# Patient Record
Sex: MALE | Race: AMERICAN INDIAN OR ALASKA NATIVE | URBAN - METROPOLITAN AREA
[De-identification: names, ages, dates, MRNs, and addresses within clinical notes are randomized per-mention and may not be internally consistent; named-entity substitution may affect disease eponyms.]

---

## 2017-10-14 ENCOUNTER — OUTPATIENT (OUTPATIENT)
Dept: OUTPATIENT SERVICES | Facility: HOSPITAL | Age: 67
LOS: 1 days | Discharge: HOME | End: 2017-10-14

## 2017-10-14 DIAGNOSIS — M10.9 GOUT, UNSPECIFIED: ICD-10-CM

## 2017-10-14 DIAGNOSIS — R07.9 CHEST PAIN, UNSPECIFIED: ICD-10-CM

## 2017-10-14 DIAGNOSIS — I10 ESSENTIAL (PRIMARY) HYPERTENSION: ICD-10-CM

## 2017-12-09 ENCOUNTER — OUTPATIENT (OUTPATIENT)
Dept: OUTPATIENT SERVICES | Facility: HOSPITAL | Age: 67
LOS: 1 days | Discharge: HOME | End: 2017-12-09

## 2017-12-09 DIAGNOSIS — I10 ESSENTIAL (PRIMARY) HYPERTENSION: ICD-10-CM

## 2017-12-09 DIAGNOSIS — R07.9 CHEST PAIN, UNSPECIFIED: ICD-10-CM

## 2017-12-09 DIAGNOSIS — M10.9 GOUT, UNSPECIFIED: ICD-10-CM

## 2018-04-04 ENCOUNTER — INPATIENT (INPATIENT)
Facility: HOSPITAL | Age: 68
LOS: 1 days | Discharge: HOME | End: 2018-04-06
Attending: INTERNAL MEDICINE | Admitting: INTERNAL MEDICINE

## 2018-04-04 VITALS
TEMPERATURE: 96 F | SYSTOLIC BLOOD PRESSURE: 157 MMHG | HEART RATE: 85 BPM | RESPIRATION RATE: 20 BRPM | WEIGHT: 195.11 LBS | DIASTOLIC BLOOD PRESSURE: 89 MMHG | OXYGEN SATURATION: 100 %

## 2018-04-04 DIAGNOSIS — Z98.890 OTHER SPECIFIED POSTPROCEDURAL STATES: Chronic | ICD-10-CM

## 2018-04-04 LAB
ALBUMIN SERPL ELPH-MCNC: 4.9 G/DL — SIGNIFICANT CHANGE UP (ref 3.5–5.2)
ALP SERPL-CCNC: 70 U/L — SIGNIFICANT CHANGE UP (ref 30–115)
ALT FLD-CCNC: 29 U/L — SIGNIFICANT CHANGE UP (ref 0–41)
ANION GAP SERPL CALC-SCNC: 14 MMOL/L — SIGNIFICANT CHANGE UP (ref 7–14)
APTT BLD: 32.2 SEC — SIGNIFICANT CHANGE UP (ref 27–39.2)
AST SERPL-CCNC: 20 U/L — SIGNIFICANT CHANGE UP (ref 0–41)
BILIRUB SERPL-MCNC: 0.5 MG/DL — SIGNIFICANT CHANGE UP (ref 0.2–1.2)
BUN SERPL-MCNC: 15 MG/DL — SIGNIFICANT CHANGE UP (ref 10–20)
CALCIUM SERPL-MCNC: 9.7 MG/DL — SIGNIFICANT CHANGE UP (ref 8.5–10.1)
CHLORIDE SERPL-SCNC: 103 MMOL/L — SIGNIFICANT CHANGE UP (ref 98–110)
CHOLEST SERPL-MCNC: 169 MG/DL — SIGNIFICANT CHANGE UP (ref 100–200)
CK MB CFR SERPL CALC: 6.6 NG/ML — HIGH (ref 0.6–6.3)
CK SERPL-CCNC: 312 U/L — HIGH (ref 0–225)
CK SERPL-CCNC: 408 U/L — HIGH (ref 0–225)
CO2 SERPL-SCNC: 24 MMOL/L — SIGNIFICANT CHANGE UP (ref 17–32)
CREAT SERPL-MCNC: 1 MG/DL — SIGNIFICANT CHANGE UP (ref 0.7–1.5)
ESTIMATED AVERAGE GLUCOSE: 128 MG/DL — HIGH (ref 68–114)
GLUCOSE SERPL-MCNC: 89 MG/DL — SIGNIFICANT CHANGE UP (ref 70–99)
HBA1C BLD-MCNC: 6.1 % — HIGH (ref 4–5.6)
HCT VFR BLD CALC: 48.2 % — SIGNIFICANT CHANGE UP (ref 42–52)
HDLC SERPL-MCNC: 43 MG/DL — SIGNIFICANT CHANGE UP (ref 40–125)
HGB BLD-MCNC: 16.5 G/DL — SIGNIFICANT CHANGE UP (ref 14–18)
INR BLD: 1.01 RATIO — SIGNIFICANT CHANGE UP (ref 0.65–1.3)
LACTATE SERPL-SCNC: 1.9 MMOL/L — SIGNIFICANT CHANGE UP (ref 0.5–2.2)
LIPID PNL WITH DIRECT LDL SERPL: 114 MG/DL — SIGNIFICANT CHANGE UP (ref 4–129)
MCHC RBC-ENTMCNC: 31 PG — SIGNIFICANT CHANGE UP (ref 27–31)
MCHC RBC-ENTMCNC: 34.2 G/DL — SIGNIFICANT CHANGE UP (ref 32–37)
MCV RBC AUTO: 90.6 FL — SIGNIFICANT CHANGE UP (ref 80–94)
NRBC # BLD: 0 /100 WBCS — SIGNIFICANT CHANGE UP (ref 0–0)
PLATELET # BLD AUTO: 170 K/UL — SIGNIFICANT CHANGE UP (ref 130–400)
POTASSIUM SERPL-MCNC: 4.1 MMOL/L — SIGNIFICANT CHANGE UP (ref 3.5–5)
POTASSIUM SERPL-SCNC: 4.1 MMOL/L — SIGNIFICANT CHANGE UP (ref 3.5–5)
PROT SERPL-MCNC: 7.6 G/DL — SIGNIFICANT CHANGE UP (ref 6–8)
PROTHROM AB SERPL-ACNC: 10.9 SEC — SIGNIFICANT CHANGE UP (ref 9.95–12.87)
RBC # BLD: 5.32 M/UL — SIGNIFICANT CHANGE UP (ref 4.7–6.1)
RBC # FLD: 12.1 % — SIGNIFICANT CHANGE UP (ref 11.5–14.5)
SODIUM SERPL-SCNC: 141 MMOL/L — SIGNIFICANT CHANGE UP (ref 135–146)
TOTAL CHOLESTEROL/HDL RATIO MEASUREMENT: 3.9 RATIO — LOW (ref 4–5.5)
TRIGL SERPL-MCNC: 125 MG/DL — SIGNIFICANT CHANGE UP (ref 10–149)
TROPONIN T SERPL-MCNC: <0.01 NG/ML — SIGNIFICANT CHANGE UP
TROPONIN T SERPL-MCNC: <0.01 NG/ML — SIGNIFICANT CHANGE UP
WBC # BLD: 6.67 K/UL — SIGNIFICANT CHANGE UP (ref 4.8–10.8)
WBC # FLD AUTO: 6.67 K/UL — SIGNIFICANT CHANGE UP (ref 4.8–10.8)

## 2018-04-04 RX ORDER — SODIUM CHLORIDE 9 MG/ML
1000 INJECTION INTRAMUSCULAR; INTRAVENOUS; SUBCUTANEOUS ONCE
Qty: 0 | Refills: 0 | Status: COMPLETED | OUTPATIENT
Start: 2018-04-04 | End: 2018-04-04

## 2018-04-04 RX ORDER — METOPROLOL TARTRATE 50 MG
25 TABLET ORAL
Qty: 0 | Refills: 0 | Status: DISCONTINUED | OUTPATIENT
Start: 2018-04-04 | End: 2018-04-06

## 2018-04-04 RX ORDER — ATORVASTATIN CALCIUM 80 MG/1
80 TABLET, FILM COATED ORAL AT BEDTIME
Qty: 0 | Refills: 0 | Status: DISCONTINUED | OUTPATIENT
Start: 2018-04-04 | End: 2018-04-06

## 2018-04-04 RX ORDER — ASPIRIN/CALCIUM CARB/MAGNESIUM 324 MG
81 TABLET ORAL DAILY
Qty: 0 | Refills: 0 | Status: DISCONTINUED | OUTPATIENT
Start: 2018-04-05 | End: 2018-04-06

## 2018-04-04 RX ORDER — ASPIRIN/CALCIUM CARB/MAGNESIUM 324 MG
325 TABLET ORAL ONCE
Qty: 0 | Refills: 0 | Status: COMPLETED | OUTPATIENT
Start: 2018-04-04 | End: 2018-04-04

## 2018-04-04 RX ORDER — ENOXAPARIN SODIUM 100 MG/ML
40 INJECTION SUBCUTANEOUS DAILY
Qty: 0 | Refills: 0 | Status: DISCONTINUED | OUTPATIENT
Start: 2018-04-04 | End: 2018-04-06

## 2018-04-04 RX ADMIN — ATORVASTATIN CALCIUM 80 MILLIGRAM(S): 80 TABLET, FILM COATED ORAL at 22:18

## 2018-04-04 RX ADMIN — Medication 325 MILLIGRAM(S): at 07:05

## 2018-04-04 RX ADMIN — SODIUM CHLORIDE 1000 MILLILITER(S): 9 INJECTION INTRAMUSCULAR; INTRAVENOUS; SUBCUTANEOUS at 08:24

## 2018-04-04 RX ADMIN — Medication 25 MILLIGRAM(S): at 18:22

## 2018-04-04 NOTE — ED PROCEDURE NOTE - ATTENDING CONTRIBUTION TO CARE
I was present for the key portions of the procedure and available as supervisor for the entire procedure as documented.

## 2018-04-04 NOTE — CONSULT NOTE ADULT - ASSESSMENT
66 yo male patient with HTN, DL, gout, ex-smoker, presented for recurrent episodes of dizziness , last episode this morning along with chest pain, nausea , and blurry vision.   EKG showed inverted T waves in lower leads, normal coronaries . CT head negative    - continue telemetry monitoring for arrhytmias  - Consider stress test  , TTE  - neurology evaluation 66 yo male patient with HTN, DL, gout, ex-smoker, presented for recurrent episodes of dizziness , last episode this morning along with chest pain, nausea , and blurry vision.   EKG showed NSR.     - continue telemetry monitoring for arrhytmias  - Consider repeat stress test  - neurology evaluation 66 yo male patient with HTN, DL, gout, ex-smoker, presented for recurrent episodes of dizziness , last episode this morning along with chest pain, nausea , and blurry vision.   EKG showed NSR.     - continue telemetry monitoring for arrhytmias  - Consider repeat stress test  - neurology evaluation    Attending: Patient was seen and examined. Discussed with the cardiologyteam.  I agree with the findings and plan as documented by the resident.    Doubt arrhythmia as underlying etiology.  F/u 2D echo and exercise nuclear stress test.  Telemetry x 24 hours.  Neurology work-up in progress.  C/w statin, BB, add ASA 81 mg.

## 2018-04-04 NOTE — H&P ADULT - HISTORY OF PRESENT ILLNESS
68 yo male patient, Known to have HTN, DL, gout, ex-smoker, presented for acute onset of dizziness that started while he was at work at 5am today.   The patient reports while he was sitting, he started to have acute onset of dizziness with sensation of blurriness as if he is gonna faint, associated with shortness of breath. nausea and mild chest pain. The chest pain was 2/10 in intensity and non radiating. It was not pressure like pain as per him. There was no plapitations. No cough. No URI symptoms.     The episode lasted for few seconds but was recurring for multiple times. When attempted to stand up and walk, he felt little unsteady and asked his coworker to take him to the ED. No weakness.    He reports chronic left shoulder pain of few weeks duration but his shoulder pain is not related to the episode that happened today per him.    He also reports that he is been having these episodes of dizziness that are not posiionnal  for the last few mo but this time it was more severe.    He denies any family history of heart disease. He quit smoking 30 years ago. He had a stress test done more than 10 years ago and was normal. He does not have a cardiologist and sees Dr Jenkins only who is his PCP. 68 yo male patient, Known to have HTN, DL, gout, ex-smoker, presented for acute onset of dizziness that started while he was at work at 5am today.   The patient reports while he was sitting, he started to have acute onset of dizziness with sensation of blurriness as if he is gonna faint, associated with shortness of breath. nausea and mild chest pain. The chest pain was 2/10 in intensity and non radiating. It was not pressure like pain as per him. There was no plapitations. No cough. No URI symptoms.     The episode lasted for few seconds but was recurring for multiple times. When attempted to stand up and walk, he felt little unsteady and asked his coworker to take him to the ED. No weakness.  He also reports that he is been having these episodes of dizziness that are not positional, for the last few mo but this time it was more severe.    He reports chronic left shoulder pain of few weeks duration but his shoulder pain is not related to the episode that happened today per him.    He denies any family history of heart disease. He quit smoking 30 years ago. He had a stress test done more than 10 years ago and was normal. He does not have a cardiologist and sees Dr Jenkins only who is his PCP. 66 yo male patient, Known to have HTN, DL, gout, ex-smoker, presented for acute onset of dizziness that started while he was at work at 5am today.   The patient reports while he was sitting, he started to have acute onset of dizziness with sensation of blurriness as if he is gonna faint, associated with shortness of breath. nausea and mild chest pain. The chest pain was 2/10 in intensity and non radiating. It was not pressure like pain as per him. There was no plapitations. No cough. No URI symptoms.     The episode lasted for few seconds but was recurring for multiple times. When attempted to stand up and walk, he felt little unsteady and asked his coworker to take him to the ED. No weakness.  He also reports that he is been having these episodes of dizziness that are not positional, for the last few mo but this time it was more severe.    He reports chronic left shoulder pain of few weeks duration but his shoulder pain is not related to the episode that happened today per him.    He reports that he exercises frequently but never felt any chest pain or SOB.  He denies any family history of heart disease. He quit smoking 30 years ago. He had a stress test done more than 10 years ago and was normal. He does not have a cardiologist and sees Dr Jenkins only who is his PCP. 66 yo male patient, Known to have HTN, DL, gout, ex-smoker, presented for acute onset of dizziness that started while he was at work at 5am today.   The patient reports while he was sitting, he started to have acute onset of dizziness with sensation of blurriness as if he is gonna faint, associated with shortness of breath. nausea and mild chest pain. The chest pain was 2/10 in intensity and non radiating. It was not pressure like pain as per him. There was no plapitations. No cough. No URI symptoms. normal po intake, No vomiting.    The episode lasted for few seconds but was recurring for multiple times. When attempted to stand up and walk, he felt little unsteady and asked his coworker to take him to the ED. No weakness.  He also reports that he is been having these episodes of dizziness that are not positional, for the last few mo but this time it was more severe. Most of the episodes happen in the morning    He reports chronic left shoulder pain of few weeks duration but his shoulder pain is not related to the episode that happened today per him.    He reports that he exercises frequently but never felt any chest pain or SOB.  He denies any family history of heart disease. He quit smoking 30 years ago. He had a stress test done more than 10 years ago and was normal. He does not have a cardiologist and sees Dr Jenkins only who is his PCP.

## 2018-04-04 NOTE — H&P ADULT - ATTENDING COMMENTS
68 yo male patient, Known to have HTN, DL, gout, ex-smoker, presented for acute onset of dizziness, associated with nausea, shortness of breath and mild chest pain, sensation as if he is gonna faint and unsteadiness. It lasted for few seconds but was recurrent and not positional.    1. Dizziness with SOB and chest pain, unsteadiness      ECG TWI in inferolateral leads     cardiac enzymes neg     Etiology?  TIA,   ACS  orthostatics (normal in ED) arrythmia  BPPV   hypoglycemia      CT head non contrast      Cardio/ neuro eval     c/w ASA 81, atorvastatin 80mg, metoprolol       follow up   cardiac enz, serial ECG, 2D echo     check lipid profile and HbA1c     check fasting sugars and AC + HS     DVT PPX, out of bed to chair, 2gSodium diet

## 2018-04-04 NOTE — ED PROVIDER NOTE - OBJECTIVE STATEMENT
66 yo M with PMHx of HTN, hypercholesterolemia and gout presents to the ED c/o chest pain and SOB. Pain is substernal and radiates to left arm. Pt states he has been having intermittent symptoms over the past few days and this morning around 530 symptoms returned and were worse than prior episodes. Pt denies hx of similar symptoms in the past. Pt denies hx of cardiac disease. Pt admits to hx of smoking but quit 10 years ago. Pt denies fever, chills, nausea, vomiting, abdominal pain, diarrhea, headache, dizziness, weakness, back pain, LOC, trauma, cough, calf pain/swelling, recent travel, recent surgery.

## 2018-04-04 NOTE — ED PROVIDER NOTE - ATTENDING CONTRIBUTION TO CARE
66 yo M wit history of HTN, HLD, former smoker here with intermittent L sided CP over the last 2-3 days, much worst at 5am today, described as pressure like with radiation down L arm. Associated with SOB -- VS unremarkable, on exam patient looks pale, not diaphoretic but not well, no murmur, clear lungs, no LE edema. EKG without ST elevations but does have T wave inversions inferolaterally with mild ST depressions laterally.     Given appearance of patient, EKG findings, description of sx, will check labs, give asa and likely admit to tele vs place in obs for further testing.

## 2018-04-04 NOTE — H&P ADULT - NSHPPHYSICALEXAM_GEN_ALL_CORE
T(C): 36.9 (04-04-18 @ 08:27), Max: 36.9 (04-04-18 @ 08:27)  HR: 65 (04-04-18 @ 08:27) (65 - 85)  BP: 121/74 (04-04-18 @ 08:27) (121/74 - 157/89)  RR: 18 (04-04-18 @ 08:27) (18 - 20)  SpO2: 100% (04-04-18 @ 05:57) (100% - 100%)    PHYSICAL EXAM:  GENERAL: NAD, well-developed  HEAD:  Atraumatic, Normocephalic  EYES: EOMI, PERRLA, conjunctiva and sclera clear  ENT: Normal tympanic membrane. No nasal obstruction or discharge. No tonsillar exudate, swelling or erythema.  NECK: Supple, No JVD  CHEST/LUNG: Clear to auscultation bilaterally; No wheeze  HEART: Regular rate and rhythm; No murmurs, rubs, or gallops  ABDOMEN: Soft, Nontender, Nondistended; Bowel sounds present  EXTREMITIES:  2+ Peripheral Pulses, No clubbing, cyanosis, or edema  PSYCH: AAOx3  NEUROLOGY: non-focal  SKIN: No rashes or lesions

## 2018-04-04 NOTE — CONSULT NOTE ADULT - SUBJECTIVE AND OBJECTIVE BOX
66 yo male patient with HTN, DL, gout, ex-smoker, presented for acute onset of dizziness that started while he was at work at 5am today, along with blurry vision, shortness of breath , nausea and chest pain 2/10 in intensity that lasted for few seconds. Patient presented to the ED for recurring symptoms.  He also reported unsteady gait. No palpitations, no persistent chest pain. He reported  chronic left shoulder pain of few weeks duration  not related to the episode     Patient is active at baseline,  he exercises frequently but never felt any chest pain or SOB.  No family history of heart disease. He quit smoking 30 years ago. He had a stress test done more than 10 years ago and was normal.     PAST MEDICAL & SURGICAL HISTORY:  Gout  High cholesterol  Hypertension  History of appendectomy      Home Medications:  metoprolol tartrate 50 mg oral tablet: orally once a day (04 Apr 2018 06:25)  simvastatin 20 mg oral tablet: 1 tab(s) orally once a day (at bedtime) (04 Apr 2018 06:25)    MEDICATIONS  (STANDING):  atorvastatin 80 milliGRAM(s) Oral at bedtime  enoxaparin Injectable 40 milliGRAM(s) SubCutaneous daily  metoprolol tartrate 25 milliGRAM(s) Oral two times a day    T(C): 36.9 (04-04-18 @ 08:27), Max: 36.9 (04-04-18 @ 08:27)  HR: 65 (04-04-18 @ 08:27) (65 - 85)  BP: 121/74 (04-04-18 @ 08:27) (121/74 - 157/89)  RR: 18 (04-04-18 @ 08:27) (18 - 20)  SpO2: 100% (04-04-18 @ 05:57) (100% - 100%)    PHYSICAL EXAM:  GENERAL: NAD, well-developed  HEAD:  Atraumatic, Normocephalic  EYES: EOMI, PERRLA, conjunctiva and sclera clear  ENT: Normal tympanic membrane. No nasal obstruction or discharge. No tonsillar exudate, swelling or erythema.  NECK: Supple, No JVD  CHEST/LUNG: Clear to auscultation bilaterally; No wheeze  HEART: Regular rate and rhythm; No murmurs, rubs, or gallops  ABDOMEN: Soft, Nontender, Nondistended; Bowel sounds present  EXTREMITIES:  2+ Peripheral Pulses, No clubbing, cyanosis, or edema  PSYCH: AAOx3  NEUROLOGY: non-focal  SKIN: No rashes or lesions                          16.5   6.67  )-----------( 170      ( 04 Apr 2018 06:33 )             48.2       04-04    141  |  103  |  15  ----------------------------<  89  4.1   |  24  |  1.0    Ca    9.7      04 Apr 2018 06:33    TPro  7.6  /  Alb  4.9  /  TBili  0.5  /  DBili  x   /  AST  20  /  ALT  29  /  AlkPhos  70  04-04    < from: 12 Lead ECG (04.04.18 @ 06:06) >    Diagnosis Line Normal sinus rhythm  T wave abnormality, consider inferolateral ischemia  Abnormal ECG    Confirmed by Felix Matthew ([id number]) on 4/4/2018 6:22:22 AM    < from: CT Head No Cont (04.04.18 @ 10:59) >  Impression:     1.  No acute mass effect, midline shift or hemorrhage.    2.  If the patient continues to be symptomatic follow-up CT scan and/or   MRI of the brain may be helpful for further evaluation.     < from: Xray Chest 1 View-PORTABLE IMMEDIATE (04.04.18 @ 06:56) >  Impression:      No radiographic evidence of acute cardiopulmonary disease. 66 yo male patient with HTN, DL, gout, ex-smoker, presented for acute onset of dizziness that started while he was at work at 5am today, along with blurry vision, shortness of breath , nausea and chest pain 2/10 in intensity that lasted for few seconds. Patient presented to the ED for recurring symptoms.  He also reported unsteady gait. No palpitations, no persistent chest pain. He reported  chronic left shoulder pain of few weeks duration  not related to the episode . Patient was hospitalized in 2014 for SOB , TTE and stress test were normal.    Patient is active at baseline,  he exercises frequently but never felt any chest pain or SOB.  No family history of heart disease. He quit smoking 30 years ago.     PAST MEDICAL & SURGICAL HISTORY:  Gout  High cholesterol  Hypertension  History of appendectomy      Home Medications:  metoprolol tartrate 50 mg oral tablet: orally once a day (04 Apr 2018 06:25)  simvastatin 20 mg oral tablet: 1 tab(s) orally once a day (at bedtime) (04 Apr 2018 06:25)    MEDICATIONS  (STANDING):  atorvastatin 80 milliGRAM(s) Oral at bedtime  enoxaparin Injectable 40 milliGRAM(s) SubCutaneous daily  metoprolol tartrate 25 milliGRAM(s) Oral two times a day    T(C): 36.9 (04-04-18 @ 08:27), Max: 36.9 (04-04-18 @ 08:27)  HR: 65 (04-04-18 @ 08:27) (65 - 85)  BP: 121/74 (04-04-18 @ 08:27) (121/74 - 157/89)  RR: 18 (04-04-18 @ 08:27) (18 - 20)  SpO2: 100% (04-04-18 @ 05:57) (100% - 100%)    PHYSICAL EXAM:  GENERAL: NAD, well-developed  HEAD:  Atraumatic, Normocephalic  CHEST/LUNG: Clear to auscultation bilaterally; No wheeze  HEART: Regular rate and rhythm; No murmurs, rubs, or gallops  ABDOMEN: Soft, Nontender, Nondistended; Bowel sounds present  EXTREMITIES:  2+ Peripheral Pulses, No clubbing, cyanosis, or edema                        16.5   6.67  )-----------( 170      ( 04 Apr 2018 06:33 )             48.2       04-04    141  |  103  |  15  ----------------------------<  89  4.1   |  24  |  1.0    Ca    9.7      04 Apr 2018 06:33    TPro  7.6  /  Alb  4.9  /  TBili  0.5  /  DBili  x   /  AST  20  /  ALT  29  /  AlkPhos  70  04-04    < from: 12 Lead ECG (04.04.18 @ 06:06) >    Diagnosis Line Normal sinus rhythm  T wave abnormality, consider inferolateral ischemia  Abnormal ECG    Confirmed by Felix Matthew ([id number]) on 4/4/2018 6:22:22 AM    < from: CT Head No Cont (04.04.18 @ 10:59) >  Impression:     1.  No acute mass effect, midline shift or hemorrhage.    2.  If the patient continues to be symptomatic follow-up CT scan and/or   MRI of the brain may be helpful for further evaluation.     < from: Xray Chest 1 View-PORTABLE IMMEDIATE (04.04.18 @ 06:56) >  Impression:      No radiographic evidence of acute cardiopulmonary disease.      TTE 2014: Left ventricle: Systolic function was normal. Ejection fraction was estimated in the range of 55 % to 65 %. There were no regional wall motion abnormalities. Wall thickness was mildly increased. Hypertrophy was noted. Mitral valve: There was mild regurgitation. Tricuspid valve: There was mild regurgitation.     Exercise stress test 2014 : normal

## 2018-04-04 NOTE — CONSULT NOTE ADULT - SUBJECTIVE AND OBJECTIVE BOX
RODRIGUEZ KAUFFMAN     67y     Male    [id number]                                                           CC:Patient is a 67y old  Male who presents with a chief complaint of dizziness (04 Apr 2018 09:28)      HPI:  68 yo male patient, Known to have HTN, DL, gout, ex-smoker, presented for acute onset of dizziness that started while he was at work at 5am today.   The patient reports while he was sitting, he started to have acute onset of dizziness with sensation of blurriness as if he is gonna faint, associated with shortness of breath. nausea and mild chest pain. The chest pain was 2/10 in intensity and non radiating. It was not pressure like pain as per him. There was no plapitations. No cough. No URI symptoms. normal po intake, No vomiting.    The episode lasted for few seconds but was recurring for multiple times. When attempted to stand up and walk, he felt little unsteady and asked his coworker to take him to the ED. No weakness.  He also reports that he is been having these episodes of dizziness that are not positional, for the last few mo but this time it was more severe. Most of the episodes happen in the morning    He reports chronic left shoulder pain of few weeks duration but his shoulder pain is not related to the episode that happened today per him.    He reports that he exercises frequently but never felt any chest pain or SOB.  He denies any family history of heart disease. He quit smoking 30 years ago. He had a stress test done more than 10 years ago and was normal. He does not have a cardiologist and sees Dr Jenkins only who is his PCP. (04 Apr 2018 09:28)    Patient seen and examined.  Patient had dizziness this morning when going from car to work.  Felt sweaty, palpitations and left chest tightness.  Dizziness still occurring when he tries to move.  Orthostatics checked by me at bedside negative    ROS:  Constitutional, Neurological, Psychiatric, Eyes, ENT, Cardiovascular, Respiratory, Gastrointestinal, Genitourinary, Musculoskeletal, Integumentary, Endocrine and Heme/Lymph are otherwise negative. Except for    FAMILY HISTORY:  Family history of breast cancer (Mother)  Family history of pancreatic cancer (Father): father      Vital Signs Last 24 Hrs  T(C): 36.9 (04 Apr 2018 08:27), Max: 36.9 (04 Apr 2018 08:27)  T(F): 98.4 (04 Apr 2018 08:27), Max: 98.4 (04 Apr 2018 08:27)  HR: 65 (04 Apr 2018 08:27) (65 - 85)  BP: 121/74 (04 Apr 2018 08:27) (121/74 - 157/89)  BP(mean): --  RR: 18 (04 Apr 2018 08:27) (18 - 20)  SpO2: 100% (04 Apr 2018 05:57) (100% - 100%)    Physical Exam:  Constitutional: alert and in no acute distress.  Eyes: the sclera and conjunctiva were normal, pupils were equal in size, round, reactive to light, with normal accommodation and extraocular movements were intact.   Back: no costovertebral angle tenderness and no spinal tenderness.      Neuro Exam:  Orientation: oriented to person, oriented to place and oriented to time.   Attention: normal concentrating ability and visual attention was not decreased.   Language: no difficulty naming common objects, no difficulty repeating a phrase, no difficulty writing a sentence, fluency intact, comprehension intact and reading intact.   Fund of knowledge: displays adequate knowledge of personal past history.   Cranial Nerves: visual acuity intact bilaterally, visual fields full to confrontation, pupils equal round and reactive to light, extraocular motion intact, facial sensation intact symmetrically, face symmetrical, hearing was intact bilaterally, tongue and palate midline, head turning and shoulder shrug symmetric and there was no tongue deviation with protrusion.   Motor: muscle tone was normal in all four extremities, muscle strength was normal in all four extremities and normal bulk in all four extremities.   Sensory exam: light touch was intact.   Coordination:. normal gait. balance was intact. there was no past-pointing. no tremor present.   Deep tendon reflexes:   1+ in UE and 0 in LE  Plantar responses normal on the right, normal on the left.      NIHSS: 0    Allergies    No Known Allergies    Intolerances       Home Medications:  metoprolol tartrate 50 mg oral tablet: orally once a day (04 Apr 2018 06:25)  simvastatin 20 mg oral tablet: 1 tab(s) orally once a day (at bedtime) (04 Apr 2018 06:25)      MEDICATIONS  (STANDING):  atorvastatin 80 milliGRAM(s) Oral at bedtime  enoxaparin Injectable 40 milliGRAM(s) SubCutaneous daily  metoprolol tartrate 25 milliGRAM(s) Oral two times a day    MEDICATIONS  (PRN):      LABS:                        16.5   6.67  )-----------( 170      ( 04 Apr 2018 06:33 )             48.2     04-04    141  |  103  |  15  ----------------------------<  89  4.1   |  24  |  1.0    Ca    9.7      04 Apr 2018 06:33    TPro  7.6  /  Alb  4.9  /  TBili  0.5  /  DBili  x   /  AST  20  /  ALT  29  /  AlkPhos  70  04-04    PT/INR - ( 04 Apr 2018 06:33 )   PT: 10.90 sec;   INR: 1.01 ratio         PTT - ( 04 Apr 2018 06:33 )  PTT:32.2 sec        Neuro Imaging:  < from: CT Head No Cont (04.04.18 @ 10:59) >  INTERPRETATION:  Clinical History / Reason for exam: Dizziness rule out   stroke.    Technique: Multiple contiguous axial CT images of the head were obtained   from the base of the skull to the vertex without administration of   intravenous contrast.    Comparison: None available at this time.      Findings: The ventricles, basal cisterns and sulcal pattern are within   normal limits for patient's stated age.  There are no cerebral,   cerebellar or mid brain parenchymal abnormalities.  There is no acute   mass effect, midline shift or hemorrhage.  No extra-axial fluid   collections are identified.    The bones of the calvarium are intact.  The paranasal sinuses, bilateral   mastoid complexes, globes and orbits are grossly within normal limits.    Beam hardening artifact is noted overlying the brain stem and posterior   fossa which is inherent to CT in this location.    Impression:     1.  No acute mass effect, midline shift or hemorrhage.    2.  If the patient continues to be symptomatic follow-up CT scan and/or   MRI of the brain may be helpful for further evaluation.         < end of copied text >      Assessment / Plan: Patient presenting with dizziness and chest tightness.  DDx VBI, Cardiac event  1. CT angio of head and neck - if stenosis of posterior circulation then obtain MRI brain w/o KINGSLEY  2. Cardiac work up  3. Repeat orthostatics  4. Call with questions

## 2018-04-04 NOTE — H&P ADULT - FAMILY HISTORY
Father  Still living? Unknown  Family history of pancreatic cancer, Age at diagnosis: Age Unknown     Mother  Still living? Unknown  Family history of breast cancer, Age at diagnosis: Age Unknown

## 2018-04-04 NOTE — ED PROVIDER NOTE - MEDICAL DECISION MAKING DETAILS
CK and CKMB elevated, 1st trop negative -- will proceed with admission for serial trops, concern for evolving cardiac ischemia

## 2018-04-04 NOTE — H&P ADULT - ASSESSMENT
68 yo male patient, Known to have HTN, DL, gout, ex-smoker, presented for acute onset of dizziness, associated with nausea, shortness of breath and mild chest pain, sensation as if he is gonna faint and unsteadiness. It lasted for few seconds but was recurrent and not positional.    vitals stable. 68 yo male patient, Known to have HTN, DL, gout, ex-smoker, presented for acute onset of dizziness, associated with nausea, shortness of breath and mild chest pain, sensation as if he is gonna faint and unsteadiness. It lasted for few seconds but was recurrent and not positional.    vitals stable. CXR NAPD  ECG: TWI in inferolateral leads. No prior ecg to compare. cardiac enzymes negative  orthostatics taken by me in the ED were negative.    #Dizziness with SOB and chest pain, unsteadiness  ECG changes with neg cardiac enzymes     r/o CVA, r/o ACS   r/o orthostatic hypotension   r/o arrythmia   r/o BPPV  r/o hypoglycemia     -admit to Telemetry  -CT head non contrast, neurology consult.   - recheck orthostatics  - ASA 81, atorvastatin 80mg, metoprolol  -follow up 2nd set of cardiac enz, serial ECG, 2D echo  -cardiology Evaluation  -check lipid profile and HbA1c  -check fasting sugars and AC + HS  -DVT PPX, out of bed to chair, 2gSodium diet 68 yo male patient, Known to have HTN, DL, gout, ex-smoker, presented for acute onset of dizziness, associated with nausea, shortness of breath and mild chest pain, sensation as if he is gonna faint and unsteadiness. It lasted for few seconds but was recurrent and not positional.    vitals stable. CXR NAPD  ECG: TWI in inferolateral leads. No prior ecg to compare. cardiac enzymes negative  orthostatics taken by me in the ED were negative.    #Dizziness with SOB and chest pain, unsteadiness  ECG changes with neg cardiac enzymes     r/o TIA, r/o ACS   r/o orthostatic hypotension   r/o arrythmia   r/o BPPV  r/o hypoglycemia     -admit to Telemetry  -CT head non contrast, neurology consult.   - recheck orthostatics  - ASA 81, atorvastatin 80mg, metoprolol  -follow up 2nd set of cardiac enz, serial ECG, 2D echo  -cardiology Evaluation  -check lipid profile and HbA1c  -check fasting sugars and AC + HS  -DVT PPX, out of bed to chair, 2gSodium diet

## 2018-04-04 NOTE — H&P ADULT - NSHPLABSRESULTS_GEN_ALL_CORE
16.5   6.67  )-----------( 170      ( 04 Apr 2018 06:33 )             48.2       04-04    141  |  103  |  15  ----------------------------<  89  4.1   |  24  |  1.0    Ca    9.7      04 Apr 2018 06:33    TPro  7.6  /  Alb  4.9  /  TBili  0.5  /  DBili  x   /  AST  20  /  ALT  29  /  AlkPhos  70  04-04          PT/INR - ( 04 Apr 2018 06:33 )   PT: 10.90 sec;   INR: 1.01 ratio         PTT - ( 04 Apr 2018 06:33 )  PTT:32.2 sec    Lactate Trend  04-04 @ 06:33 Lactate:1.9       CARDIAC MARKERS ( 04 Apr 2018 06:33 )  x     / <0.01 ng/mL / 408 U/L / x     / 6.6 ng/mL

## 2018-04-04 NOTE — ED PROVIDER NOTE - PHYSICAL EXAMINATION
VITAL SIGNS: I have reviewed nursing notes and confirm.  CONSTITUTIONAL: Well-developed; well-nourished; in no acute distress.  SKIN: Skin exam is warm and dry, no acute rash.  HEAD: Normocephalic; atraumatic.  EYES: PERRL, EOM intact; conjunctiva and sclera clear.  ENT: No nasal discharge; airway clear.   NECK: Supple; non tender.  CARD: S1, S2 normal; no murmurs, gallops, or rubs. Regular rate and rhythm. No chest wall TTP.  RESP: No wheezes, rales or rhonchi. Speaking in full sentences.   ABD: Normal bowel sounds; soft; non-distended; non-tender; No rebound or guarding.   EXT: Normal ROM. No clubbing, cyanosis or edema.  NEURO: Alert, oriented. Grossly unremarkable. No focal deficits.

## 2018-04-04 NOTE — ED PROVIDER NOTE - PROGRESS NOTE DETAILS
Discussed case with Dr. Grayson covering for Dr. Jenkins, accepts admission. Signed out case to Dr. Debbie LAKE.

## 2018-04-04 NOTE — H&P ADULT - NSHPSOCIALHISTORY_GEN_ALL_CORE
stopped smoking 30 years ago, non alcoholic, no illicit drug use  Has a desk Job , works in nutrition

## 2018-04-04 NOTE — H&P ADULT - NSHPREVIEWOFSYSTEMS_GEN_ALL_CORE
REVIEW OF SYSTEMS:    CONSTITUTIONAL: No weakness, fevers or chills  EYES/ENT: No visual changes;  No vertigo or throat pain   NECK: No pain or stiffness  RESPIRATORY: No cough, wheezing, hemoptysis; No shortness of breath  CARDIOVASCULAR: No chest pain or palpitations  GASTROINTESTINAL: No abdominal or epigastric pain. No nausea, vomiting, or hematemesis; No diarrhea or constipation. No melena or hematochezia.  GENITOURINARY: No dysuria, frequency or hematuria  NEUROLOGICAL: No numbness or weakness  SKIN: No itching, rashes REVIEW OF SYSTEMS:    CONSTITUTIONAL: No weakness, fevers or chills  EYES/ENT: No visual changes;  No vertigo or throat pain   NECK: No pain or stiffness  RESPIRATORY: No cough, wheezing, hemoptysis; No shortness of breath  CARDIOVASCULAR: No palpitations  GASTROINTESTINAL: No abdominal or epigastric pain. No  vomiting, or hematemesis; No diarrhea or constipation  GENITOURINARY: No dysuria, frequency or hematuria  NEUROLOGICAL: No numbness or weakness  SKIN: No itching, rashes

## 2018-04-05 LAB
ANION GAP SERPL CALC-SCNC: 7 MMOL/L — SIGNIFICANT CHANGE UP (ref 7–14)
BUN SERPL-MCNC: 16 MG/DL — SIGNIFICANT CHANGE UP (ref 10–20)
CALCIUM SERPL-MCNC: 9.4 MG/DL — SIGNIFICANT CHANGE UP (ref 8.5–10.1)
CHLORIDE SERPL-SCNC: 105 MMOL/L — SIGNIFICANT CHANGE UP (ref 98–110)
CK SERPL-CCNC: 241 U/L — HIGH (ref 0–225)
CO2 SERPL-SCNC: 26 MMOL/L — SIGNIFICANT CHANGE UP (ref 17–32)
CREAT SERPL-MCNC: 1.1 MG/DL — SIGNIFICANT CHANGE UP (ref 0.7–1.5)
GLUCOSE SERPL-MCNC: 104 MG/DL — HIGH (ref 70–99)
POTASSIUM SERPL-MCNC: 4.6 MMOL/L — SIGNIFICANT CHANGE UP (ref 3.5–5)
POTASSIUM SERPL-SCNC: 4.6 MMOL/L — SIGNIFICANT CHANGE UP (ref 3.5–5)
SODIUM SERPL-SCNC: 138 MMOL/L — SIGNIFICANT CHANGE UP (ref 135–146)
TROPONIN T SERPL-MCNC: <0.01 NG/ML — SIGNIFICANT CHANGE UP

## 2018-04-05 RX ADMIN — Medication 25 MILLIGRAM(S): at 05:42

## 2018-04-05 RX ADMIN — ENOXAPARIN SODIUM 40 MILLIGRAM(S): 100 INJECTION SUBCUTANEOUS at 11:57

## 2018-04-05 RX ADMIN — ATORVASTATIN CALCIUM 80 MILLIGRAM(S): 80 TABLET, FILM COATED ORAL at 21:55

## 2018-04-05 RX ADMIN — Medication 81 MILLIGRAM(S): at 11:57

## 2018-04-05 NOTE — PROGRESS NOTE ADULT - ASSESSMENT
66 yo male patient, Known to have HTN, DL, gout, ex-smoker, presented for acute onset of dizziness, associated with nausea, shortness of breath and mild chest pain, sensation as if he is gonna faint and unsteadiness. It lasted for few seconds but was recurrent and not positional.    #Dizziness: r/o TIA vs arrythmia vs hypoglycemia  orthostatics negative  2 sets of ce negative  stress test: pending  CT head: no acute changes  neuro eval appreciated: CT angio of head and neck - if stenosis of posterior circulation will obtain MRI brain w/o KINGSLEY  echo findings:  1. Normal global left ventricular systolic function.   2. LV Ejection Fraction by Mckeon's Method with a biplane EF of 69 %.   3. Spectral Doppler shows impaired relaxation pattern of left   ventricular myocardial filling (Grade I diastolic dysfunction).    C/w statin, BB, add ASA 81 mg.    #DVT PPX, out of bed to chair, 2gSodium diet

## 2018-04-05 NOTE — PROGRESS NOTE ADULT - ATTENDING COMMENTS
66 yo male patient, Known to have HTN, DL, gout, ex-smoker, presented for acute onset of dizziness, associated with nausea, shortness of breath and mild chest pain, sensation as if he is gonna faint and unsteadiness. It lasted for few seconds but was recurrent and not positional.    1. Dizziness with SOB and chest pain, unsteadiness      ECG TWI in inferolateral leads     cardiac enzymes neg     Etiology?  TIA,   ACS  orthostatics (normal in ED) arrythmia  BPPV   hypoglycemia      CT head non contrast      Cardio/ neuro evals appreciated     c/w ASA 81, atorvastatin 80mg, metoprolol       follow up   cardiac enz, serial ECG, 2D echo CTA     check lipid profile and HbA1c     check fasting sugars and AC + HS     DVT PPX, out of bed to chair, 2gSodium diet .

## 2018-04-05 NOTE — PROGRESS NOTE ADULT - SUBJECTIVE AND OBJECTIVE BOX
DALY RODRIGUEZ  67y  Male    Patient is a 67y old  Male who presents with a chief complaint of dizziness (04 Apr 2018 09:28)      INTERVAL HPI/OVERNIGHT EVENTS: none    T(C): 35.8 (04-04-18 @ 20:19), Max: 36.9 (04-04-18 @ 08:27)  HR: 64 (04-04-18 @ 20:19) (64 - 85)  BP: 133/78 (04-04-18 @ 20:19) (121/74 - 183/97)  RR: 18 (04-04-18 @ 20:19) (16 - 20)  SpO2: 98% (04-04-18 @ 20:30) (97% - 100%)  Wt(kg): --Vital Signs Last 24 Hrs  T(C): 35.8 (04 Apr 2018 20:19), Max: 36.9 (04 Apr 2018 08:27)  T(F): 96.5 (04 Apr 2018 20:19), Max: 98.4 (04 Apr 2018 08:27)  HR: 64 (04 Apr 2018 20:19) (64 - 85)  BP: 133/78 (04 Apr 2018 20:19) (121/74 - 183/97)  BP(mean): --  RR: 18 (04 Apr 2018 20:19) (16 - 20)  SpO2: 98% (04 Apr 2018 20:30) (97% - 100%)    PHYSICAL EXAM:  GENERAL: NAD, well-groomed, well-developed  HEAD:  Atraumatic, Normocephalic  NECK: Supple, No JVD, Normal thyroid  NERVOUS SYSTEM:  Alert & Oriented X3, Good concentration; Motor Strength 5/5 B/L upper and lower extremities; DTRs 2+ intact and symmetric  CHEST/LUNG: Clear to percussion bilaterally; No rales, rhonchi, wheezing, or rubs  HEART: Regular rate and rhythm; No murmurs, rubs, or gallops  ABDOMEN: Soft, Nontender, Nondistended; Bowel sounds present  EXTREMITIES:  2+ Peripheral Pulses, No clubbing, cyanosis, or edema    Consultant(s) Notes Reviewed:  [x ] YES  [ ] NO    Discussed with Consultants/Other Providers/Residents [ x] YES     LABS                         16.5   6.67  )-----------( 170      ( 04 Apr 2018 06:33 )             48.2     04-04    141  |  103  |  15  ----------------------------<  89  4.1   |  24  |  1.0    Ca    9.7      04 Apr 2018 06:33    TPro  7.6  /  Alb  4.9  /  TBili  0.5  /  DBili  x   /  AST  20  /  ALT  29  /  AlkPhos  70  04-04    PT/INR - ( 04 Apr 2018 06:33 )   PT: 10.90 sec;   INR: 1.01 ratio         PTT - ( 04 Apr 2018 06:33 )  PTT:32.2 sec      LIVER FUNCTIONS - ( 04 Apr 2018 06:33 )  Alb: 4.9 g/dL / Pro: 7.6 g/dL / ALK PHOS: 70 U/L / ALT: 29 U/L / AST: 20 U/L / GGT: x           CAPILLARY BLOOD GLUCOSE            RADIOLOGY & ADDITIONAL TESTS:    Imaging Personally Reviewed:  [x ] YES  [ ] NO    MEDICATIONS  (STANDING):  aspirin  chewable 81 milliGRAM(s) Oral daily  atorvastatin 80 milliGRAM(s) Oral at bedtime  enoxaparin Injectable 40 milliGRAM(s) SubCutaneous daily  metoprolol tartrate 25 milliGRAM(s) Oral two times a day    MEDICATIONS  (PRN):      HEALTH ISSUES - PROBLEM Dx:

## 2018-04-05 NOTE — PROGRESS NOTE ADULT - SUBJECTIVE AND OBJECTIVE BOX
SUBJECTIVE:    Patient is a 67y old Male who presents with a chief complaint of dizziness    Currently admitted to medicine with the primary diagnosis of Chest pain     Today is hospital day 1d. This morning he is resting comfortably in bed and reports no new issues or overnight events.     PAST MEDICAL & SURGICAL HISTORY  Gout  High cholesterol  Hypertension  History of appendectomy    SOCIAL HISTORY:  Negative for smoking/alcohol/drug use.     ALLERGIES:  No Known Allergies    MEDICATIONS:  STANDING MEDICATIONS  aspirin  chewable 81 milliGRAM(s) Oral daily  atorvastatin 80 milliGRAM(s) Oral at bedtime  enoxaparin Injectable 40 milliGRAM(s) SubCutaneous daily  metoprolol tartrate 25 milliGRAM(s) Oral two times a day    PRN MEDICATIONS    VITALS:   T(F): 97.1  HR: 62  BP: 121/74  RR: 18  SpO2: 98%    LABS:                        16.5   6.67  )-----------( 170      ( 04 Apr 2018 06:33 )             48.2     04-04    141  |  103  |  15  ----------------------------<  89  4.1   |  24  |  1.0    Ca    9.7      04 Apr 2018 06:33    TPro  7.6  /  Alb  4.9  /  TBili  0.5  /  DBili  x   /  AST  20  /  ALT  29  /  AlkPhos  70  04-04    PT/INR - ( 04 Apr 2018 06:33 )   PT: 10.90 sec;   INR: 1.01 ratio         PTT - ( 04 Apr 2018 06:33 )  PTT:32.2 sec      Creatine Kinase, Serum: 241 U/L <H> (04-05-18 @ 07:49)  Troponin T, Serum: <0.01 ng/mL (04-05-18 @ 07:49)  Creatine Kinase, Serum: 312 U/L <H> (04-04-18 @ 12:03)  Troponin T, Serum: <0.01 ng/mL (04-04-18 @ 12:03)      CARDIAC MARKERS ( 05 Apr 2018 07:49 )  x     / <0.01 ng/mL / 241 U/L / x     / x      CARDIAC MARKERS ( 04 Apr 2018 12:03 )  x     / <0.01 ng/mL / 312 U/L / x     / x      CARDIAC MARKERS ( 04 Apr 2018 06:33 )  x     / <0.01 ng/mL / 408 U/L / x     / 6.6 ng/mL      RADIOLOGY:    PHYSICAL EXAM:  GEN: No acute distress  LUNGS: Clear to auscultation bilaterally   HEART: S1/S2 present. RRR.   ABD: Soft, non-tender, non-distended. Bowel sounds present  EXT: NC/NC/NE/2+PP/DAVIS  NEURO: AAOX3

## 2018-04-06 ENCOUNTER — TRANSCRIPTION ENCOUNTER (OUTPATIENT)
Age: 68
End: 2018-04-06

## 2018-04-06 VITALS
TEMPERATURE: 98 F | RESPIRATION RATE: 19 BRPM | SYSTOLIC BLOOD PRESSURE: 196 MMHG | HEIGHT: 60 IN | HEART RATE: 70 BPM | DIASTOLIC BLOOD PRESSURE: 80 MMHG | WEIGHT: 103.18 LBS

## 2018-04-06 LAB
ANION GAP SERPL CALC-SCNC: 12 MMOL/L — SIGNIFICANT CHANGE UP (ref 7–14)
BUN SERPL-MCNC: 19 MG/DL — SIGNIFICANT CHANGE UP (ref 10–20)
CALCIUM SERPL-MCNC: 9.1 MG/DL — SIGNIFICANT CHANGE UP (ref 8.5–10.1)
CHLORIDE SERPL-SCNC: 106 MMOL/L — SIGNIFICANT CHANGE UP (ref 98–110)
CO2 SERPL-SCNC: 24 MMOL/L — SIGNIFICANT CHANGE UP (ref 17–32)
CREAT SERPL-MCNC: 1.1 MG/DL — SIGNIFICANT CHANGE UP (ref 0.7–1.5)
GLUCOSE SERPL-MCNC: 122 MG/DL — HIGH (ref 70–99)
POTASSIUM SERPL-MCNC: 4.3 MMOL/L — SIGNIFICANT CHANGE UP (ref 3.5–5)
POTASSIUM SERPL-SCNC: 4.3 MMOL/L — SIGNIFICANT CHANGE UP (ref 3.5–5)
SODIUM SERPL-SCNC: 142 MMOL/L — SIGNIFICANT CHANGE UP (ref 135–146)

## 2018-04-06 RX ADMIN — Medication 81 MILLIGRAM(S): at 12:42

## 2018-04-06 RX ADMIN — Medication 25 MILLIGRAM(S): at 05:24

## 2018-04-06 NOTE — PROGRESS NOTE ADULT - ASSESSMENT
No further chest discomfort.  Telemetry negative for arrhythmia.  Negative stress test for ischemia.  Normal LV function on echo.  CTA noted.    Recommend:  Primary prevention measures.  D/c tele.  Lipid and BP control. Low dose ASA if no contraindications.  Neurology follow-up.  May d/c home form cardiac perspective.

## 2018-04-06 NOTE — PROGRESS NOTE ADULT - SUBJECTIVE AND OBJECTIVE BOX
Patient is a 67y old  Male who presents with a chief complaint of dizziness (04 Apr 2018 09:28)    HPI:  68 yo male patient, Known to have HTN, DL, gout, ex-smoker, presented for acute onset of dizziness that started while he was at work at 5am today.   The patient reports while he was sitting, he started to have acute onset of dizziness with sensation of blurriness as if he is gonna faint, associated with shortness of breath. nausea and mild chest pain. The chest pain was 2/10 in intensity and non radiating. It was not pressure like pain as per him. There was no plapitations. No cough. No URI symptoms. normal po intake, No vomiting.    The episode lasted for few seconds but was recurring for multiple times. When attempted to stand up and walk, he felt little unsteady and asked his coworker to take him to the ED. No weakness.  He also reports that he is been having these episodes of dizziness that are not positional, for the last few mo but this time it was more severe. Most of the episodes happen in the morning    He reports chronic left shoulder pain of few weeks duration but his shoulder pain is not related to the episode that happened today per him.    He reports that he exercises frequently but never felt any chest pain or SOB.  He denies any family history of heart disease. He quit smoking 30 years ago. He had a stress test done more than 10 years ago and was normal. He does not have a cardiologist and sees Dr Jenkins only who is his PCP. (04 Apr 2018 09:28)      SUBJ:  Patient seen and examined. No  chest pain.      MEDICATIONS  (STANDING):  aspirin  chewable 81 milliGRAM(s) Oral daily  atorvastatin 80 milliGRAM(s) Oral at bedtime  enoxaparin Injectable 40 milliGRAM(s) SubCutaneous daily  metoprolol tartrate 25 milliGRAM(s) Oral two times a day    MEDICATIONS  (PRN):            Vital Signs Last 24 Hrs  T(C): 35.7 (06 Apr 2018 05:27), Max: 36.4 (05 Apr 2018 13:46)  T(F): 96.2 (06 Apr 2018 05:27), Max: 97.6 (05 Apr 2018 13:46)  HR: 61 (06 Apr 2018 05:27) (61 - 70)  BP: 141/82 (05 Apr 2018 20:13) (109/72 - 141/82)  BP(mean): --  RR: 8 (05 Apr 2018 20:13) (8 - 18)  SpO2: 94% (05 Apr 2018 20:12) (94% - 98%)      PHYSICAL EXAM:    GEN: AAO x 3, NAD  HEENT: NC/AT, PERRL  Neck: No JVD, no bruits  CV: Reg, S1-S2, no murmur  Lungs: CTAB  Abd: Soft, non-tender  Ext: No edema      I&O's Summary  	    TELEMETRY:    ECG:  < from: 12 Lead ECG (04.04.18 @ 06:06) >  Normal sinus rhythm  T wave abnormality, consider inferolateral ischemia  Abnormal ECG      < end of copied text >      TTE:  < from: Transthoracic Echocardiogram (04.04.18 @ 13:44) >  Summary:   1. Normal global left ventricular systolic function.   2. LV Ejection Fraction by Mckeon's Method with a biplane EF of 69 %.   3. Spectral Doppler shows impaired relaxation pattern of left   ventricular myocardial filling (Grade I diastolic dysfunction).    PHYSICIAN INTERPRETATION:  Left Ventricle: The left ventricular internal cavity size is normal. Left   ventricular wall thickness is normal. Global LV systolic function was   normal. Spectral Doppler shows impaired relaxation pattern of left   ventricular myocardial filling(Grade I diastolic dysfunction). LV   Ejection Fraction by Mckeon's Method with a biplane EF of 69 %.  Right Ventricle: Normal right ventricular size and function.  Left Atrium: Normal left atrial size.  Right Atrium: Normal right atrial size.  Pericardium: There is no evidence of pericardial effusion.  Mitral Valve: Trace mitral valve regurgitation is seen.  Tricuspid Valve: Mild tricuspid regurgitation is visualized.  Aortic Valve: No evidence of aortic stenosis. Trivial aortic valve   regurgitation is seen.  Pulmonic Valve: Mild pulmonic valve regurgitation.  Aorta: The aortic root is normal in size and structure.  Pulmonary Artery: Normal pulmonary artery pressure.    < end of copied text >      LABS:    04-05    138  |  105  |  16  ----------------------------<  104<H>  4.6   |  26  |  1.1    Ca    9.4      05 Apr 2018 07:49      CARDIAC MARKERS ( 05 Apr 2018 07:49 )  x     / <0.01 ng/mL / 241 U/L / x     / x      CARDIAC MARKERS ( 04 Apr 2018 12:03 )  x     / <0.01 ng/mL / 312 U/L / x     / x                BNP  RADIOLOGY & ADDITIONAL STUDIES:  STRESS:    < from: NM Nuclear Stress Multiple (04.05.18 @ 17:57) >  Impression:   1. NORMALSTRESS AND REST MYOCARDIAL PERFUSION TOMOGRAPHY, WITH NO   EVIDENCE FOR ISCHEMIA AT THE LEVEL OF EXERCISE ATTAINED.     2. NORMAL RESTING LEFT VENTRICULAR WALL MOTION AND WALL THICKENING.    3. LEFT VENTRICULAR EJECTION FRACTION OF 71% WHICH IS WITHIN RANGE OF   NORMAL.     < end of copied text >      IMPRESSION AND PLAN:

## 2018-04-06 NOTE — PROGRESS NOTE ADULT - SUBJECTIVE AND OBJECTIVE BOX
Neurology Follow up note    Name  RODRIGUEZ KAUFFMAN    HPI:  68 yo male patient, Known to have HTN, DL, gout, ex-smoker, presented for acute onset of dizziness that started while he was at work at 5am today.   The patient reports while he was sitting, he started to have acute onset of dizziness with sensation of blurriness as if he is gonna faint, associated with shortness of breath. nausea and mild chest pain. The chest pain was 2/10 in intensity and non radiating. It was not pressure like pain as per him. There was no plapitations. No cough. No URI symptoms. normal po intake, No vomiting.    The episode lasted for few seconds but was recurring for multiple times. When attempted to stand up and walk, he felt little unsteady and asked his coworker to take him to the ED. No weakness.  He also reports that he is been having these episodes of dizziness that are not positional, for the last few mo but this time it was more severe. Most of the episodes happen in the morning    He reports chronic left shoulder pain of few weeks duration but his shoulder pain is not related to the episode that happened today per him.    He reports that he exercises frequently but never felt any chest pain or SOB.  He denies any family history of heart disease. He quit smoking 30 years ago. He had a stress test done more than 10 years ago and was normal. He does not have a cardiologist and sees Dr Jenkins only who is his PCP. (04 Apr 2018 09:28)      Interval History - No new events.  No dizziness          Vital Signs Last 24 Hrs  T(C): 35.7 (06 Apr 2018 05:27), Max: 36.4 (05 Apr 2018 13:46)  T(F): 96.2 (06 Apr 2018 05:27), Max: 97.6 (05 Apr 2018 13:46)  HR: 61 (06 Apr 2018 05:27) (61 - 70)  BP: 141/82 (05 Apr 2018 20:13) (109/72 - 141/82)  BP(mean): --  RR: 8 (05 Apr 2018 20:13) (8 - 18)  SpO2: 94% (05 Apr 2018 20:12) (94% - 98%)  ICU Vital Signs Last 24 Hrs  T(C): 35.7 (06 Apr 2018 05:27), Max: 36.4 (05 Apr 2018 13:46)  T(F): 96.2 (06 Apr 2018 05:27), Max: 97.6 (05 Apr 2018 13:46)  HR: 61 (06 Apr 2018 05:27) (61 - 70)  BP: 141/82 (05 Apr 2018 20:13) (109/72 - 141/82)  BP(mean): --  ABP: --  ABP(mean): --  RR: 8 (05 Apr 2018 20:13) (8 - 18)  SpO2: 94% (05 Apr 2018 20:12) (94% - 98%)          Neurological Exam:   Mental status: Awake, alert and oriented x3.  Recent and remote memory intact.  Naming, repetition and comprehension intact.  Attention/concentration intact.  No dysarthria, no aphasia.  Fund of knowledge appropriate.    Cranial nerves: Fundoscopic exam demonstrated no abnormalities, pupils equally round and reactive to light, visual fields full, no nystagmus, extraocular muscles intact, V1 through V3 intact bilaterally and symmetric, face symmetric, hearing intact to finger rub, palate elevation symmetric, tongue was midline, sternocleidomastoid/shoulder shrug strength bilaterally 5/5.    Motor:  Normal bulk and tone, strength 5/5 in bilateral upper and lower extremities.   strength 5/5.  Rapid alternating movements intact and symmetric.   Sensation: Intact to light touch, proprioception, and pinprick.  No neglect.   Coordination: No dysmetria on finger-to-nose and heel-to-shin.  No clumsiness.  Reflexes: 2+ in upper and lower extremities, downgoing toes bilaterally  Gait: Narrow and steady. No ataxia.  Romberg negative    Medications  aspirin  chewable 81 milliGRAM(s) Oral daily  atorvastatin 80 milliGRAM(s) Oral at bedtime  enoxaparin Injectable 40 milliGRAM(s) SubCutaneous daily  metoprolol tartrate 25 milliGRAM(s) Oral two times a day      Lab    04-06    142  |  106  |  19  ----------------------------<  122<H>  4.3   |  24  |  1.1    Ca    9.1      06 Apr 2018 07:11      CAPILLARY BLOOD GLUCOSE              Radiology  < from: CT Angio Neck w/ IV Cont (04.04.18 @ 22:31) >  INTERPRETATION:  Clinical History / Reason for exam: Dizziness. Rule out   vertebral basilar insufficiency.    Technique: CTA of the neck was performed with sagittal and coronal   reformatted images as well as 3-D volume rendered images after the   intravenous administration of 70 cc of Optiray 350 contrast.    Comparison: None available at this time.      Findings: There are is a common origin of the innominate andleft common   carotid artery (bovine arch). Normal origin of the left subclavian artery   off the aortic arch.    There is normal contrast filling the bilateral common carotid arteries   with normal carotid bifurcations at C3-4 level.  There is no significant   stenosis involving the bilateral internal carotid arteries.  Normal   branching pattern is noted of the bilateral external carotid arteries.    There is normal contrast filling bilateral vertebral arteries with a   dominant right vertebralartery with small/hypoplastic distal left   vertebral artery.      Impression:     1.  Dominant right vertebral artery.    2.  Bovine arch.    3.  Unremarkable CTA of the neck with contrast.      < end of copied text >    Assessment-Patient with dizziness.  CTA is unremarkable.  Has (+) orthostatic vitals with heart rate elevating by 21 bpm    Plan  1. OK to dc  2. If dizziness persists can f/u with me and cardiology

## 2018-04-06 NOTE — PROGRESS NOTE ADULT - ASSESSMENT
dizziness, doubt cardiac related  may be 2/2 cervical neck pain or radiculopathy  discharge home  cont meds  follow up with dr campbell in 1-2 weeks  full discharge note dictated

## 2018-04-06 NOTE — DISCHARGE NOTE ADULT - PLAN OF CARE
medical management. resolution of symptoms please continue taking same medications as prescribed and follow up with cardiology and Neurology as outpatient for continued care.

## 2018-04-06 NOTE — DISCHARGE NOTE ADULT - CARE PLAN
Principal Discharge DX:	Dizziness  Goal:	medical management. resolution of symptoms  Assessment and plan of treatment:	please continue taking same medications as prescribed and follow up with cardiology and Neurology as outpatient for continued care.

## 2018-04-06 NOTE — DISCHARGE NOTE ADULT - HOSPITAL COURSE
68 yo male patient, Known to have HTN, DL, gout, ex-smoker, presented for acute onset of dizziness, associated with nausea, shortness of breath and mild chest pain, sensation as if he is gonna faint and unsteadiness.   -he was seen by cardiology and neurology , Ischemic work up was negative. CTA head and neck was done.  -hospital course remained uncomplicated  - patient can be discharged and follow up with neurology as outpatient as per dr. Lopez

## 2018-04-06 NOTE — DISCHARGE NOTE ADULT - PATIENT PORTAL LINK FT
You can access the [facility] Patient Portal, offered by [facility], by registering with the following website: [email or web address]

## 2018-04-06 NOTE — DISCHARGE NOTE ADULT - MEDICATION SUMMARY - MEDICATIONS TO TAKE
I will START or STAY ON the medications listed below when I get home from the hospital:    simvastatin 20 mg oral tablet  -- 1 tab(s) by mouth once a day (at bedtime)  -- Indication: For dld    metoprolol tartrate 50 mg oral tablet  -- orally once a day  -- Indication: For Htn

## 2018-04-06 NOTE — PROGRESS NOTE ADULT - SUBJECTIVE AND OBJECTIVE BOX
seen/examined chart reviewed, clinically stable, no dizziness, no chest pain, no sob  c/o left neck pain  cardio note read / appreciated    no jvd, + cervical spine muscle palp pain  cta  rrr  soft nt nd + bs  no e/c/c    normal nuc stress test

## 2018-04-06 NOTE — DISCHARGE NOTE ADULT - CARE PROVIDER_API CALL
Maximo Jerry), Cardiovascular Disease; Internal Medicine; Interventional Cardiology; Nuclear Cardiology  [address] [facility]  [address]  [place] [address]  Phone: [phone]  Fax: [phone]    Jhoan Chu), EEGEpilepsy; Neurology  [address]  [address]  [place] [address]  Phone: [phone]  Fax: [phone]

## 2018-04-09 DIAGNOSIS — E78.5 HYPERLIPIDEMIA, UNSPECIFIED: ICD-10-CM

## 2018-04-09 DIAGNOSIS — R26.81 UNSTEADINESS ON FEET: ICD-10-CM

## 2018-04-09 DIAGNOSIS — Z88.0 ALLERGY STATUS TO PENICILLIN: ICD-10-CM

## 2018-04-09 DIAGNOSIS — R06.02 SHORTNESS OF BREATH: ICD-10-CM

## 2018-04-09 DIAGNOSIS — I10 ESSENTIAL (PRIMARY) HYPERTENSION: ICD-10-CM

## 2018-04-09 DIAGNOSIS — Z87.891 PERSONAL HISTORY OF NICOTINE DEPENDENCE: ICD-10-CM

## 2018-04-09 DIAGNOSIS — R07.9 CHEST PAIN, UNSPECIFIED: ICD-10-CM

## 2018-04-09 DIAGNOSIS — M10.9 GOUT, UNSPECIFIED: ICD-10-CM

## 2018-04-09 DIAGNOSIS — R42 DIZZINESS AND GIDDINESS: ICD-10-CM

## 2018-10-06 ENCOUNTER — OUTPATIENT (OUTPATIENT)
Dept: OUTPATIENT SERVICES | Facility: HOSPITAL | Age: 68
LOS: 1 days | Discharge: HOME | End: 2018-10-06

## 2018-10-06 DIAGNOSIS — Z98.890 OTHER SPECIFIED POSTPROCEDURAL STATES: Chronic | ICD-10-CM

## 2018-10-06 DIAGNOSIS — E78.00 PURE HYPERCHOLESTEROLEMIA, UNSPECIFIED: ICD-10-CM

## 2018-10-06 PROBLEM — I10 ESSENTIAL (PRIMARY) HYPERTENSION: Chronic | Status: ACTIVE | Noted: 2018-04-04

## 2018-10-06 PROBLEM — M10.9 GOUT, UNSPECIFIED: Chronic | Status: ACTIVE | Noted: 2018-04-04

## 2019-02-02 ENCOUNTER — OUTPATIENT (OUTPATIENT)
Dept: OUTPATIENT SERVICES | Facility: HOSPITAL | Age: 69
LOS: 1 days | Discharge: HOME | End: 2019-02-02

## 2019-02-02 DIAGNOSIS — E11.9 TYPE 2 DIABETES MELLITUS WITHOUT COMPLICATIONS: ICD-10-CM

## 2019-02-02 DIAGNOSIS — Z98.890 OTHER SPECIFIED POSTPROCEDURAL STATES: Chronic | ICD-10-CM

## 2019-02-02 DIAGNOSIS — E78.00 PURE HYPERCHOLESTEROLEMIA, UNSPECIFIED: ICD-10-CM

## 2019-02-16 ENCOUNTER — OUTPATIENT (OUTPATIENT)
Dept: OUTPATIENT SERVICES | Facility: HOSPITAL | Age: 69
LOS: 1 days | Discharge: HOME | End: 2019-02-16

## 2019-02-16 DIAGNOSIS — Z98.890 OTHER SPECIFIED POSTPROCEDURAL STATES: Chronic | ICD-10-CM

## 2019-02-16 DIAGNOSIS — R94.5 ABNORMAL RESULTS OF LIVER FUNCTION STUDIES: ICD-10-CM

## 2019-05-14 NOTE — H&P ADULT - NSHPLANGLIMITEDENGLISH_GEN_A_CORE
Calling with interpretor ID #[id number]    LM to advise rx sent to pharmacy  No intercourse  Take twice a day x 1 week    Call back with questions.        No

## 2019-06-06 ENCOUNTER — OUTPATIENT (OUTPATIENT)
Dept: OUTPATIENT SERVICES | Facility: HOSPITAL | Age: 69
LOS: 1 days | Discharge: HOME | End: 2019-06-06

## 2019-06-06 DIAGNOSIS — Z98.890 OTHER SPECIFIED POSTPROCEDURAL STATES: Chronic | ICD-10-CM

## 2019-06-07 DIAGNOSIS — M10.9 GOUT, UNSPECIFIED: ICD-10-CM

## 2020-07-27 NOTE — ED ADULT NURSE REASSESSMENT NOTE - GENERAL PATIENT STATE
The left coronary artery was selectively engaged and injected. Multiple views of the injected vessel were taken.  comfortable appearance

## 2021-06-19 ENCOUNTER — EMERGENCY (EMERGENCY)
Facility: HOSPITAL | Age: 71
LOS: 0 days | Discharge: HOME | End: 2021-06-19
Attending: STUDENT IN AN ORGANIZED HEALTH CARE EDUCATION/TRAINING PROGRAM | Admitting: STUDENT IN AN ORGANIZED HEALTH CARE EDUCATION/TRAINING PROGRAM
Payer: COMMERCIAL

## 2021-06-19 VITALS
HEIGHT: 69 IN | DIASTOLIC BLOOD PRESSURE: 81 MMHG | HEART RATE: 96 BPM | SYSTOLIC BLOOD PRESSURE: 157 MMHG | OXYGEN SATURATION: 96 % | TEMPERATURE: 99 F | RESPIRATION RATE: 20 BRPM | WEIGHT: 199.96 LBS

## 2021-06-19 VITALS
OXYGEN SATURATION: 99 % | SYSTOLIC BLOOD PRESSURE: 132 MMHG | DIASTOLIC BLOOD PRESSURE: 76 MMHG | TEMPERATURE: 98 F | RESPIRATION RATE: 18 BRPM | HEART RATE: 80 BPM

## 2021-06-19 DIAGNOSIS — M10.9 GOUT, UNSPECIFIED: ICD-10-CM

## 2021-06-19 DIAGNOSIS — I10 ESSENTIAL (PRIMARY) HYPERTENSION: ICD-10-CM

## 2021-06-19 DIAGNOSIS — M79.89 OTHER SPECIFIED SOFT TISSUE DISORDERS: ICD-10-CM

## 2021-06-19 DIAGNOSIS — E78.00 PURE HYPERCHOLESTEROLEMIA, UNSPECIFIED: ICD-10-CM

## 2021-06-19 DIAGNOSIS — Z87.891 PERSONAL HISTORY OF NICOTINE DEPENDENCE: ICD-10-CM

## 2021-06-19 DIAGNOSIS — Z98.890 OTHER SPECIFIED POSTPROCEDURAL STATES: Chronic | ICD-10-CM

## 2021-06-19 DIAGNOSIS — Z20.822 CONTACT WITH AND (SUSPECTED) EXPOSURE TO COVID-19: ICD-10-CM

## 2021-06-19 LAB
ALBUMIN SERPL ELPH-MCNC: 3.7 G/DL — SIGNIFICANT CHANGE UP (ref 3.5–5.2)
ALP SERPL-CCNC: 92 U/L — SIGNIFICANT CHANGE UP (ref 30–115)
ALT FLD-CCNC: 32 U/L — SIGNIFICANT CHANGE UP (ref 0–41)
ANION GAP SERPL CALC-SCNC: 11 MMOL/L — SIGNIFICANT CHANGE UP (ref 7–14)
AST SERPL-CCNC: 39 U/L — SIGNIFICANT CHANGE UP (ref 0–41)
BASOPHILS # BLD AUTO: 0.05 K/UL — SIGNIFICANT CHANGE UP (ref 0–0.2)
BASOPHILS NFR BLD AUTO: 0.4 % — SIGNIFICANT CHANGE UP (ref 0–1)
BILIRUB SERPL-MCNC: 0.7 MG/DL — SIGNIFICANT CHANGE UP (ref 0.2–1.2)
BUN SERPL-MCNC: 11 MG/DL — SIGNIFICANT CHANGE UP (ref 10–20)
CALCIUM SERPL-MCNC: 9.1 MG/DL — SIGNIFICANT CHANGE UP (ref 8.5–10.1)
CHLORIDE SERPL-SCNC: 98 MMOL/L — SIGNIFICANT CHANGE UP (ref 98–110)
CO2 SERPL-SCNC: 24 MMOL/L — SIGNIFICANT CHANGE UP (ref 17–32)
CREAT SERPL-MCNC: 0.9 MG/DL — SIGNIFICANT CHANGE UP (ref 0.7–1.5)
EOSINOPHIL # BLD AUTO: 0.08 K/UL — SIGNIFICANT CHANGE UP (ref 0–0.7)
EOSINOPHIL NFR BLD AUTO: 0.6 % — SIGNIFICANT CHANGE UP (ref 0–8)
GLUCOSE SERPL-MCNC: 127 MG/DL — HIGH (ref 70–99)
HCT VFR BLD CALC: 45.6 % — SIGNIFICANT CHANGE UP (ref 42–52)
HGB BLD-MCNC: 15.9 G/DL — SIGNIFICANT CHANGE UP (ref 14–18)
IMM GRANULOCYTES NFR BLD AUTO: 0.6 % — HIGH (ref 0.1–0.3)
LACTATE SERPL-SCNC: 1.4 MMOL/L — SIGNIFICANT CHANGE UP (ref 0.7–2)
LYMPHOCYTES # BLD AUTO: 16.5 % — LOW (ref 20.5–51.1)
LYMPHOCYTES # BLD AUTO: 2.16 K/UL — SIGNIFICANT CHANGE UP (ref 1.2–3.4)
MCHC RBC-ENTMCNC: 31.4 PG — HIGH (ref 27–31)
MCHC RBC-ENTMCNC: 34.9 G/DL — SIGNIFICANT CHANGE UP (ref 32–37)
MCV RBC AUTO: 89.9 FL — SIGNIFICANT CHANGE UP (ref 80–94)
MONOCYTES # BLD AUTO: 1.07 K/UL — HIGH (ref 0.1–0.6)
MONOCYTES NFR BLD AUTO: 8.2 % — SIGNIFICANT CHANGE UP (ref 1.7–9.3)
NEUTROPHILS # BLD AUTO: 9.68 K/UL — HIGH (ref 1.4–6.5)
NEUTROPHILS NFR BLD AUTO: 73.7 % — SIGNIFICANT CHANGE UP (ref 42.2–75.2)
NRBC # BLD: 0 /100 WBCS — SIGNIFICANT CHANGE UP (ref 0–0)
PLATELET # BLD AUTO: 195 K/UL — SIGNIFICANT CHANGE UP (ref 130–400)
POTASSIUM SERPL-MCNC: 5.6 MMOL/L — HIGH (ref 3.5–5)
POTASSIUM SERPL-SCNC: 5.6 MMOL/L — HIGH (ref 3.5–5)
PROT SERPL-MCNC: 7.3 G/DL — SIGNIFICANT CHANGE UP (ref 6–8)
RBC # BLD: 5.07 M/UL — SIGNIFICANT CHANGE UP (ref 4.7–6.1)
RBC # FLD: 12.9 % — SIGNIFICANT CHANGE UP (ref 11.5–14.5)
SARS-COV-2 RNA SPEC QL NAA+PROBE: SIGNIFICANT CHANGE UP
SODIUM SERPL-SCNC: 133 MMOL/L — LOW (ref 135–146)
WBC # BLD: 13.12 K/UL — HIGH (ref 4.8–10.8)
WBC # FLD AUTO: 13.12 K/UL — HIGH (ref 4.8–10.8)

## 2021-06-19 PROCEDURE — 99284 EMERGENCY DEPT VISIT MOD MDM: CPT

## 2021-06-19 PROCEDURE — 73130 X-RAY EXAM OF HAND: CPT | Mod: 26,LT

## 2021-06-19 RX ORDER — DIPHENHYDRAMINE HCL 50 MG
25 CAPSULE ORAL ONCE
Refills: 0 | Status: COMPLETED | OUTPATIENT
Start: 2021-06-19 | End: 2021-06-19

## 2021-06-19 RX ORDER — DEXAMETHASONE 0.5 MG/5ML
10 ELIXIR ORAL ONCE
Refills: 0 | Status: COMPLETED | OUTPATIENT
Start: 2021-06-19 | End: 2021-06-19

## 2021-06-19 RX ORDER — CEPHALEXIN 500 MG
1 CAPSULE ORAL
Qty: 28 | Refills: 0
Start: 2021-06-19 | End: 2021-06-25

## 2021-06-19 RX ORDER — ACETAMINOPHEN 500 MG
975 TABLET ORAL ONCE
Refills: 0 | Status: COMPLETED | OUTPATIENT
Start: 2021-06-19 | End: 2021-06-19

## 2021-06-19 RX ADMIN — Medication 975 MILLIGRAM(S): at 14:57

## 2021-06-19 RX ADMIN — Medication 10 MILLIGRAM(S): at 14:58

## 2021-06-19 RX ADMIN — Medication 25 MILLIGRAM(S): at 14:58

## 2021-06-19 NOTE — ED PROVIDER NOTE - CARE PLAN
Principal Discharge DX:	Hand swelling  Secondary Diagnosis:	Foot swelling  Secondary Diagnosis:	Blister

## 2021-06-19 NOTE — ED PROVIDER NOTE - CARE PROVIDERS DIRECT ADDRESSES
,DirectAddress_Unknown,[email or web address]

## 2021-06-19 NOTE — ED PROVIDER NOTE - CLINICAL SUMMARY MEDICAL DECISION MAKING FREE TEXT BOX
.  72 y/o M pmh HTN DLD, gout, p/w generalized, atraumatic L hand swelling x1d. Hand swelling similar to prior, occurs when is having a gout flare (+ L MTP swelling and pain x1wk, being treated w/ steroid for gout). Pt here bc swelling longer lasting and w/ small amount of draining from small blisters at volar wrist. No fever. hand has some erythema, but diffuse, not brawny.     Labs and imaging reviewed. NL lft and renal function. Pt got analgesia and steroid. swelling improved in ED.    IMP: hand swelling, consider rheumatologic etiology; does not seem infectious, but some erythema present.  P: dc home w/ cont outpt w/up, wait and see abx rx, and supportive care. Pt understands signs and symptoms for ED return. dc home.  IMP: hand s

## 2021-06-19 NOTE — ED PROVIDER NOTE - OBJECTIVE STATEMENT
70 yo M hx of  high cholesterol, HTN, gout, borderline DM c/o left hand swelling  yesterday. Patient had left big toe swelling and treated for gout with [place] [facility] [place]. Yesterday with swelling to left hand which got worse today and now with right hand swelling as well. No fever. Right hand dominant.

## 2021-06-19 NOTE — ED ADULT NURSE NOTE - OBJECTIVE STATEMENT
Patient c.o left hand swelling and redness for the past few days. Denies traumatic injury. Hx of gout.

## 2021-06-19 NOTE — ED PROVIDER NOTE - PHYSICAL EXAMINATION
Physical Exam    Vital Signs: I have reviewed the initial vital signs.  Constitutional: well-nourished, appears stated age, no acute distress  Skin: warm dry. + redness to left 1st MTP and left hand. + blisters to palm of left hand and dorsum of left wrist  Muskuloskeletal: LUE: +redness, tenderness, ++swelling to left hand with blisters to dorsum of left wrist and palm of left hand. Decreased ROM left hand. Right hand: + swelling to dorsum by 3-5th MC. ROM intact. + redness, swelling,  and tenderness to left 1st MTP.  n/v intact  Neuro: AOx3, No focal deficits noted

## 2021-06-19 NOTE — ED PROVIDER NOTE - NSFOLLOWUPINSTRUCTIONS_ED_ALL_ED_FT
Please follow up with hand surgery and rheumatology.  Return for fevers, increased swelling, pain, worsening of symptoms or any other concerns.     Blisters, Adult    A blister is a raised bubble of skin filled with liquid. Blisters often develop in an area of the skin that repeatedly rubs or presses against another surface (friction blister). Friction blisters can occur on any part of the body, but they usually develop on the hands or feet. Long-term pressure on the same area of the skin can also lead to areas of hardened skin (calluses).    What are the causes?  A blister can be caused by:    An injury.  A burn.  An allergic reaction.  An infection.  Exposure to irritating chemicals.  Friction, especially in an area with a lot of heat and moisture.    Friction blisters often result from:    Sports.  Repetitive activities.  Using tools and doing other activities without wearing gloves.  Shoes that are too tight or too loose.    What are the signs or symptoms?  A blister is often round and looks like a bump. It may:    Itch.  Be painful to the touch.    Before a blister forms, the skin may:    Become red.  Feel warm.  Itch.  Be painful to the touch.    How is this diagnosed?  A blister is diagnosed with a physical exam.    How is this treated?  Treatment usually involves protecting the area where the blister has formed until the skin has healed. Other treatments may include:    A bandage (dressing) to cover the blister.  Extra padding around and over the blister, so that it does not rub on anything.  Antibiotic ointment.    Most blisters break open, dry up, and go away on their own within 1–2 weeks. Blisters that are very painful may be drained before they break open on their own. If the blister is large or painful, it can be drained by:    Sterilizing a small needle with rubbing alcohol.    Washing your hands with soap and water.    Inserting the needle in the edge of the blister to make a small hole. Some fluid will drain out of the hole. Let the top or roof of the blister stay in place. This helps the skin heal.    Washing the blister with mild soap and water.    Covering the blister with antibiotic ointment, if prescribed by your health care provider, and a dressing.      Some blisters may need to be drained by a health care provider.    Follow these instructions at home:  Protect the area where the blister has formed as told by your health care provider.  Keep your blister clean and dry. This helps to prevent infection.  Do not pop your blister. This can cause infection.  ImageIf you were prescribed an antibiotic, use it as told by your health care provider. Do not stop using the antibiotic even if your condition improves.  Wear different shoes until the blister heals.  Avoid the activity that caused the blister until your blister heals.  Check your blister every day for signs of infection. Check for:    More redness, swelling, or pain.  More fluid or blood.  Warmth.  Pus or a bad smell.  The blister getting better and then getting worse.    How is this prevented?  Taking these steps can help to prevent blisters that are caused by friction. Make sure you:    Wear comfortable shoes that fit well.  Always wear socks with shoes.  Wear extra socks or use tape, bandages, or pads over blister-prone areas as needed. You may also apply petroleum jelly under bandages in blister-prone areas.  Wear protective gear, such as gloves, when participating in sports or activities that can cause blisters.  Wear loose-fitting, moisture-wicking clothes when participating in sports or activities.  Use powders as needed to keep your feet dry.    Contact a health care provider if:  You have more redness, swelling, or pain around your blister.  You have more fluid or blood coming from your blister.  Your blister feels warm to the touch.  You have pus or a bad smell coming from your blister.  You have a fever or chills.  Your blister gets better and then it gets worse.  This information is not intended to replace advice given to you by your health care provider. Make sure you discuss any questions you have with your health care provider.

## 2021-06-19 NOTE — ED PROVIDER NOTE - PATIENT PORTAL LINK FT
You can access the FollowMyHealth Patient Portal offered by [facility] by registering at the following website: [email or web address]. By joining [employer]’s FollowMyHealth portal, you will also be able to view your health information using other applications (apps) compatible with our system.

## 2021-06-19 NOTE — ED PROVIDER NOTE - CARE PROVIDER_API CALL
your PMD,   Phone: (   )    -  Fax: (   )    -  Follow Up Time: 1-3 Days    Kiya Aragon  [address] [facility] [place] [address]  Phone: [phone]  Fax: [phone]  Follow Up Time: 1-3 Days    Eva Paula)  [facility]  [address] [place] [address], [address]  [place] [address]  Phone: [phone]  Fax: [phone]  Follow Up Time: 1-3 Days

## 2021-06-19 NOTE — ED PROVIDER NOTE - PROVIDER TOKENS
FREE:[LAST:[your PMD],PHONE:[(   )    -],FAX:[(   )    -],FOLLOWUP:[1-3 Days]],PROVIDER:[TOKEN:[[id number]],FOLLOWUP:[1-3 Days]],PROVIDER:[TOKEN:[[id number]],FOLLOWUP:[1-3 Days]]

## 2021-08-10 NOTE — PATIENT PROFILE ADULT. - FUNCTIONAL SCREEN CURRENT LEVEL: DRESSING, MLM
(0) independent Xolair Pregnancy And Lactation Text: This medication is Pregnancy Category B and is considered safe during pregnancy. This medication is excreted in breast milk.

## 2021-11-12 ENCOUNTER — INPATIENT (INPATIENT)
Facility: HOSPITAL | Age: 71
LOS: 0 days | Discharge: HOME | End: 2021-11-13
Attending: STUDENT IN AN ORGANIZED HEALTH CARE EDUCATION/TRAINING PROGRAM | Admitting: STUDENT IN AN ORGANIZED HEALTH CARE EDUCATION/TRAINING PROGRAM
Payer: COMMERCIAL

## 2021-11-12 VITALS
OXYGEN SATURATION: 98 % | SYSTOLIC BLOOD PRESSURE: 133 MMHG | WEIGHT: 199.96 LBS | TEMPERATURE: 97 F | HEART RATE: 68 BPM | DIASTOLIC BLOOD PRESSURE: 68 MMHG | RESPIRATION RATE: 20 BRPM | HEIGHT: 69 IN

## 2021-11-12 DIAGNOSIS — Z98.890 OTHER SPECIFIED POSTPROCEDURAL STATES: Chronic | ICD-10-CM

## 2021-11-12 LAB
ALBUMIN SERPL ELPH-MCNC: 4.4 G/DL — SIGNIFICANT CHANGE UP (ref 3.5–5.2)
ALP SERPL-CCNC: 98 U/L — SIGNIFICANT CHANGE UP (ref 30–115)
ALT FLD-CCNC: 50 U/L — HIGH (ref 0–41)
ANION GAP SERPL CALC-SCNC: 14 MMOL/L — SIGNIFICANT CHANGE UP (ref 7–14)
AST SERPL-CCNC: 35 U/L — SIGNIFICANT CHANGE UP (ref 0–41)
BASOPHILS # BLD AUTO: 0.06 K/UL — SIGNIFICANT CHANGE UP (ref 0–0.2)
BASOPHILS NFR BLD AUTO: 0.8 % — SIGNIFICANT CHANGE UP (ref 0–1)
BILIRUB SERPL-MCNC: 0.7 MG/DL — SIGNIFICANT CHANGE UP (ref 0.2–1.2)
BUN SERPL-MCNC: 13 MG/DL — SIGNIFICANT CHANGE UP (ref 10–20)
CALCIUM SERPL-MCNC: 9.5 MG/DL — SIGNIFICANT CHANGE UP (ref 8.5–10.1)
CHLORIDE SERPL-SCNC: 104 MMOL/L — SIGNIFICANT CHANGE UP (ref 98–110)
CO2 SERPL-SCNC: 22 MMOL/L — SIGNIFICANT CHANGE UP (ref 17–32)
CREAT SERPL-MCNC: 1.1 MG/DL — SIGNIFICANT CHANGE UP (ref 0.7–1.5)
EOSINOPHIL # BLD AUTO: 0.06 K/UL — SIGNIFICANT CHANGE UP (ref 0–0.7)
EOSINOPHIL NFR BLD AUTO: 0.8 % — SIGNIFICANT CHANGE UP (ref 0–8)
GLUCOSE SERPL-MCNC: 124 MG/DL — HIGH (ref 70–99)
HCT VFR BLD CALC: 46.8 % — SIGNIFICANT CHANGE UP (ref 42–52)
HGB BLD-MCNC: 15.6 G/DL — SIGNIFICANT CHANGE UP (ref 14–18)
IMM GRANULOCYTES NFR BLD AUTO: 0.4 % — HIGH (ref 0.1–0.3)
LYMPHOCYTES # BLD AUTO: 1.64 K/UL — SIGNIFICANT CHANGE UP (ref 1.2–3.4)
LYMPHOCYTES # BLD AUTO: 20.5 % — SIGNIFICANT CHANGE UP (ref 20.5–51.1)
MCHC RBC-ENTMCNC: 31.1 PG — HIGH (ref 27–31)
MCHC RBC-ENTMCNC: 33.3 G/DL — SIGNIFICANT CHANGE UP (ref 32–37)
MCV RBC AUTO: 93.4 FL — SIGNIFICANT CHANGE UP (ref 80–94)
MONOCYTES # BLD AUTO: 0.51 K/UL — SIGNIFICANT CHANGE UP (ref 0.1–0.6)
MONOCYTES NFR BLD AUTO: 6.4 % — SIGNIFICANT CHANGE UP (ref 1.7–9.3)
NEUTROPHILS # BLD AUTO: 5.69 K/UL — SIGNIFICANT CHANGE UP (ref 1.4–6.5)
NEUTROPHILS NFR BLD AUTO: 71.1 % — SIGNIFICANT CHANGE UP (ref 42.2–75.2)
NRBC # BLD: 0 /100 WBCS — SIGNIFICANT CHANGE UP (ref 0–0)
NT-PROBNP SERPL-SCNC: 184 PG/ML — SIGNIFICANT CHANGE UP (ref 0–300)
PLATELET # BLD AUTO: 158 K/UL — SIGNIFICANT CHANGE UP (ref 130–400)
POTASSIUM SERPL-MCNC: 5.2 MMOL/L — HIGH (ref 3.5–5)
POTASSIUM SERPL-SCNC: 5.2 MMOL/L — HIGH (ref 3.5–5)
PROT SERPL-MCNC: 7.3 G/DL — SIGNIFICANT CHANGE UP (ref 6–8)
RBC # BLD: 5.01 M/UL — SIGNIFICANT CHANGE UP (ref 4.7–6.1)
RBC # FLD: 12.9 % — SIGNIFICANT CHANGE UP (ref 11.5–14.5)
SARS-COV-2 RNA SPEC QL NAA+PROBE: SIGNIFICANT CHANGE UP
SODIUM SERPL-SCNC: 140 MMOL/L — SIGNIFICANT CHANGE UP (ref 135–146)
TROPONIN T SERPL-MCNC: <0.01 NG/ML — SIGNIFICANT CHANGE UP
TROPONIN T SERPL-MCNC: <0.01 NG/ML — SIGNIFICANT CHANGE UP
WBC # BLD: 7.99 K/UL — SIGNIFICANT CHANGE UP (ref 4.8–10.8)
WBC # FLD AUTO: 7.99 K/UL — SIGNIFICANT CHANGE UP (ref 4.8–10.8)

## 2021-11-12 PROCEDURE — 93010 ELECTROCARDIOGRAM REPORT: CPT

## 2021-11-12 PROCEDURE — 93458 L HRT ARTERY/VENTRICLE ANGIO: CPT | Mod: 26

## 2021-11-12 PROCEDURE — 93010 ELECTROCARDIOGRAM REPORT: CPT | Mod: 77

## 2021-11-12 PROCEDURE — 71275 CT ANGIOGRAPHY CHEST: CPT | Mod: 26,MA

## 2021-11-12 PROCEDURE — 93308 TTE F-UP OR LMTD: CPT | Mod: 26

## 2021-11-12 PROCEDURE — 74174 CTA ABD&PLVS W/CONTRAST: CPT | Mod: 26,MA

## 2021-11-12 PROCEDURE — 71046 X-RAY EXAM CHEST 2 VIEWS: CPT | Mod: 26

## 2021-11-12 PROCEDURE — 99222 1ST HOSP IP/OBS MODERATE 55: CPT | Mod: 25

## 2021-11-12 PROCEDURE — 99223 1ST HOSP IP/OBS HIGH 75: CPT | Mod: GC

## 2021-11-12 PROCEDURE — 99291 CRITICAL CARE FIRST HOUR: CPT | Mod: 25

## 2021-11-12 RX ORDER — ASPIRIN/CALCIUM CARB/MAGNESIUM 324 MG
81 TABLET ORAL DAILY
Refills: 0 | Status: DISCONTINUED | OUTPATIENT
Start: 2021-11-12 | End: 2021-11-13

## 2021-11-12 RX ORDER — SODIUM CHLORIDE 9 MG/ML
1000 INJECTION INTRAMUSCULAR; INTRAVENOUS; SUBCUTANEOUS
Refills: 0 | Status: DISCONTINUED | OUTPATIENT
Start: 2021-11-12 | End: 2021-11-12

## 2021-11-12 RX ORDER — INFLUENZA VIRUS VACCINE 15; 15; 15; 15 UG/.5ML; UG/.5ML; UG/.5ML; UG/.5ML
0.7 SUSPENSION INTRAMUSCULAR ONCE
Refills: 0 | Status: DISCONTINUED | OUTPATIENT
Start: 2021-11-12 | End: 2021-11-13

## 2021-11-12 RX ORDER — METOPROLOL TARTRATE 50 MG
50 TABLET ORAL THREE TIMES A DAY
Refills: 0 | Status: DISCONTINUED | OUTPATIENT
Start: 2021-11-12 | End: 2021-11-13

## 2021-11-12 RX ORDER — SODIUM CHLORIDE 9 MG/ML
1000 INJECTION INTRAMUSCULAR; INTRAVENOUS; SUBCUTANEOUS
Refills: 0 | Status: DISCONTINUED | OUTPATIENT
Start: 2021-11-12 | End: 2021-11-13

## 2021-11-12 RX ORDER — ATORVASTATIN CALCIUM 80 MG/1
40 TABLET, FILM COATED ORAL AT BEDTIME
Refills: 0 | Status: DISCONTINUED | OUTPATIENT
Start: 2021-11-12 | End: 2021-11-13

## 2021-11-12 RX ORDER — ASPIRIN/CALCIUM CARB/MAGNESIUM 324 MG
325 TABLET ORAL ONCE
Refills: 0 | Status: COMPLETED | OUTPATIENT
Start: 2021-11-12 | End: 2021-11-12

## 2021-11-12 RX ADMIN — Medication 50 MILLIGRAM(S): at 22:27

## 2021-11-12 RX ADMIN — Medication 325 MILLIGRAM(S): at 08:19

## 2021-11-12 RX ADMIN — ATORVASTATIN CALCIUM 40 MILLIGRAM(S): 80 TABLET, FILM COATED ORAL at 22:26

## 2021-11-12 RX ADMIN — SODIUM CHLORIDE 100 MILLILITER(S): 9 INJECTION INTRAMUSCULAR; INTRAVENOUS; SUBCUTANEOUS at 22:26

## 2021-11-12 NOTE — H&P ADULT - HISTORY OF PRESENT ILLNESS
incomplete //  medicine resident to complete h&p.   call mar [phone] if questions on who's assigned.  incomplete //  medicine resident to complete h&p med rec and admission orders.   call mar [phone] if questions on who's assigned.  This is a case of a 72 y/o gentleman KTH HTN, DL, gout, and pre-DM presenting for chest pain. Patient experienced severe midsternal nonradiating chest pain that woke up this morning at 3AM. Pain lasted for a few seconds. It was shortly followed by SOB, dizziness, diaphoresis and numbness in his L hand about 1 hour later as he was leaving his car to go to his office for work. He first thought it was a panic attack but when his symptoms persisted he drove to [facility].  He denies having any similar episodes in the past, and did not endorse any chest pain at the time of my exam.  He denies change in symptoms with exertion/position/deep breathing/after meals.   He has no significant personal or family cardiac hx.  In the ED:    T 97.4    /68      HR 68      RR 20    SpO2 98% on RA  ECG: NSR w/ TWI in anteriolateral leads  2 sets trop -ve    CTA chest -ve for aortic dissection  He was loaded with 325mg of ASA in the ED  Interventional cardiology assessed him in the ED, planned for cath this afternoon.  Admit to medicine for unstable angina

## 2021-11-12 NOTE — H&P ADULT - NSHPLABSRESULTS_GEN_ALL_CORE
LABS:  cret                        15.6   7.99  )-----------( 158      ( 2021 08:00 )             46.8         140  |  104  |  13  ----------------------------<  124<H>  5.2<H>   |  22  |  1.1    Ca    9.5      2021 08:00    TPro  7.3  /  Alb  4.4  /  TBili  0.7  /  DBili  x   /  AST  35  /  ALT  50<H>  /  AlkPhos  98          RADIOLOGY:    - CXR:  No radiographic evidence of acute cardiopulmonary disease.    - CT ANGIO CHEST AORTA WAWIC          No evidence of aortic dissection or aneurysm.  Right lower lobe 1.5 cm groundglass opacity. Follow-up noncontrast chest CT is recommended in 3 months.  Indeterminate pancreatic head hypodensity measuring 1.2 cm. Outpatient pancreatic MRI with gadolinium and MRCP imaging is recommended.  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  Likely right sided pericardial cyst  Indeterminate thyroid nodules    - EC Lead ECG:   Ventricular Rate 70 BPM  Atrial Rate 70 BPM  P-R Interval 160 ms  QRS Duration 94 ms  Q-T Interval 382 ms  QTC Calculation(Bazett) 412 ms  P Axis 35 degrees  R Axis 60 degrees  T Axis 175 degrees    Diagnosis Line Normal sinus rhythm  Marked T wave abnormality, consider anterolateral ischemia  Abnormal ECG

## 2021-11-12 NOTE — H&P ADULT - ASSESSMENT
This is a case of a 70 y/o gentleman KTH HTN, DL, gout, and pre-DM admitted for unstable angina      # Acute Coronary Syndrome - Unstable Angina  - No prior significant personal or family cardiac history  - ECG: NSR w/ TWI in anteriolateral leads  - 2 sets trop -ve  -   - CXR -ve  - CTA chest -ve for aortic dissection  - Loaded with 325mg of ASA in the ED  - Asa 81mg qD  - Lipittor 40 qD  - Metoprolol 50mg qD  - Planned for coronary angiography this PM  - F/U AM iron studies, A1c, lipid profile, A1c and TSH  - TTE in the AM  - Cardiac monitoring  - Trend 3rd set troponin  - F/u Cardio recs post cath    # HTN - Controlled  - C/w metoprolol    # DL  - take simvastatin 20mg qD at home  - switched to atorvastatin 40mg qD  - F/u AM A1c    # Pre-DM  - not on any medication  - A1c 6.1 in April 2018  - F/u A1c in AM  - Consider metformin on D/c    # Gout  - Not on any meds  - F/u AM uric acid    Diet: NPO for now  Activity: IAT  DVT Prophylaxis: Hold for now pending cath  GI Prophylaxis: not indicated  CHG Order  Code Status: Full  Disposition: cardiac cath    This is a case of a 70 y/o gentleman KTH HTN, DL, gout, and pre-DM admitted for unstable angina      # Acute Coronary Syndrome - Unstable Angina  - No prior significant personal or family cardiac history  - ECG: NSR w/ TWI in anteriolateral leads  - 2 sets trop -ve  -   - CXR -ve  - CTA chest -ve for aortic dissection  - Loaded with 325mg of ASA in the ED  - Asa 81mg qD  - Lipitor 40 qD  - Metoprolol 50mg qD  - Planned for coronary angiography this PM  - F/U AM iron studies, A1c, lipid profile, A1c and TSH  - TTE in the AM  - Cardiac monitoring  - Trend 3rd set troponin  - F/u Cardio recs post cath    # HTN - Controlled  - C/w metoprolol    # DL  - take simvastatin 20mg qD at home  - switched to atorvastatin 40mg qD  - F/u AM A1c    # Pre-DM  - not on any medication  - A1c 6.1 in April 2018  - F/u A1c in AM  - Consider metformin on D/c    # Gout  - Not on any meds  - F/u AM uric acid    Diet: NPO for now  Activity: IAT  DVT Prophylaxis: Hold for now pending cath  GI Prophylaxis: not indicated  CHG Order  Code Status: Full  Disposition: cardiac cath

## 2021-11-12 NOTE — CONSULT NOTE ADULT - SUBJECTIVE AND OBJECTIVE BOX
Outpt cardiologist:    HPI:  Pt is a 70 y/o M former smoker (quit 30 years ago) w/ a PMHx of HTN, HLD, gout, and pre-DM presenting for chest pain. Pt woke up this morning at 3AM and felt searing substernal chest pain that lasted for a few seconds while he was still laying in bed then went away. The pt then drove to his work in [place] where he began to feel SOB, dizziness, and numbness in his L hand about 1 hour later upon stepping out of his car. He denies CP at this time. He sat in his office then decided to drive to [facility] ED. Pt denies having any similar episodes in the past, denies change in symptoms with exertion/position/deep breathing/after meals. No significant family cardiac hx. Pt takes metoprolol tartrate 50mg qd, simvastatin 20mg qd, and OTC Purge! for gout.     In the ED: EKG showed TWI in anterolateral leads.   Troponin neg x2, .   CT was negative for aortic dissection or aneurysm. Showed R sided pericardial cyst.   CXR neg.   pt given ASA    ---  cardio fellow additional notes:    coronary calcifications on CT  likely unstable angina  pt denies current CP but c/o SOB    PAST MEDICAL & SURGICAL HISTORY  Hypertension    High cholesterol    Gout    pre-DM    History of appendectomy        FAMILY HISTORY:  FAMILY HISTORY:  Family history of pancreatic cancer (Father)  father    Family history of breast cancer (Mother)        SOCIAL HISTORY:  Social History:      ALLERGIES:  No Known Allergies      MEDICATIONS:    PRN:      HOME MEDICATIONS:  Home Medications:  metoprolol tartrate 50 mg oral tablet: orally once a day (2018 06:25)  simvastatin 20 mg oral tablet: 1 tab(s) orally once a day (at bedtime) (2018 06:25)      VITALS:   T(F): 97.3 ( @ 13:46), Max: 97.4 (11 @ 07:22)  HR: 62 ( @ 13:46) (62 - 68)  BP: 128/68 (11- @ 13:46) (128/68 - 133/68)  BP(mean): --  RR: 18 ( @ 13:46) (18 - 20)  SpO2: 97% ( @ 13:46) (97% - 98%)    I&O's Summary      REVIEW OF SYSTEMS:  CONSTITUTIONAL: No weakness, fevers or chills  HEENT: No visual changes, neck/ear pain  RESPIRATORY: No cough   CARDIOVASCULAR: See HPI  GASTROINTESTINAL: No abdominal pain. No nausea, vomiting, diarrhea   GENITOURINARY: No dysuria, frequency or hematuria  NEUROLOGICAL: No new focal deficits  SKIN: No new rashes    PHYSICAL EXAM:  General: Not in distress.  Non-toxic appearing.   HEENT: EOMI  Cardio: regular, S1, S2, no murmur  Pulm: B/L BS.  No wheezing / crackles / rales  Abdomen: Soft, non-tender, non-distended. Normoactive bowel sounds  Extremities: No edema b/l le  Neuro: A&O x3. No focal deficits    LABS:                        15.6   7.99  )-----------( 158      ( 2021 08:00 )             46.8         140  |  104  |  13  ----------------------------<  124<H>  5.2<H>   |  22  |  1.1    Ca    9.5      2021 08:00    TPro  7.3  /  Alb  4.4  /  TBili  0.7  /  DBili  x   /  AST  35  /  ALT  50<H>  /  AlkPhos  98        Troponin T, Serum: <0.01 ng/mL (21 @ 08:00)    CARDIAC MARKERS ( 2021 08:00 )  x     / <0.01 ng/mL / x     / x     / x            Troponin trend:  <0.01 x2      Serum Pro-Brain Natriuretic Peptide: 184 pg/mL (21 @ 08:00)      COVID-19 PCR: NotDetec (2021 13:04)      RADIOLOGY:  -CXR:  EXAM:  XR CHEST PA LAT 2V          PROCEDURE DATE:  2021      INTERPRETATION:  Clinical History / Reason for exam: Chest pain    Comparison : Chest radiograph 2014.    Technique/Positioning: PA and lateral views of the chest.    Findings:    Support devices: None.    Cardiac/mediastinum/hilum: Unremarkable.    Lung parenchyma/Pleura: Within normal limits.    Skeleton/soft tissues: Unremarkable.    Impression:    No radiographic evidence of acute cardiopulmonary disease.    -TTE:  -CCTA:  EXAM:  CT ANGIO CHEST AORTA WAWIC        EXAM:  CT ANGIO ABD PELV (W)AW IC        PROCEDURE DATE:  2021    INTERPRETATION:  Clinical History / Reason for exam: Dissection    A CT angiogram of the chest, abdomen and pelvis was performed after the administration of 100 cc of Omnipaque 350 intravenous contrast. Initially, noncontrast images through the chest were obtained. No oral contrast was given. Sagittal, coronal and MIP reformats were performed.    Study is compared to prior chest CT dated 2014    No evidence of aortic dissection or aneurysm.    Right lower lobe 1.5 cm groundglass opacity. Follow-up noncontrast chest CT is recommended in 3 months.    Indeterminate pancreatic head hypodensity measuring 1.2 cm. Outpatient pancreatic MRI with gadolinium and MRCP imaging is recommended.    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    Likely right sided pericardial cyst    Indeterminate thyroid nodules  -STRESS TEST:  -CATHETERIZATION:  -OTHER:  EC Lead ECG:   Ventricular Rate 70 BPM    Atrial Rate 70 BPM    P-R Interval 160 ms    QRS Duration 94 ms    Q-T Interval 382 ms    QTC Calculation(Bazett) 412 ms    P Axis 35 degrees    R Axis 60 degrees    T Axis 175 degrees    Diagnosis Line Normal sinus rhythm  Marked T wave abnormality, consider anterolateral ischemia  Abnormal ECG    Confirmed by AMBIKA PAUL MD ([id number]) on 2021 9:02:46 AM ( @ 07:24)      TELEMETRY EVENTS:   Outpt cardiologist:    HPI:    Pt is a 72 y/o M former smoker (quit 30 years ago) w/ a PMHx of HTN, HLD, gout, and pre-DM presenting for chest pain. Pt woke up this morning at 3AM and felt searing substernal chest pain that lasted for a few seconds while he was still laying in bed then went away. The pt then drove to his work in [place] where he began to feel SOB, dizziness, and numbness in his L hand about 1 hour later upon stepping out of his car. He denies CP at this time. He sat in his office then decided to drive to [facility] ED. Pt denies having any similar episodes in the past, denies change in symptoms with exertion/position/deep breathing/after meals. No significant family cardiac hx. Pt takes metoprolol tartrate 50mg qd, simvastatin 20mg qd, and OTC Purge! for gout.     In the ED: EKG showed TWI in anterolateral leads.   Troponin neg x2, .   CT was negative for aortic dissection or aneurysm. Showed R sided pericardial cyst.   CXR neg.   pt given ASA    ---  cardio fellow additional notes:    coronary calcifications on CT  likely unstable angina  pt denies current CP but c/o SOB    PAST MEDICAL & SURGICAL HISTORY  Hypertension    High cholesterol    Gout    pre-DM    History of appendectomy        FAMILY HISTORY:  FAMILY HISTORY:  Family history of pancreatic cancer (Father)  father    Family history of breast cancer (Mother)        SOCIAL HISTORY:  Social History:      ALLERGIES:  No Known Allergies      MEDICATIONS:    PRN:      HOME MEDICATIONS:  Home Medications:  metoprolol tartrate 50 mg oral tablet: orally once a day (2018 06:25)  simvastatin 20 mg oral tablet: 1 tab(s) orally once a day (at bedtime) (2018 06:25)      VITALS:   T(F): 97.3 ( @ 13:46), Max: 97.4 (11 @ 07:22)  HR: 62 ( @ 13:46) (62 - 68)  BP: 128/68 (11- @ 13:46) (128/68 - 133/68)  BP(mean): --  RR: 18 ( @ 13:46) (18 - 20)  SpO2: 97% ( @ 13:46) (97% - 98%)    I&O's Summary      REVIEW OF SYSTEMS:    CONSTITUTIONAL: No weakness, fevers or chills  HEENT: No visual changes, neck/ear pain  RESPIRATORY: No cough   CARDIOVASCULAR: See HPI  GASTROINTESTINAL: No abdominal pain. No nausea, vomiting, diarrhea   GENITOURINARY: No dysuria, frequency or hematuria  NEUROLOGICAL: No new focal deficits  SKIN: No new rashes    PHYSICAL EXAM:    General: Not in distress.  Non-toxic appearing.   HEENT: EOMI  Cardio: regular, S1, S2, no murmur  Pulm: B/L BS.  No wheezing / crackles / rales  Abdomen: Soft, non-tender, non-distended. Normoactive bowel sounds  Extremities: No edema b/l le  Neuro: A&O x3. No focal deficits    LABS:                        15.6   7.99  )-----------( 158      ( 2021 08:00 )             46.8         140  |  104  |  13  ----------------------------<  124<H>  5.2<H>   |  22  |  1.1    Ca    9.5      2021 08:00    TPro  7.3  /  Alb  4.4  /  TBili  0.7  /  DBili  x   /  AST  35  /  ALT  50<H>  /  AlkPhos  98        Troponin T, Serum: <0.01 ng/mL (21 @ 08:00)    CARDIAC MARKERS ( 2021 08:00 )  x     / <0.01 ng/mL / x     / x     / x            Troponin trend:  <0.01 x2      Serum Pro-Brain Natriuretic Peptide: 184 pg/mL (21 @ 08:00)      COVID-19 PCR: NotDetec (2021 13:04)      RADIOLOGY:  -CXR:  EXAM:  XR CHEST PA LAT 2V          PROCEDURE DATE:  2021      INTERPRETATION:  Clinical History / Reason for exam: Chest pain    Comparison : Chest radiograph 2014.    Technique/Positioning: PA and lateral views of the chest.    Findings:    Support devices: None.    Cardiac/mediastinum/hilum: Unremarkable.    Lung parenchyma/Pleura: Within normal limits.    Skeleton/soft tissues: Unremarkable.    Impression:    No radiographic evidence of acute cardiopulmonary disease.    -TTE:  -CCTA:  EXAM:  CT ANGIO CHEST AORTA WAWIC        EXAM:  CT ANGIO ABD PELV (W)AW IC        PROCEDURE DATE:  2021    INTERPRETATION:  Clinical History / Reason for exam: Dissection    A CT angiogram of the chest, abdomen and pelvis was performed after the administration of 100 cc of Omnipaque 350 intravenous contrast. Initially, noncontrast images through the chest were obtained. No oral contrast was given. Sagittal, coronal and MIP reformats were performed.    Study is compared to prior chest CT dated 2014    No evidence of aortic dissection or aneurysm.    Right lower lobe 1.5 cm groundglass opacity. Follow-up noncontrast chest CT is recommended in 3 months.    Indeterminate pancreatic head hypodensity measuring 1.2 cm. Outpatient pancreatic MRI with gadolinium and MRCP imaging is recommended.    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    Likely right sided pericardial cyst    Indeterminate thyroid nodules  -STRESS TEST:  -CATHETERIZATION:  -OTHER:  EC Lead ECG:   Ventricular Rate 70 BPM    Atrial Rate 70 BPM    P-R Interval 160 ms    QRS Duration 94 ms    Q-T Interval 382 ms    QTC Calculation(Bazett) 412 ms    P Axis 35 degrees    R Axis 60 degrees    T Axis 175 degrees    Diagnosis Line Normal sinus rhythm  Marked T wave abnormality, consider anterolateral ischemia  Abnormal ECG    Confirmed by AMBIKA PAUL MD ([id number]) on 2021 9:02:46 AM ( @ 07:24)      TELEMETRY EVENTS:

## 2021-11-12 NOTE — ED PROVIDER NOTE - PROGRESS NOTE DETAILS
OG : Dissection study neg. Cardiology returned call - spoke to Dr. Beck. Reports she will send a resident to evaluate pt. og: Patient came in for chest pain/palpitations I Intend to get a bedside echo

## 2021-11-12 NOTE — CONSULT NOTE ADULT - ASSESSMENT
Summary:  Pt is a 70 y/o M w/ a PMHx of HTN, HLD, pre-DM, and gout presenting for chest pain. Interventional cardiology was consulted for possible CAD.     Impressions:  - likely unstable angina given ekg and CT findings along with pt hx and risk factors (HTN, HLD, pre-DM).   - HTN  - HLD  - pre-DM    Recommendations:  - send to cath lab  - monitor BP  - monitor glucose  Summary:  Pt is a 72 y/o M w/ a PMHx of HTN, HLD, pre-DM, and gout presenting for chest pain x 1d.     Impressions:  - CP/GUZMAN concerning for UA  - HTN, HLD, pre-DM (not on any medications at home), former smoker  ---  EKG: with dynamic ischemic changes anterior/anterolat terrotory  labs:   trops neg x 2,  scr 1.1.         Recommendations:  - npo for cardiac cath today  - sp asa load in ed  - start daily asa + lipitor 40  - continue home bb  - check lipids / a1c  - check tte  - pre and post cath ivf  - f/u post cath for further recs.   - covid sent/received, pending.  (but pt vaccinated and had recent outpt covid test neg yesterday) Summary:  Pt is a 72 y/o M w/ a PMHx of HTN, HLD, pre-DM, and gout presenting for chest pain x 1d.     Impressions:  - CP/GUZMAN concerning for UA  - HTN, HLD, pre-DM (not on any medications at home), former smoker  ---  EKG: with dynamic ischemic changes anterior/anterolat terrotory  labs:   trops neg x 2,  scr 1.1.         Recommendations:  - npo for cardiac cath today  - sp asa load in ed  - start daily asa + lipitor 40  - continue home bb  - check lipids / a1c  - check tte  - pre and post cath ivf  - f/u post cath for further recs.   - covid sent/received, pending.  (but pt vaccinated and had recent outpt covid test neg yesterday)

## 2021-11-12 NOTE — CHART NOTE - NSCHARTNOTEFT_GEN_A_CORE
Pre cath note:    Indication:                     [ ] STEMI                [ ] NSTEMI                 [ ] Acute coronary syndrome                   [ x] Unstable Angina   [ ] high risk  [ ] intermediate risk  [ ] low risk                   [ ] Stable Angina     non-invasive testing:                          Date:                     result: [ ] high risk  [ ] intermediate risk  [ ] low risk                   [ ] Other:     Anti- Anginal medications:                    [ ] not used                       [ x] used                   [ ] not used but strong indication not to use    Ejection Fraction                   [ ] <29            [ ] 30-39%   [ ] 40-49%     [ ]>50%    CHF                   [ ] active (within last 14 days on meds   [ ] Chronic (on meds but no exacerbation)    COPD                   [ ] mild (on chronic bronchodilators)  [ ] moderate (on chronic steroid therapy)      [ ] severe (indication for home O2 or PACO2 >50)    Other risk factors:                     [ ] Previous MI                     [ ] CVA/ stroke                    [ ] carotid stent/ CEA                    [ ] PVD/PAD- (arterial aneurysm, non-palpable pulses, tortuous vessel with inability to insert catheter, infra-renal dissection, renal or subclavian artery stenosis)                    [x ] diabetic not on any home meds ('pre dm' last told;  previously on metformin in past)                    [ ] previous CABG                    [ ] Renal Failure                           15.6   7.99  )-----------( 158      ( 12 Nov 2021 08:00 )             46.8     11-12    140  |  104  |  13  ----------------------------<  124<H>  5.2<H>   |  22  |  1.1    Ca    9.5      12 Nov 2021 08:00    TPro  7.3  /  Alb  4.4  /  TBili  0.7  /  DBili  x   /  AST  35  /  ALT  50<H>  /  AlkPhos  98  11-12                         -Patient Scheduled for LHC/PCI today  -Keep NPO   -Hold Anticoagulation prior to PCI  -Start IV Fluids NS at : 3ml/Kg for 1 Hr prior to Procedure                                          1ml/KG for 1 Hr prior to Procedure if patient has signs of Heart Failure                                             PREOPERATIVE DAY OF PROCEDURE EVALUATION:  I have personally seen and examined the patient.  I agree with the history and physical which I have reviewed and noted any changes below.  (Signed electronically by __________)  11-12-21 @ 14:24

## 2021-11-12 NOTE — ED PROCEDURE NOTE - ATTENDING CONTRIBUTION TO CARE
I was present for and supervised the key/critical aspects of the procedures performed during the care of the patient. I was present for and supervised the key/critical aspects of the procedures performed during the care of the patient.    I personally supervised the study.  I reviewed the images and interpretation by the resident/ACP and have edited where appropriate.

## 2021-11-12 NOTE — ED ADULT NURSE NOTE - OBJECTIVE STATEMENT
Patient present to ED with mid sternal Chest pain, radiating to right scapula area since this AM patient describes as intermittent aching sensation, patient did not try any remedies prior to arrival. triggering factors sitting up or on ambulation. denies fevers, chills, dizziness any N/V/D.

## 2021-11-12 NOTE — H&P ADULT - ATTENDING COMMENTS
Patient seen and examined. Pertinent labs, imaging and telemetry reviewed. I agree with the above with exceptions below:    Patient feeling well today. Denies any CP. S/P LHC which showed mild, non-obstructive disease. EKG changes of TWI in lateral and precordial leads still present but without CP and trops negative yesterday.   70 yo M with PMHx of  HTN, HLD, gout, and pre-DM presenting for chest pain.  CAD: Non-obstructive. Medical management.  -Continue on ASA 81mg PO daily and increase atorvastatin 80mg PO daily.   -Continue on Toprol 50mg PO daily.   -Check TTE.  -Follow up HA1c and TSH.   -Aggressive risk factor modification including diet and exercise.     HTN: Well controlled.  -Continue with Toprol 50mg PO daily.     HLD: LDL 91  Increase atorvastatin 80mg PO daily.     Prediabetes: HA1c pending.  -Risk factor modification.     Dispo: Pending TTE. Discharge home with outpatient follow up.

## 2021-11-12 NOTE — CHART NOTE - NSCHARTNOTEFT_GEN_A_CORE
PRE-OP DIAGNOSIS:  Unstable angina      PROCEDURE:     [X] Coronary Angiogram     [X] LHC     [] LVG     [] RHC     [] Intervention (see below)         PHYSICIAN:  Dr. Jerry    INTERVENTIONAL FELLOW: Dr. Bowens    FELLOW: Dr. Bergeron         PROCEDURE DESCRIPTION:     Consent:      [X] Patient     [] Family Member     []  Used        Anesthesia:     [] General     [X] Sedation     [] Local        Access & Closure:     [X] 6Fr Radial Artery --> D-STAT    [] Fr Femoral Artery     [] Fr Femoral Vein     [] Fr Brachial Vein       IV Contrast: 45 mL        Intervention: None      Implants: None       FINDINGS:     Coronary Dominance: Right    LM: No disease    LAD: Mild diffuse disease  Diag: mild disease    CX: Minor luminal irregularities  OM: Minor luminal irregularities    RCA: Large vessel; ectatic; Minor luminal irregularities       LVEDP: Normal     EF: 69% on echo from 4/4/2018        ESTIMATED BLOOD LOSS: < 10 mL        CONDITION:     [] Good     [X] Fair     [] Critical        SPECIMEN REMOVED: N/A       POST-OP DIAGNOSIS:      [X] Mild luminal irregularities       PLAN OF CARE:     [X] Return to In-patient bed     [X] Medications: continue aspirin 81mg daily and atorvastatin 40mg daily; continue beta-blockers and increase as tolerated by HR and BP    [X] IV Fluids: NS at 100cc/hr for 4 hours PRE-OP DIAGNOSIS:  Unstable angina      PROCEDURE:     [X] Coronary Angiogram     [X] LHC     [] LVG     [] RHC     [] Intervention (see below)         PHYSICIAN:  Dr. Jerry    INTERVENTIONAL FELLOW: Dr. Bowens    FELLOW: Dr. Bergeron         PROCEDURE DESCRIPTION:     Consent:      [X] Patient     [] Family Member     []  Used        Anesthesia:     [] General     [X] Sedation     [] Local        Access & Closure:     [X] 6Fr Radial Artery --> D-STAT    [] Fr Femoral Artery     [] Fr Femoral Vein     [] Fr Brachial Vein       IV Contrast: 45 mL        Intervention: None      Implants: None       FINDINGS:     Coronary Dominance: Right    LM: No disease    LAD: Mild diffuse disease  Diag: mild disease    CX: Minor luminal irregularities  OM: Minor luminal irregularities    RCA: Large vessel; ectatic; Minor luminal irregularities       LVEDP: Normal     EF: 69% on echo from 4/4/2018        ESTIMATED BLOOD LOSS: < 10 mL        CONDITION:     [] Good     [X] Fair     [] Critical        SPECIMEN REMOVED: N/A       POST-OP DIAGNOSIS:      [X] Mild luminal irregularities       PLAN OF CARE:     [X] Return to In-patient bed     [X] Medications: continue aspirin 81mg daily and atorvastatin 40mg daily; continue beta-blockers and increase as tolerated by HR and BP    [X] Can restart pharmacologic DVT prophylaxis 4 hours after removal of D-STAT if no bleeding    [X] IV Fluids: NS at 100cc/hr for 6 hours

## 2021-11-12 NOTE — ED PROVIDER NOTE - CLINICAL SUMMARY MEDICAL DECISION MAKING FREE TEXT BOX
pt here with angina. no dissection. new ekg chnages. admitted to tele and cardiology plans for cath. stable at rest.

## 2021-11-12 NOTE — ED ADULT NURSE NOTE - NSIMPLEMENTINTERV_GEN_ALL_ED
Implemented All Universal Safety Interventions:  [place] to call system. Call bell, personal items and telephone within reach. Instruct patient to call for assistance. Room bathroom lighting operational. Non-slip footwear when patient is off stretcher. Physically safe environment: no spills, clutter or unnecessary equipment. Stretcher in lowest position, wheels locked, appropriate side rails in place.

## 2021-11-12 NOTE — ED PROVIDER NOTE - OBJECTIVE STATEMENT
Pt is a 70 yo M w/ pmhx of DM, HLD, HTN presenting w/ chest pain since 2:30 am. pt describes chest pain started when he woke up from sleep to use the bathroom , he noted sharp, substernal chest pain that radiated down his L arm and towards his back. associated w/ GUZMAN . no n/v. no fevers or chills. no hx of cardiac stents or known CAD. pt had a stress test in 2018 which was normal .

## 2021-11-12 NOTE — H&P ADULT - NSHPPHYSICALEXAM_GEN_ALL_CORE
VITALS:   T(C): 36.3 (11-12-21 @ 13:46), Max: 36.3 (11-12-21 @ 07:22)  HR: 62 (11-12-21 @ 13:46) (62 - 68)  BP: 128/68 (11-12-21 @ 13:46) (128/68 - 133/68)  RR: 18 (11-12-21 @ 13:46) (18 - 20)  SpO2: 97% (11-12-21 @ 13:46) (97% - 98%)    GENERAL: NAD, lying in bed comfortably  HEAD:  Atraumatic, normocephalic  EYES: EOMI, PERRLA, conjunctiva and sclera clear  ENT: Moist mucous membranes  NECK: Supple, no JVD  HEART: Regular rate and rhythm, no murmurs, rubs, or gallops  LUNGS: Unlabored respirations.  Clear to auscultation bilaterally, no crackles, wheezing, or rhonchi  ABDOMEN: Soft, nontender, nondistended, +BS  EXTREMITIES: 2+ peripheral pulses bilaterally. No clubbing, cyanosis, or edema  NERVOUS SYSTEM:  A&Ox3, no focal deficits   SKIN: No rashes or lesions

## 2021-11-12 NOTE — H&P ADULT - NSHPREVIEWOFSYSTEMS_GEN_ALL_CORE
REVIEW OF SYSTEMS:    CONSTITUTIONAL: No weakness, fevers or chills  EYES/ENT: No visual changes;  No vertigo or throat pain   NECK: No pain or stiffness  RESPIRATORY: No cough, wheezing, hemoptysis; No shortness of breath  CARDIOVASCULAR: Midsternal chest pain  GASTROINTESTINAL: No abdominal or epigastric pain. No nausea, vomiting, or hematemesis; No diarrhea or constipation. No melena or hematochezia.  GENITOURINARY: No dysuria, frequency or hematuria  NEUROLOGICAL: dizziness w/ LUE numbness  SKIN: No itching, rashes

## 2021-11-12 NOTE — ED ADULT NURSE NOTE - NSICDXFAMILYHX_GEN_ALL_CORE_FT
FAMILY HISTORY:  Father  Still living? Unknown  Family history of pancreatic cancer, Age at diagnosis: Age Unknown    Mother  Still living? Unknown  Family history of breast cancer, Age at diagnosis: Age Unknown

## 2021-11-12 NOTE — ED PROVIDER NOTE - ATTENDING CONTRIBUTION TO CARE
70 yo M w/ pmhx of DM, HLD, HTN here with L sided chest pain and L arm numbness since this morning and R sided back sharp pain radiating to R lower abd/back. +GUZMAN. all new. on exam, nontoxc, vss  Gen: Alert, NAD  Skin: Warm, dry, intact  Head: NCAT  ENT: Mucous membranes moist  Neck: Supple  CV: RRR, normal S1, S2, no m/r/g; 2+ b/l radial and DP pulses and symmetric.   Resp: Non labored respirations, Lungs CTA b/l, no wheezes, rales, rhonchi  Abdomen: Soft, nondistended, nontender  Extremities: Moving all extremities, no edema  Neuro: No focal neuro deficits  Psych: Cooperative     ekg with new lateral TWI and ischemic changes. given radiation down back, CTA done and neg for dissection. cardiology consulted and plan for cath. admitted to medicine. asa given.

## 2021-11-13 ENCOUNTER — TRANSCRIPTION ENCOUNTER (OUTPATIENT)
Age: 71
End: 2021-11-13

## 2021-11-13 VITALS
RESPIRATION RATE: 18 BRPM | DIASTOLIC BLOOD PRESSURE: 65 MMHG | SYSTOLIC BLOOD PRESSURE: 131 MMHG | TEMPERATURE: 98 F | HEART RATE: 65 BPM

## 2021-11-13 LAB
A1C WITH ESTIMATED AVERAGE GLUCOSE RESULT: 6.3 % — HIGH (ref 4–5.6)
ALBUMIN SERPL ELPH-MCNC: 4.1 G/DL — SIGNIFICANT CHANGE UP (ref 3.5–5.2)
ALP SERPL-CCNC: 77 U/L — SIGNIFICANT CHANGE UP (ref 30–115)
ALT FLD-CCNC: 42 U/L — HIGH (ref 0–41)
ANION GAP SERPL CALC-SCNC: 13 MMOL/L — SIGNIFICANT CHANGE UP (ref 7–14)
AST SERPL-CCNC: 26 U/L — SIGNIFICANT CHANGE UP (ref 0–41)
BASOPHILS # BLD AUTO: 0.04 K/UL — SIGNIFICANT CHANGE UP (ref 0–0.2)
BASOPHILS NFR BLD AUTO: 0.6 % — SIGNIFICANT CHANGE UP (ref 0–1)
BILIRUB SERPL-MCNC: 1 MG/DL — SIGNIFICANT CHANGE UP (ref 0.2–1.2)
BUN SERPL-MCNC: 13 MG/DL — SIGNIFICANT CHANGE UP (ref 10–20)
CALCIUM SERPL-MCNC: 9.1 MG/DL — SIGNIFICANT CHANGE UP (ref 8.5–10.1)
CHLORIDE SERPL-SCNC: 106 MMOL/L — SIGNIFICANT CHANGE UP (ref 98–110)
CHOLEST SERPL-MCNC: 150 MG/DL — SIGNIFICANT CHANGE UP
CO2 SERPL-SCNC: 21 MMOL/L — SIGNIFICANT CHANGE UP (ref 17–32)
CREAT SERPL-MCNC: 1 MG/DL — SIGNIFICANT CHANGE UP (ref 0.7–1.5)
EOSINOPHIL # BLD AUTO: 0.25 K/UL — SIGNIFICANT CHANGE UP (ref 0–0.7)
EOSINOPHIL NFR BLD AUTO: 3.5 % — SIGNIFICANT CHANGE UP (ref 0–8)
ESTIMATED AVERAGE GLUCOSE: 134 MG/DL — HIGH (ref 68–114)
GLUCOSE SERPL-MCNC: 98 MG/DL — SIGNIFICANT CHANGE UP (ref 70–99)
HCT VFR BLD CALC: 45.3 % — SIGNIFICANT CHANGE UP (ref 42–52)
HCV AB S/CO SERPL IA: 0.04 COI — SIGNIFICANT CHANGE UP
HCV AB SERPL-IMP: SIGNIFICANT CHANGE UP
HDLC SERPL-MCNC: 44 MG/DL — SIGNIFICANT CHANGE UP
HGB BLD-MCNC: 15.2 G/DL — SIGNIFICANT CHANGE UP (ref 14–18)
IMM GRANULOCYTES NFR BLD AUTO: 0.3 % — SIGNIFICANT CHANGE UP (ref 0.1–0.3)
LIPID PNL WITH DIRECT LDL SERPL: 91 MG/DL — SIGNIFICANT CHANGE UP
LYMPHOCYTES # BLD AUTO: 2.06 K/UL — SIGNIFICANT CHANGE UP (ref 1.2–3.4)
LYMPHOCYTES # BLD AUTO: 29.1 % — SIGNIFICANT CHANGE UP (ref 20.5–51.1)
MAGNESIUM SERPL-MCNC: 2.2 MG/DL — SIGNIFICANT CHANGE UP (ref 1.8–2.4)
MCHC RBC-ENTMCNC: 30.8 PG — SIGNIFICANT CHANGE UP (ref 27–31)
MCHC RBC-ENTMCNC: 33.6 G/DL — SIGNIFICANT CHANGE UP (ref 32–37)
MCV RBC AUTO: 91.9 FL — SIGNIFICANT CHANGE UP (ref 80–94)
MONOCYTES # BLD AUTO: 0.71 K/UL — HIGH (ref 0.1–0.6)
MONOCYTES NFR BLD AUTO: 10 % — HIGH (ref 1.7–9.3)
NEUTROPHILS # BLD AUTO: 4 K/UL — SIGNIFICANT CHANGE UP (ref 1.4–6.5)
NEUTROPHILS NFR BLD AUTO: 56.5 % — SIGNIFICANT CHANGE UP (ref 42.2–75.2)
NON HDL CHOLESTEROL: 106 MG/DL — SIGNIFICANT CHANGE UP
NRBC # BLD: 0 /100 WBCS — SIGNIFICANT CHANGE UP (ref 0–0)
PLATELET # BLD AUTO: 147 K/UL — SIGNIFICANT CHANGE UP (ref 130–400)
POTASSIUM SERPL-MCNC: 4.4 MMOL/L — SIGNIFICANT CHANGE UP (ref 3.5–5)
POTASSIUM SERPL-SCNC: 4.4 MMOL/L — SIGNIFICANT CHANGE UP (ref 3.5–5)
PROT SERPL-MCNC: 6.7 G/DL — SIGNIFICANT CHANGE UP (ref 6–8)
RBC # BLD: 4.93 M/UL — SIGNIFICANT CHANGE UP (ref 4.7–6.1)
RBC # FLD: 13 % — SIGNIFICANT CHANGE UP (ref 11.5–14.5)
SODIUM SERPL-SCNC: 140 MMOL/L — SIGNIFICANT CHANGE UP (ref 135–146)
TRIGL SERPL-MCNC: 124 MG/DL — SIGNIFICANT CHANGE UP
TSH SERPL-MCNC: 1.28 UIU/ML — SIGNIFICANT CHANGE UP (ref 0.27–4.2)
URATE SERPL-MCNC: 7.7 MG/DL — SIGNIFICANT CHANGE UP (ref 3.4–8.8)
WBC # BLD: 7.08 K/UL — SIGNIFICANT CHANGE UP (ref 4.8–10.8)
WBC # FLD AUTO: 7.08 K/UL — SIGNIFICANT CHANGE UP (ref 4.8–10.8)

## 2021-11-13 PROCEDURE — 99238 HOSP IP/OBS DSCHRG MGMT 30/<: CPT | Mod: GC

## 2021-11-13 PROCEDURE — 93010 ELECTROCARDIOGRAM REPORT: CPT

## 2021-11-13 PROCEDURE — 93306 TTE W/DOPPLER COMPLETE: CPT | Mod: 26

## 2021-11-13 RX ORDER — SIMVASTATIN 20 MG/1
1 TABLET, FILM COATED ORAL
Qty: 0 | Refills: 0 | DISCHARGE

## 2021-11-13 RX ORDER — METOPROLOL TARTRATE 50 MG
1 TABLET ORAL
Qty: 30 | Refills: 0
Start: 2021-11-13 | End: 2021-12-12

## 2021-11-13 RX ORDER — ATORVASTATIN CALCIUM 80 MG/1
1 TABLET, FILM COATED ORAL
Qty: 30 | Refills: 0
Start: 2021-11-13 | End: 2021-12-12

## 2021-11-13 RX ORDER — ASPIRIN/CALCIUM CARB/MAGNESIUM 324 MG
1 TABLET ORAL
Qty: 30 | Refills: 0
Start: 2021-11-13 | End: 2021-12-12

## 2021-11-13 RX ORDER — METOPROLOL TARTRATE 50 MG
0 TABLET ORAL
Qty: 0 | Refills: 0 | DISCHARGE

## 2021-11-13 RX ADMIN — Medication 81 MILLIGRAM(S): at 12:07

## 2021-11-13 RX ADMIN — Medication 50 MILLIGRAM(S): at 05:21

## 2021-11-13 NOTE — DISCHARGE NOTE PROVIDER - CARE PROVIDER_API CALL
Terrell Heard (DO)  Internal Medicine  [address], [address] [place] [address]  Phone: [phone]  Fax: [phone]  Follow Up Time: 2 weeks   Terrell Heard  [address]  [address]  [place] [address]  Phone: [phone]  Fax: [phone]  Follow Up Time: 2 weeks    Landen Arrieta (DO)  Critical Care Medicine; Pulmonary Disease; Sleep Medicine  [address] [place] [address], [facility] [address]  [place] [address]  Phone: [phone]  Fax: [phone]  Follow Up Time: 1 month    Duran Callahan  Gastroenterology  [address] [place] [address]  Phone: [phone]  Fax: [phone]  Follow Up Time: 2 weeks

## 2021-11-13 NOTE — DISCHARGE NOTE PROVIDER - HOSPITAL COURSE
This is a case of a 70 y/o gentleman KTH HTN, DL, gout, and pre-DM admitted for chest pain with TWI on EKG with 2 sets of negative troponins: Unstable Angina      # Acute Coronary Syndrome - Unstable Angina  - No prior significant personal or family cardiac history  - ECG: NSR w/ TWI in anteriolateral leads  - 2 sets trop -ve  -   - CXR -ve  - CTA chest -ve for aortic dissection  - Loaded with 325mg of ASA in the ED  - Asa 81mg qD  - Lipittor 40 qD  - Metoprolol 50mg qD  - Planned for coronary angiography this PM  - Cath: non-obstructive CAD    # HTN - Controlled  - C/w metoprolol home dose    # DL  - take simvastatin 20mg qD at home  - switched to atorvastatin 40mg qD  - F/u AM A1c    # Pre-DM  - not on any medication  - A1c 6.1 in April 2018  - F/u A1c in AM  - Consider metformin on D/c    # Gout  - Not on any meds  - F/u AM uric acid    Diet: NPO for now  Activity: IAT  DVT Prophylaxis: Hold for now pending cath  GI Prophylaxis: not indicated  CHG Order  Code Status: Full  Disposition: cardiac cath          This is a case of a 72 y/o gentleman KTH HTN, DL, gout, and pre-DM admitted for chest pain with TWI on EKG with 2 sets of negative troponins: Unstable Angina      # Acute Coronary Syndrome - Unstable Angina  - No prior significant personal or family cardiac history  - ECG: NSR w/ TWI in anteriolateral leads  - 2 sets trop -ve  -   - CXR -ve  - CTA chest -ve for aortic dissection  - Loaded with 325mg of ASA in the ED  - Asa 81mg qD  - Lipittor 40 qD  - Metoprolol 50mg qD  - Planned for coronary angiography this PM  - Cath: non-obstructive CAD    # HTN - Controlled  - C/w metoprolol home dose    # DL  - take simvastatin 20mg qD at home  - switched to atorvastatin 40mg qD  - F/u AM A1c    # Pre-DM  - not on any medication  - A1c 6.1 in April 2018  - F/u A1c in AM  - Consider metformin on D/c    # Gout  - Not on any meds  - F/u AM uric acid    Diet: NPO for now  Activity: IAT  DVT Prophylaxis: Hold for now pending cath  GI Prophylaxis: not indicated  CHG Order  Code Status: Full  Disposition: cardiac cath         Attending Attestation:  Patient seen and examined. Pertinent labs, imaging and telemetry reviewed. I agree with the above with exceptions below:    Patient feeling well today. Denies any CP. S/P LHC which showed mild, non-obstructive disease. EKG changes of TWI in lateral and precordial leads still present but without CP and trops negative yesterday.   70 yo M with PMHx of  HTN, HLD, gout, and pre-DM presenting for chest pain.  CAD: Non-obstructive. Medical management.  -Continue on ASA 81mg PO daily and increase atorvastatin 80mg PO daily.   -Continue on Toprol 50mg PO daily.   -Check TTE.  -Follow up HA1c and TSH.   -Aggressive risk factor modification including diet and exercise.     HTN: Well controlled.  -Continue with Toprol 50mg PO daily.     HLD: LDL 91  Increase atorvastatin 80mg PO daily.     Prediabetes: HA1c pending.  -Risk factor modification.     CTA incidental findings: Right lower lobe 1.5 cm groundglass opacity. Follow-up noncontrast chest CT is recommended in 3 months.    Indeterminate pancreatic head hypodensity measuring 1.2 cm. Outpatient pancreatic MRI with gadolinium and MRCP imaging is recommended.    -Follow up with pulmonary and GI.     Dispo: Pending TTE. Discharge home with outpatient follow up.        This is a case of a 70 y/o gentleman KTH HTN, DL, gout, and pre-DM admitted for chest pain with TWI on EKG with 2 sets of negative troponins: Unstable Angina      # Acute Coronary Syndrome - Unstable Angina  - No prior significant personal or family cardiac history  - ECG: NSR w/ TWI in anteriolateral leads  - 2 sets trop -ve  -   - CXR -ve  - CTA chest -ve for aortic dissection  - Loaded with 325mg of ASA in the ED  - Asa 81mg qD  - Lipittor 40 qD  - Metoprolol 50mg qD  - Planned for coronary angiography this PM  - Cath: non-obstructive CAD    # HTN - Controlled  - C/w metoprolol home dose    # DL  - take simvastatin 20mg qD at home  - switched to atorvastatin 40mg qD  - F/u AM A1c    # Pre-DM  - not on any medication  - A1c 6.1 in April 2018  - F/u A1c in AM  - Consider metformin on D/c    # Gout  - Not on any meds  - F/u AM uric acid    # Incidental CT findings:  No evidence of aortic dissection or aneurysm.    Right lower lobe 1.5 cm groundglass opacity. Follow-up noncontrast chest CT is recommended in 3 months.    Indeterminate pancreatic head hypodensity measuring 1.2 cm. Outpatient pancreatic MRI with gadolinium and MRCP imaging is recommended.    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    Likely right sided pericardial cyst    Indeterminate thyroid nodules      Diet: NPO for now  Activity: IAT  DVT Prophylaxis: Hold for now pending cath  GI Prophylaxis: not indicated  CHG Order  Code Status: Full  Disposition: cardiac cath         Attending Attestation:  Patient seen and examined. Pertinent labs, imaging and telemetry reviewed. I agree with the above with exceptions below:    Patient feeling well today. Denies any CP. S/P LHC which showed mild, non-obstructive disease. EKG changes of TWI in lateral and precordial leads still present but without CP and trops negative yesterday.   70 yo M with PMHx of  HTN, HLD, gout, and pre-DM presenting for chest pain.  CAD: Non-obstructive. Medical management.  -Continue on ASA 81mg PO daily and increase atorvastatin 80mg PO daily.   -Continue on Toprol 50mg PO daily.   -Check TTE.  -Follow up HA1c and TSH.   -Aggressive risk factor modification including diet and exercise.     HTN: Well controlled.  -Continue with Toprol 50mg PO daily.     HLD: LDL 91  Increase atorvastatin 80mg PO daily.     Prediabetes: HA1c pending.  -Risk factor modification.     CTA incidental findings: Right lower lobe 1.5 cm groundglass opacity. Follow-up noncontrast chest CT is recommended in 3 months.    Indeterminate pancreatic head hypodensity measuring 1.2 cm. Outpatient pancreatic MRI with gadolinium and MRCP imaging is recommended.    -Follow up with pulmonary and GI.     Dispo: Pending TTE. Discharge home with outpatient follow up.

## 2021-11-13 NOTE — DISCHARGE NOTE PROVIDER - NSDCCPCAREPLAN_GEN_ALL_CORE_FT
PRINCIPAL DISCHARGE DIAGNOSIS  Diagnosis: Acute chest pain  Assessment and Plan of Treatment: You had chest pain that was radiating to your left arm which raised our suspicion for a coronary artery disease. A coronary angiogram was done to assess for coronary artery disease and it was negative. Your cardiac enzymes were also negative.  Please take your medications as indicated and follow up with the cardiologist in 2 weeks

## 2021-11-13 NOTE — DISCHARGE NOTE PROVIDER - NSDCMRMEDTOKEN_GEN_ALL_CORE_FT
Adult Aspirin Regimen 81 mg oral delayed release tablet: 1 tab(s) orally once a day  Lipitor 40 mg oral tablet: 1 tab(s) orally once a day (at bedtime)  metoprolol tartrate 50 mg oral tablet: orally once a day   Adult Aspirin Regimen 81 mg oral delayed release tablet: 1 tab(s) orally once a day  atorvastatin 80 mg oral tablet: 1 tab(s) orally once a day   metoprolol tartrate 50 mg oral tablet: orally once a day

## 2021-11-13 NOTE — DISCHARGE NOTE PROVIDER - PROVIDER TOKENS
PROVIDER:[TOKEN:[[id number]],FOLLOWUP:[2 weeks]] FREE:[LAST:[Félix],FIRST:[Terrell],PHONE:[phone]],FAX:[phone]],ADDRESS:[[address] [place] [address]],FOLLOWUP:[2 weeks]],PROVIDER:[TOKEN:[[id number]],FOLLOWUP:[1 month]],PROVIDER:[TOKEN:[[id number]],FOLLOWUP:[2 weeks]]

## 2021-11-13 NOTE — DISCHARGE NOTE PROVIDER - CARE PROVIDERS DIRECT ADDRESSES
,DirectAddress_Unknown ,DirectAddress_Unknown,DirectAddress_Unknown,[email or web address]

## 2021-11-13 NOTE — DISCHARGE NOTE NURSING/CASE MANAGEMENT/SOCIAL WORK - PATIENT PORTAL LINK FT
You can access the FollowMyHealth Patient Portal offered by [facility] by registering at the following website: [email or web address]. By joining [facility]’s FollowMyHealth portal, you will also be able to view your health information using other applications (apps) compatible with our system.

## 2021-11-15 RX ORDER — ATORVASTATIN CALCIUM 80 MG/1
1 TABLET, FILM COATED ORAL
Qty: 30 | Refills: 0
Start: 2021-11-15 | End: 2021-12-14

## 2021-11-18 DIAGNOSIS — R91.8 OTHER NONSPECIFIC ABNORMAL FINDING OF LUNG FIELD: ICD-10-CM

## 2021-11-18 DIAGNOSIS — E78.5 HYPERLIPIDEMIA, UNSPECIFIED: ICD-10-CM

## 2021-11-18 DIAGNOSIS — M10.9 GOUT, UNSPECIFIED: ICD-10-CM

## 2021-11-18 DIAGNOSIS — R93.3 ABNORMAL FINDINGS ON DIAGNOSTIC IMAGING OF OTHER PARTS OF DIGESTIVE TRACT: ICD-10-CM

## 2021-11-18 DIAGNOSIS — I10 ESSENTIAL (PRIMARY) HYPERTENSION: ICD-10-CM

## 2021-11-18 DIAGNOSIS — Z87.891 PERSONAL HISTORY OF NICOTINE DEPENDENCE: ICD-10-CM

## 2021-11-18 DIAGNOSIS — R07.9 CHEST PAIN, UNSPECIFIED: ICD-10-CM

## 2021-11-18 DIAGNOSIS — R73.03 PREDIABETES: ICD-10-CM

## 2021-11-18 DIAGNOSIS — I25.10 ATHEROSCLEROTIC HEART DISEASE OF NATIVE CORONARY ARTERY WITHOUT ANGINA PECTORIS: ICD-10-CM

## 2021-11-24 ENCOUNTER — APPOINTMENT (OUTPATIENT)
Dept: CARDIOLOGY | Facility: CLINIC | Age: 71
End: 2021-11-24
Payer: COMMERCIAL

## 2021-11-24 VITALS — HEIGHT: 68 IN | WEIGHT: 213 LBS | TEMPERATURE: 97.6 F | BODY MASS INDEX: 32.28 KG/M2

## 2021-11-24 VITALS — DIASTOLIC BLOOD PRESSURE: 72 MMHG | SYSTOLIC BLOOD PRESSURE: 132 MMHG

## 2021-11-24 VITALS — DIASTOLIC BLOOD PRESSURE: 80 MMHG | SYSTOLIC BLOOD PRESSURE: 140 MMHG | HEART RATE: 75 BPM

## 2021-11-24 PROCEDURE — 99213 OFFICE O/P EST LOW 20 MIN: CPT

## 2021-11-24 PROCEDURE — 93000 ELECTROCARDIOGRAM COMPLETE: CPT

## 2021-11-24 NOTE — REVIEW OF SYSTEMS
[Negative] : Heme/Lymph [SOB] : shortness of breath [Chest Discomfort] : chest discomfort [Joint Pain] : joint pain [Joint Swelling] : joint swelling

## 2021-11-24 NOTE — ASSESSMENT
[FreeTextEntry1] : CAD: Non-obstructive on cardiac cath. Possible allergy to ASA. \par -Continue atorvastatin 40mg PO daily and toprol 50mg PO daily. \par -Discontinue ASA use. \par -Initiate Plavix 75mg PO daily. \par -Will follow with interval stress testing. \par \par HTN: BP at goal per ACC/AHA 2018 guidelines\par -Continue with Toprol 50mg PO daily. \par -Referral to pulmonology for sleep study. \par \par HLD: , , HDL 44, LDL 91 (11/13/21)\par -Continue with atorvastatin 80mg PO daily. \par -Discussed therapeutic lifestyle changes to promote improved lipid metabolism\par -Will check labs at next visit. \par \par Prediabetes: HA1c 6.3 (11/13/21)\par -Discussed therapeutic lifestyle changes to promote improved insulin sensitivity. \par \par Gout: Current flair of right hand. \par -Initiate Prednisone 40mg PO daily for 2 weeks.\par -Follow up with PCP for prednisone continuation vs. taper. \par -Rheumatology referral given. \par \par GGO on CT: 1.3cm GGO in lung found on CT\par -Pulmonology referral. \par \par Pancreatic head hypodensity: Found incidentally on CT\par -Patient will follow with GI, Dr. Duane Dalal. \par \par Follow up in 3 months. \par \par

## 2021-11-24 NOTE — HISTORY OF PRESENT ILLNESS
[FreeTextEntry1] : Mr. Holcomb is a 72yo male with PMHx of non-obstructive CAD, HTN, HLD, prediabetes and gout who presents to establish care after hospitalization for chest pain. His PCP is Dr. Beckie Llily. He was admitted to [facility] on 11/12/21 for evaluation of chest pain. EKG with anterolateral TWI and negative troponins and continued chest pain. He went for CTA CAP which ruled out dissection. His EKG changes persisted and was sent for left heart cath for UA. The LHC showed non-obstructive CAD. He currently is having a gout attack in his right hand.

## 2021-11-24 NOTE — REASON FOR VISIT
[Other: ____] : [unfilled] [FreeTextEntry1] : Diagnostic Tests:\par --------------------\par EKG:\par 11/24/21-NSR. TWI in anterolateral leads. \par 11/12/21-NSR. TWI in anterolateral leads. \par --------------------\par Echo:\par 11/13/21-TTE: EF 60-65%. Mild AI, MR, TR. \par --------------------\par CT: \par 11/12/21-CTA CAP: No evidence of aortic dissection or aneurysm. RLL 1.5cm GGO. Pancreatic head hypodensity 1.2cm. 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 Thyroid nodules. \par --------------------\par Cath: \par 11/12/21-LHC: LAD mild, LCx minor, OM1 minor, RCA minor.

## 2022-02-17 LAB
ALBUMIN SERPL ELPH-MCNC: 4.5 G/DL
ALP BLD-CCNC: 72 U/L
ALT SERPL-CCNC: 54 U/L
ANION GAP SERPL CALC-SCNC: 17 MMOL/L
AST SERPL-CCNC: 41 U/L
BASOPHILS # BLD AUTO: 0.07 K/UL
BASOPHILS NFR BLD AUTO: 1 %
BILIRUB SERPL-MCNC: 0.8 MG/DL
BUN SERPL-MCNC: 13 MG/DL
CALCIUM SERPL-MCNC: 9.7 MG/DL
CHLORIDE SERPL-SCNC: 104 MMOL/L
CHOLEST SERPL-MCNC: 142 MG/DL
CO2 SERPL-SCNC: 20 MMOL/L
CREAT SERPL-MCNC: 1.2 MG/DL
EOSINOPHIL # BLD AUTO: 0.38 K/UL
EOSINOPHIL NFR BLD AUTO: 5.4 %
ESTIMATED AVERAGE GLUCOSE: 128 MG/DL
GLUCOSE SERPL-MCNC: 103 MG/DL
HBA1C MFR BLD HPLC: 6.1 %
HCT VFR BLD CALC: 46.8 %
HDLC SERPL-MCNC: 45 MG/DL
HGB BLD-MCNC: 15.4 G/DL
IMM GRANULOCYTES NFR BLD AUTO: 0.4 %
LDLC SERPL CALC-MCNC: 76 MG/DL
LYMPHOCYTES # BLD AUTO: 3.02 K/UL
LYMPHOCYTES NFR BLD AUTO: 43.1 %
MAN DIFF?: NORMAL
MCHC RBC-ENTMCNC: 31.6 PG
MCHC RBC-ENTMCNC: 32.9 G/DL
MCV RBC AUTO: 95.9 FL
MONOCYTES # BLD AUTO: 0.81 K/UL
MONOCYTES NFR BLD AUTO: 11.6 %
NEUTROPHILS # BLD AUTO: 2.7 K/UL
NEUTROPHILS NFR BLD AUTO: 38.5 %
NONHDLC SERPL-MCNC: 97 MG/DL
PLATELET # BLD AUTO: 141 K/UL
POTASSIUM SERPL-SCNC: 4.4 MMOL/L
PROT SERPL-MCNC: 7.2 G/DL
RBC # BLD: 4.88 M/UL
RBC # FLD: 13.7 %
SODIUM SERPL-SCNC: 141 MMOL/L
TRIGL SERPL-MCNC: 160 MG/DL
TSH SERPL-ACNC: 1.17 UIU/ML
WBC # FLD AUTO: 7.01 K/UL

## 2022-02-18 NOTE — PHYSICAL EXAM
[Well Developed] : well developed [Well Nourished] : well nourished [No Acute Distress] : no acute distress [Normal Conjunctiva] : normal conjunctiva [Normal Venous Pressure] : normal venous pressure [5th Left ICS - MCL] : palpated at the 5th LICS in the midclavicular line [Normal] : normal [No Precordial Heave] : no precordial heave was noted [Normal Rate] : normal [Rhythm Regular] : regular [Normal S1] : normal S1 [Normal S2] : normal S2 [No Murmur] : no murmurs heard [No Pitting Edema] : no pitting edema present [2+] : left 2+ [Clear Lung Fields] : clear lung fields [Good Air Entry] : good air entry [No Respiratory Distress] : no respiratory distress  [Soft] : abdomen soft [Non Tender] : non-tender [Normal Gait] : normal gait [No Edema] : no edema [No Varicosities] : no varicosities [No Rash] : no rash [Moves all extremities] : moves all extremities [No Focal Deficits] : no focal deficits [Alert and Oriented] : alert and oriented

## 2022-02-23 ENCOUNTER — APPOINTMENT (OUTPATIENT)
Dept: CARDIOLOGY | Facility: CLINIC | Age: 72
End: 2022-02-23
Payer: COMMERCIAL

## 2022-02-23 VITALS — DIASTOLIC BLOOD PRESSURE: 76 MMHG | HEART RATE: 72 BPM | SYSTOLIC BLOOD PRESSURE: 138 MMHG

## 2022-02-23 VITALS — WEIGHT: 212 LBS | TEMPERATURE: 97.2 F | HEIGHT: 68 IN | BODY MASS INDEX: 32.13 KG/M2

## 2022-02-23 VITALS — SYSTOLIC BLOOD PRESSURE: 140 MMHG | DIASTOLIC BLOOD PRESSURE: 62 MMHG

## 2022-02-23 PROCEDURE — 93000 ELECTROCARDIOGRAM COMPLETE: CPT

## 2022-02-23 PROCEDURE — 99212 OFFICE O/P EST SF 10 MIN: CPT

## 2022-02-23 RX ORDER — CLOPIDOGREL BISULFATE 75 MG/1
75 TABLET, FILM COATED ORAL DAILY
Qty: 30 | Refills: 3 | Status: DISCONTINUED | COMMUNITY
Start: 2021-11-24 | End: 2022-02-23

## 2022-02-23 RX ORDER — PREDNISONE 20 MG/1
20 TABLET ORAL DAILY
Qty: 28 | Refills: 0 | Status: DISCONTINUED | COMMUNITY
Start: 2021-11-24 | End: 2022-02-23

## 2022-02-23 NOTE — ASSESSMENT
[FreeTextEntry1] : CAD: Non-obstructive on cardiac cath. Possible allergy to ASA. \par -Continue atorvastatin 40mg PO daily and toprol 50mg PO daily. \par -Discontinue ASA use and plavix. Pt complains of itching to both. \par -Will follow with interval stress testing. \par \par HTN: BP near goal per ACC/AHA 2018 guidelines\par -Continue with Toprol 50mg PO daily. \par -Referral to pulmonology for sleep study. \par \par HLD: , , HDL 45, LDL 76 (02/17/22)/ , tot Chol 142, LDL 76\par -Continue with atorvastatin 40 mg PO daily. \par - Risk factors modification\par - Discussed therapeutic lifestyle changes to promote improved lipid metabolism\par \par \par Prediabetes: HA1c 6.1 (02/17/22)\par -Discussed therapeutic lifestyle changes to promote improved insulin sensitivity. \par \par Gout: \par - patient completed prednisone\par -Rheumatology referral given. \par - will continue colchicine 0.6 mg PO  \par - will repeat CMP to access liver function in 1 month. \par - patient is instructed on possible side effects\par \par GGO on CT: 1.3cm GGO in lung found on CT\par -Pulmonology referral. \par \par Pancreatic head hypodensity: Found incidentally on CT\par - MRI is scheduled for tomorrow\par \par Follow up in 3 months. \par \par

## 2022-02-23 NOTE — REVIEW OF SYSTEMS
[SOB] : shortness of breath [Chest Discomfort] : chest discomfort [Negative] : Heme/Lymph [Joint Pain] : no joint pain [Joint Swelling] : no joint swelling

## 2022-02-23 NOTE — REASON FOR VISIT
[Other: ____] : [unfilled] [FreeTextEntry1] : Diagnostic Tests:\par --------------------\par EKG:\par 02/23/22-NSR. TWI in anterolateral leads. \par 11/24/21-NSR. TWI in anterolateral leads. \par 11/12/21-NSR. TWI in anterolateral leads. \par --------------------\par Echo:\par 11/13/21-TTE: EF 60-65%. Mild AI, MR, TR. \par --------------------\par CT: \par 11/12/21-CTA CAP: No evidence of aortic dissection or aneurysm. RLL 1.5cm GGO. Pancreatic head hypodensity 1.2cm. 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 Thyroid nodules. \par --------------------\par Cath: \par 11/12/21-LHC: LAD mild, LCx minor, OM1 minor, RCA minor.

## 2022-02-23 NOTE — HISTORY OF PRESENT ILLNESS
[FreeTextEntry1] : Mr. Holcomb is a 72yo male with PMHx of non-obstructive CAD, HTN, HLD, prediabetes and gout who presents to establish care after hospitalization for chest pain. His PCP is Dr. Beckie Lilly. He was admitted to [facility] on 11/12/21 for evaluation of chest pain. EKG with anterolateral TWI and negative troponins and continued chest pain. He went for CTA CAP which ruled out dissection. His EKG changes persisted and was sent for left heart cath for UA. The LHC showed non-obstructive CAD. Patient denies chest pain, dizziness, hand's inflammation subsided. \par He will have MRI to access cyst of pancreas. Patient will see pulmnologist and rheumatology.

## 2022-04-13 NOTE — ED ADULT NURSE REASSESSMENT NOTE - RESPIRATORY ASSESSMENT
Quality 226: Preventive Care And Screening: Tobacco Use: Screening And Cessation Intervention: Patient screened for tobacco use and is an ex/non-smoker
Quality 130: Documentation Of Current Medications In The Medical Record: Current Medications Documented
Quality 431: Preventive Care And Screening: Unhealthy Alcohol Use - Screening: Patient not identified as an unhealthy alcohol user when screened for unhealthy alcohol use using a systematic screening method
Detail Level: Detailed
WDL

## 2022-06-20 NOTE — ED ADULT NURSE NOTE - NSFALLRSKUNASSIST_ED_ALL_ED
Telephone call to eryn- Patient states that he just spoke with [facility] and they do no have a Prior Authorization on file for [place]. Writer informed patient I will call number below and inform him of any updates via E-Advice. Patient verbalized understanding and has no further questions.     Telephone call to [facility]- They state that they can not do anything further because they don't deal with [employer]. Direct number was given to contact them- [phone].     Telephone call to number above- [employer] stated that there is no Authorization required as of now but per Medicare Guidelines, a new authorization needs to be filled out as of 7/1/22 at [place].     Patient informed via E-Advice.    no

## 2022-06-28 LAB
ALBUMIN SERPL ELPH-MCNC: 3.8 G/DL
ALP BLD-CCNC: 92 U/L
ALT SERPL-CCNC: 38 U/L
ANION GAP SERPL CALC-SCNC: 14 MMOL/L
AST SERPL-CCNC: 30 U/L
BILIRUB SERPL-MCNC: 0.6 MG/DL
BUN SERPL-MCNC: 13 MG/DL
CALCIUM SERPL-MCNC: 9.6 MG/DL
CHLORIDE SERPL-SCNC: 101 MMOL/L
CHOLEST SERPL-MCNC: 140 MG/DL
CK SERPL-CCNC: 241 U/L
CO2 SERPL-SCNC: 24 MMOL/L
CREAT SERPL-MCNC: 1.1 MG/DL
EGFR: 72 ML/MIN/1.73M2
GLUCOSE SERPL-MCNC: 100 MG/DL
HDLC SERPL-MCNC: 34 MG/DL
LDLC SERPL CALC-MCNC: 85 MG/DL
NONHDLC SERPL-MCNC: 106 MG/DL
POTASSIUM SERPL-SCNC: 4.9 MMOL/L
PROT SERPL-MCNC: 7.4 G/DL
SODIUM SERPL-SCNC: 139 MMOL/L
TRIGL SERPL-MCNC: 104 MG/DL
URATE SERPL-MCNC: 8.3 MG/DL

## 2022-07-07 ENCOUNTER — APPOINTMENT (OUTPATIENT)
Dept: CARDIOLOGY | Facility: CLINIC | Age: 72
End: 2022-07-07

## 2022-07-07 VITALS — DIASTOLIC BLOOD PRESSURE: 76 MMHG | SYSTOLIC BLOOD PRESSURE: 116 MMHG

## 2022-07-07 VITALS
HEART RATE: 65 BPM | HEIGHT: 68 IN | DIASTOLIC BLOOD PRESSURE: 68 MMHG | SYSTOLIC BLOOD PRESSURE: 146 MMHG | WEIGHT: 194 LBS | TEMPERATURE: 97.4 F | BODY MASS INDEX: 29.4 KG/M2

## 2022-07-07 PROCEDURE — 93000 ELECTROCARDIOGRAM COMPLETE: CPT

## 2022-07-07 PROCEDURE — 99213 OFFICE O/P EST LOW 20 MIN: CPT

## 2022-07-07 RX ORDER — METFORMIN HYDROCHLORIDE 500 MG/1
500 TABLET, COATED ORAL
Refills: 0 | Status: DISCONTINUED | COMMUNITY
End: 2022-07-07

## 2022-07-07 NOTE — ASSESSMENT
[FreeTextEntry1] : CAD: Non-obstructive on cardiac cath. Possible allergy to ASA. \par -Continue atorvastatin 40mg PO daily and toprol 50mg PO daily. \par -Discontinue ASA use and plavix. Pt complains of itching to both. \par -Will follow with interval stress testing. \par \par HTN: BP near goal per ACC/AHA 2018 guidelines\par -Continue with Toprol 50mg PO daily. \par -Referral to pulmonology for sleep study. \par \par HLD: \par -Continue with atorvastatin 40 mg PO daily. \par - will check CK in 2 weeks. Might be related to recurrent gout attack with concurrent use of atorvastatin and colchicine. \par -If still elevated, will hold statin. \par - Risk factors modification\par - Discussed therapeutic lifestyle changes to promote improved lipid metabolism\par \par \par Prediabetes: HA1c 6.1 (02/17/22)\par -Discussed therapeutic lifestyle changes to promote improved insulin sensitivity. \par \par Gout: patient is having a recurrent attack.\par - will start prednisone taper for 16 days\par -Rheumatology referral given. \par - will continue colchicine 0.6 mg PO  \par - Will repeat CK levels. \par - patient is instructed on possible side effects\par \par GGO on CT: 1.3cm GGO in lung found on CT\par -Pulmonology referral. \par \par Pancreatic head hypodensity: Found incidentally on CT\par - MRI was done, results are pending. \par \par Follow up in 3 months. \par \par

## 2022-07-07 NOTE — PHYSICAL EXAM
[Well Developed] : well developed [Well Nourished] : well nourished [No Acute Distress] : no acute distress [Normal Conjunctiva] : normal conjunctiva [Normal Venous Pressure] : normal venous pressure [5th Left ICS - MCL] : palpated at the 5th LICS in the midclavicular line [Normal] : normal [No Precordial Heave] : no precordial heave was noted [Normal Rate] : normal [Rhythm Regular] : regular [Normal S1] : normal S1 [Normal S2] : normal S2 [No Murmur] : no murmurs heard [No Pitting Edema] : no pitting edema present [2+] : left 2+ [Clear Lung Fields] : clear lung fields [Good Air Entry] : good air entry [No Respiratory Distress] : no respiratory distress  [Soft] : abdomen soft [Non Tender] : non-tender [Normal Gait] : normal gait [No Edema] : no edema [No Varicosities] : no varicosities [No Rash] : no rash [Moves all extremities] : moves all extremities [No Focal Deficits] : no focal deficits [Alert and Oriented] : alert and oriented [de-identified] : Swelling and warmth over left 1-2 MP joints with effusion and Left ankle with nonpitting edema and warmth.

## 2022-07-07 NOTE — REASON FOR VISIT
[Other: ____] : [unfilled] [FreeTextEntry1] : Diagnostic Tests:\par --------------------\par EKG:\par 07/07/22-NSR. TWI in anterolateral leads. \par 02/23/22-NSR. TWI in anterolateral leads. \par 11/24/21-NSR. TWI in anterolateral leads. \par 11/12/21-NSR. TWI in anterolateral leads. \par --------------------\par Echo:\par 11/13/21-TTE: EF 60-65%. Mild AI, MR, TR. \par --------------------\par CT: \par 11/12/21-CTA CAP: No evidence of aortic dissection or aneurysm. RLL 1.5cm GGO. Pancreatic head hypodensity 1.2cm. 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 Thyroid nodules. \par --------------------\par Cath: \par 11/12/21-LHC: LAD mild, LCx minor, OM1 minor, RCA minor.

## 2022-07-07 NOTE — HISTORY OF PRESENT ILLNESS
[FreeTextEntry1] : Mr. Holcomb is a 72yo male with PMHx of non-obstructive CAD, HTN, HLD, prediabetes and gout. His PCP is Dr. Beckie Lilly. He was admitted to [facility] on 11/12/21 for evaluation of chest pain where he was found to have EKG with anterolateral TWI and negative troponins  Cardiac work up (CTA,TTE,LHC) showed normal LV function and non obstructive CAD.  \par He will have MRI to access cyst of pancreas revealed on CTA. Patient will see pulmonologist  for incidentally found lung nodules on CTA and rheumatology.\par \par Most recent 06/2022: , Trig 104, HDL 34, LDL 85, . Patient is on Atorvastatin 40 mg PO\par Patient has recurrent gout attacks, currently in his right 1-2nd MP joints and left ankle. \par

## 2022-07-07 NOTE — REVIEW OF SYSTEMS
[SOB] : shortness of breath [Chest Discomfort] : chest discomfort [Negative] : Heme/Lymph [Joint Pain] : joint pain [Joint Swelling] : joint swelling [Joint Stiffness] : no joint stiffness [Myalgia] : no myalgia

## 2022-12-08 ENCOUNTER — APPOINTMENT (OUTPATIENT)
Dept: CARDIOLOGY | Facility: CLINIC | Age: 72
End: 2022-12-08

## 2022-12-08 VITALS — WEIGHT: 208 LBS | BODY MASS INDEX: 31.52 KG/M2 | TEMPERATURE: 97.6 F | HEIGHT: 68 IN

## 2022-12-08 VITALS — SYSTOLIC BLOOD PRESSURE: 130 MMHG | DIASTOLIC BLOOD PRESSURE: 72 MMHG | HEART RATE: 70 BPM

## 2022-12-08 DIAGNOSIS — Z00.00 ENCOUNTER FOR GENERAL ADULT MEDICAL EXAMINATION W/OUT ABNORMAL FINDINGS: ICD-10-CM

## 2022-12-08 PROCEDURE — 93000 ELECTROCARDIOGRAM COMPLETE: CPT

## 2022-12-08 PROCEDURE — 99213 OFFICE O/P EST LOW 20 MIN: CPT | Mod: 25

## 2022-12-08 NOTE — ASSESSMENT
[FreeTextEntry1] : CAD: Non-obstructive on cardiac cath. Possible allergy to ASA. \par -Continue atorvastatin 40mg PO daily and toprol 50mg PO daily. \par -Discontinue ASA use and plavix. Pt complains of itching to both. \par -Will follow with interval stress testing. \par \par HTN: BP near goal per ACC/AHA 2018 guidelines\par -Continue with Toprol 50mg PO daily. \par -Referral to pulmonology for sleep study. \par \par HLD: \par -Continue with atorvastatin 40 mg PO daily. \par - will check CK.  \par -If still elevated, will hold statin. \par - Risk factors modification\par - Discussed therapeutic lifestyle changes to promote improved lipid metabolism\par \par \par Prediabetes: HA1c 6.1 (02/17/22)\par -Discussed therapeutic lifestyle changes to promote improved insulin sensitivity. \par \par Gout: patient is having a recurrent attack.\par - will start prednisone taper for 16 days\par -Rheumatology referral given. \par - will continue colchicine 0.6 mg PO  \par - Will repeat CK levels. \par - patient is instructed on possible side effects\par \par GGO on CT: 1.3cm GGO in lung found on CT\par -Pulmonology referral. \par \par Pancreatic head hypodensity: Found incidentally on CT\par - MRI was done, results are pending. \par \par Follow up in 4 months. \par \par

## 2022-12-08 NOTE — REVIEW OF SYSTEMS
[SOB] : shortness of breath [Chest Discomfort] : chest discomfort [Joint Pain] : joint pain [Joint Swelling] : joint swelling [Negative] : Heme/Lymph [Joint Stiffness] : no joint stiffness [Myalgia] : no myalgia

## 2022-12-08 NOTE — REASON FOR VISIT
[Other: ____] : [unfilled] [FreeTextEntry1] : Diagnostic Tests:\par --------------------\par EKG:\par 12/08/22-NSR. TWI in anterolateral leads. \par 07/07/22-NSR. TWI in anterolateral leads. \par 02/23/22-NSR. TWI in anterolateral leads. \par 11/24/21-NSR. TWI in anterolateral leads. \par 11/12/21-NSR. TWI in anterolateral leads. \par --------------------\par Echo:\par 11/13/21-TTE: EF 60-65%. Mild AI, MR, TR. \par --------------------\par CT: \par 11/12/21-CTA CAP: No evidence of aortic dissection or aneurysm. RLL 1.5cm GGO. Pancreatic head hypodensity 1.2cm. 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 Thyroid nodules. \par --------------------\par Cath: \par 11/12/21-LHC: LAD mild, LCx minor, OM1 minor, RCA minor.

## 2022-12-08 NOTE — HISTORY OF PRESENT ILLNESS
[FreeTextEntry1] : Mr. Holcomb is a 72yo male with PMHx of non-obstructive CAD, HTN, HLD, prediabetes and gout. His PCP is Dr. Beckie Lilly. He was admitted to [facility] on 11/12/21 for evaluation of chest pain where he was found to have EKG with anterolateral TWI and negative troponins  Cardiac work up (CTA,TTE,LHC) showed normal LV function and non obstructive CAD.  \par He will have MRI to access cyst of pancreas revealed on CTA. Patient will see pulmonologist  for incidentally found lung nodules on CTA and rheumatology.\par \par -Last gout attack about 1 month ago. Has been feeling better. Per wife, his diet still is not great. Denies CP, SOB, palpitations. \par

## 2023-01-01 NOTE — ED ADULT NURSE NOTE - BEFAST SPEECH PHRASE
Yes

## 2023-01-05 ENCOUNTER — APPOINTMENT (OUTPATIENT)
Dept: PULMONOLOGY | Facility: CLINIC | Age: 73
End: 2023-01-05

## 2023-04-11 LAB
ALBUMIN SERPL ELPH-MCNC: 4.5 G/DL
ALP BLD-CCNC: 93 U/L
ALT SERPL-CCNC: 52 U/L
ANION GAP SERPL CALC-SCNC: 15 MMOL/L
AST SERPL-CCNC: 34 U/L
BASOPHILS # BLD AUTO: 0.06 K/UL
BASOPHILS NFR BLD AUTO: 0.9 %
BILIRUB SERPL-MCNC: 0.4 MG/DL
BUN SERPL-MCNC: 16 MG/DL
CALCIUM SERPL-MCNC: 9.7 MG/DL
CHLORIDE SERPL-SCNC: 105 MMOL/L
CHOLEST SERPL-MCNC: 220 MG/DL
CO2 SERPL-SCNC: 22 MMOL/L
CREAT SERPL-MCNC: 1.1 MG/DL
EGFR: 72 ML/MIN/1.73M2
EOSINOPHIL # BLD AUTO: 0.32 K/UL
EOSINOPHIL NFR BLD AUTO: 5 %
ESTIMATED AVERAGE GLUCOSE: 131 MG/DL
GLUCOSE SERPL-MCNC: 87 MG/DL
HBA1C MFR BLD HPLC: 6.2 %
HCT VFR BLD CALC: 47.8 %
HDLC SERPL-MCNC: 42 MG/DL
HGB BLD-MCNC: 15.7 G/DL
IMM GRANULOCYTES NFR BLD AUTO: 0.5 %
LDLC SERPL CALC-MCNC: 144 MG/DL
LYMPHOCYTES # BLD AUTO: 2.7 K/UL
LYMPHOCYTES NFR BLD AUTO: 41.9 %
MAN DIFF?: NORMAL
MCHC RBC-ENTMCNC: 32.1 PG
MCHC RBC-ENTMCNC: 32.8 G/DL
MCV RBC AUTO: 97.8 FL
MONOCYTES # BLD AUTO: 0.71 K/UL
MONOCYTES NFR BLD AUTO: 11 %
NEUTROPHILS # BLD AUTO: 2.63 K/UL
NEUTROPHILS NFR BLD AUTO: 40.7 %
NONHDLC SERPL-MCNC: 178 MG/DL
PLATELET # BLD AUTO: 147 K/UL
POTASSIUM SERPL-SCNC: 4.7 MMOL/L
PROT SERPL-MCNC: 7.1 G/DL
RBC # BLD: 4.89 M/UL
RBC # FLD: 13.3 %
SODIUM SERPL-SCNC: 142 MMOL/L
TRIGL SERPL-MCNC: 170 MG/DL
WBC # FLD AUTO: 6.45 K/UL

## 2023-04-12 PROBLEM — R73.03 PREDIABETES: Status: ACTIVE | Noted: 2021-11-19

## 2023-04-13 ENCOUNTER — APPOINTMENT (OUTPATIENT)
Dept: CARDIOLOGY | Facility: CLINIC | Age: 73
End: 2023-04-13
Payer: COMMERCIAL

## 2023-04-13 VITALS — DIASTOLIC BLOOD PRESSURE: 82 MMHG | SYSTOLIC BLOOD PRESSURE: 138 MMHG | HEART RATE: 64 BPM

## 2023-04-13 VITALS — BODY MASS INDEX: 31.98 KG/M2 | WEIGHT: 211 LBS | HEIGHT: 68 IN | TEMPERATURE: 97.6 F

## 2023-04-13 VITALS — DIASTOLIC BLOOD PRESSURE: 78 MMHG | SYSTOLIC BLOOD PRESSURE: 140 MMHG

## 2023-04-13 DIAGNOSIS — R73.03 PREDIABETES.: ICD-10-CM

## 2023-04-13 PROCEDURE — 93000 ELECTROCARDIOGRAM COMPLETE: CPT

## 2023-04-13 PROCEDURE — 99213 OFFICE O/P EST LOW 20 MIN: CPT | Mod: 25

## 2023-04-13 RX ORDER — COLCHICINE 0.6 MG/1
0.6 TABLET ORAL
Refills: 0 | Status: DISCONTINUED | COMMUNITY
End: 2023-04-13

## 2023-04-13 NOTE — ASSESSMENT
[FreeTextEntry1] : CAD: Non-obstructive on cardiac cath. Possible allergy to ASA. \par -Continue toprol 50mg PO daily. \par -Check CK and start rosuvastatin if level ok. \par -Discontinue ASA use and plavix. Pt complains of itching to both. \par -Will follow with interval stress testing. \par \par HTN: BP near goal per ACC/AHA 2018 guidelines\par -Continue with Toprol 50mg PO daily. \par -Referral to pulmonology for sleep study. \par -Will trial increase in exercise for better control. \par \par HLD: , , HDL 42,  (04/23, off statin)\par -Continue to hold statin. \par - will check CK.  \par -If back to baseline, will start on rosuvastatin. \par - Risk factors modification\par - Discussed therapeutic lifestyle changes to promote improved lipid metabolism\par \par Prediabetes: HA1c 6.1 (02/17/22)\par -Discussed therapeutic lifestyle changes to promote improved insulin sensitivity. \par \par Gout: patient is having a recurrent attack.\par - will start prednisone taper for 16 days\par -Rheumatology referral given. \par - will continue colchicine 0.6 mg PO  \par - Will repeat CK levels. \par - patient is instructed on possible side effects\par \par GGO on CT: 1.3cm GGO in lung found on CT\par -Pulmonology referral. \par \par Pancreatic head hypodensity: Found incidentally on CT\par - MRI was done, results are pending. \par \par Follow up in 4 months. \par \par

## 2023-04-13 NOTE — HISTORY OF PRESENT ILLNESS
[FreeTextEntry1] : Mr. Holcomb is a 70yo male with PMHx of non-obstructive CAD, HTN, HLD, prediabetes and gout. His PCP is Dr. Beckie Lilly. He was admitted to [facility] on 11/12/21 for evaluation of chest pain where he was found to have EKG with anterolateral TWI and negative troponins  Cardiac work up (CTA,TTE,LHC) showed normal LV function and non obstructive CAD.  \par He will have MRI to access cyst of pancreas revealed on CTA. Patient will see pulmonologist  for incidentally found lung nodules on CTA and rheumatology.\par \par -Last gout attack about 1 month ago. Has been feeling better. Per wife, his diet still is not great. Denies CP, SOB, palpitations. \par 04/13/23-Patient has been holding atorvastatin due to body aches. Repeat bloodwork shows elevated cholesterol, CK level not checked. He is planning on increasing biking for exercise.

## 2023-04-13 NOTE — REASON FOR VISIT
[Other: ____] : [unfilled] [FreeTextEntry1] : Diagnostic Tests:\par --------------------\par EKG:\par 04/13/23-NSR. LAE. TWI Anterolateral. \par 12/08/22-NSR. TWI in anterolateral leads. \par 07/07/22-NSR. TWI in anterolateral leads. \par 02/23/22-NSR. TWI in anterolateral leads. \par 11/24/21-NSR. TWI in anterolateral leads. \par 11/12/21-NSR. TWI in anterolateral leads. \par --------------------\par Echo:\par 11/13/21-TTE: EF 60-65%. Mild AI, MR, TR. \par --------------------\par CT: \par 11/12/21-CTA CAP: No evidence of aortic dissection or aneurysm. RLL 1.5cm GGO. Pancreatic head hypodensity 1.2cm. 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 Thyroid nodules. \par --------------------\par Cath: \par 11/12/21-LHC: LAD mild, LCx minor, OM1 minor, RCA minor.

## 2023-04-25 ENCOUNTER — NON-APPOINTMENT (OUTPATIENT)
Age: 73
End: 2023-04-25

## 2023-04-25 LAB — CK SERPL-CCNC: 347 U/L

## 2023-05-10 ENCOUNTER — INPATIENT (INPATIENT)
Facility: HOSPITAL | Age: 73
LOS: 0 days | Discharge: HOME | DRG: 309 | End: 2023-05-11
Attending: HOSPITALIST | Admitting: HOSPITALIST
Payer: COMMERCIAL

## 2023-05-10 VITALS
HEART RATE: 105 BPM | HEIGHT: 68 IN | TEMPERATURE: 98 F | OXYGEN SATURATION: 99 % | SYSTOLIC BLOOD PRESSURE: 157 MMHG | DIASTOLIC BLOOD PRESSURE: 86 MMHG | WEIGHT: 210.1 LBS | RESPIRATION RATE: 18 BRPM

## 2023-05-10 DIAGNOSIS — Z98.890 OTHER SPECIFIED POSTPROCEDURAL STATES: Chronic | ICD-10-CM

## 2023-05-10 DIAGNOSIS — R07.9 CHEST PAIN, UNSPECIFIED: ICD-10-CM

## 2023-05-10 LAB
ALBUMIN SERPL ELPH-MCNC: 4.1 G/DL — SIGNIFICANT CHANGE UP (ref 3.5–5.2)
ALP SERPL-CCNC: 77 U/L — SIGNIFICANT CHANGE UP (ref 30–115)
ALT FLD-CCNC: 41 U/L — SIGNIFICANT CHANGE UP (ref 0–41)
ANION GAP SERPL CALC-SCNC: 12 MMOL/L — SIGNIFICANT CHANGE UP (ref 7–14)
AST SERPL-CCNC: 39 U/L — SIGNIFICANT CHANGE UP (ref 0–41)
BASOPHILS # BLD AUTO: 0.09 K/UL — SIGNIFICANT CHANGE UP (ref 0–0.2)
BASOPHILS NFR BLD AUTO: 1 % — SIGNIFICANT CHANGE UP (ref 0–1)
BILIRUB SERPL-MCNC: 0.6 MG/DL — SIGNIFICANT CHANGE UP (ref 0.2–1.2)
BUN SERPL-MCNC: 17 MG/DL — SIGNIFICANT CHANGE UP (ref 10–20)
CALCIUM SERPL-MCNC: 9.6 MG/DL — SIGNIFICANT CHANGE UP (ref 8.4–10.5)
CHLORIDE SERPL-SCNC: 104 MMOL/L — SIGNIFICANT CHANGE UP (ref 98–110)
CO2 SERPL-SCNC: 22 MMOL/L — SIGNIFICANT CHANGE UP (ref 17–32)
CREAT SERPL-MCNC: 1 MG/DL — SIGNIFICANT CHANGE UP (ref 0.7–1.5)
D DIMER BLD IA.RAPID-MCNC: <150 NG/ML DDU — SIGNIFICANT CHANGE UP
EGFR: 80 ML/MIN/1.73M2 — SIGNIFICANT CHANGE UP
EOSINOPHIL # BLD AUTO: 0.24 K/UL — SIGNIFICANT CHANGE UP (ref 0–0.7)
EOSINOPHIL NFR BLD AUTO: 2.8 % — SIGNIFICANT CHANGE UP (ref 0–8)
GLUCOSE SERPL-MCNC: 87 MG/DL — SIGNIFICANT CHANGE UP (ref 70–99)
HCT VFR BLD CALC: 46.3 % — SIGNIFICANT CHANGE UP (ref 42–52)
HGB BLD-MCNC: 15.9 G/DL — SIGNIFICANT CHANGE UP (ref 14–18)
IMM GRANULOCYTES NFR BLD AUTO: 0.3 % — SIGNIFICANT CHANGE UP (ref 0.1–0.3)
LYMPHOCYTES # BLD AUTO: 3.28 K/UL — SIGNIFICANT CHANGE UP (ref 1.2–3.4)
LYMPHOCYTES # BLD AUTO: 37.8 % — SIGNIFICANT CHANGE UP (ref 20.5–51.1)
MAGNESIUM SERPL-MCNC: 2.3 MG/DL — SIGNIFICANT CHANGE UP (ref 1.8–2.4)
MCHC RBC-ENTMCNC: 31.7 PG — HIGH (ref 27–31)
MCHC RBC-ENTMCNC: 34.3 G/DL — SIGNIFICANT CHANGE UP (ref 32–37)
MCV RBC AUTO: 92.4 FL — SIGNIFICANT CHANGE UP (ref 80–94)
MONOCYTES # BLD AUTO: 0.89 K/UL — HIGH (ref 0.1–0.6)
MONOCYTES NFR BLD AUTO: 10.3 % — HIGH (ref 1.7–9.3)
NEUTROPHILS # BLD AUTO: 4.14 K/UL — SIGNIFICANT CHANGE UP (ref 1.4–6.5)
NEUTROPHILS NFR BLD AUTO: 47.8 % — SIGNIFICANT CHANGE UP (ref 42.2–75.2)
NRBC # BLD: 0 /100 WBCS — SIGNIFICANT CHANGE UP (ref 0–0)
NT-PROBNP SERPL-SCNC: 93 PG/ML — SIGNIFICANT CHANGE UP (ref 0–300)
PLATELET # BLD AUTO: 148 K/UL — SIGNIFICANT CHANGE UP (ref 130–400)
PMV BLD: 11.5 FL — HIGH (ref 7.4–10.4)
POTASSIUM SERPL-MCNC: 4.3 MMOL/L — SIGNIFICANT CHANGE UP (ref 3.5–5)
POTASSIUM SERPL-SCNC: 4.3 MMOL/L — SIGNIFICANT CHANGE UP (ref 3.5–5)
PROT SERPL-MCNC: 7 G/DL — SIGNIFICANT CHANGE UP (ref 6–8)
RBC # BLD: 5.01 M/UL — SIGNIFICANT CHANGE UP (ref 4.7–6.1)
RBC # FLD: 13 % — SIGNIFICANT CHANGE UP (ref 11.5–14.5)
SODIUM SERPL-SCNC: 138 MMOL/L — SIGNIFICANT CHANGE UP (ref 135–146)
TROPONIN T SERPL-MCNC: <0.01 NG/ML — SIGNIFICANT CHANGE UP
WBC # BLD: 8.67 K/UL — SIGNIFICANT CHANGE UP (ref 4.8–10.8)
WBC # FLD AUTO: 8.67 K/UL — SIGNIFICANT CHANGE UP (ref 4.8–10.8)

## 2023-05-10 PROCEDURE — 93010 ELECTROCARDIOGRAM REPORT: CPT | Mod: 77

## 2023-05-10 PROCEDURE — 71045 X-RAY EXAM CHEST 1 VIEW: CPT | Mod: 26

## 2023-05-10 PROCEDURE — 99285 EMERGENCY DEPT VISIT HI MDM: CPT

## 2023-05-10 NOTE — ED ADULT NURSE NOTE - OBJECTIVE STATEMENT
Pt c/o intermittent chest pain and SOB for approx 3 weeks. Not c/o pain at this time, but earlier today felt it. Has hx of anxiety and panic attacks.

## 2023-05-10 NOTE — ED ADULT NURSE NOTE - CAS EDN DISCHARGE ASSESSMENT
----- Message from Blossom Hernandez PA-C sent at 1/22/2022  8:34 AM CST -----  Urine is sl abn, no culture back yet, I will reorder the macrobid for her and will await the culture results if need to change the antibiotic. Erx sent to pharm.   Alert and oriented to person, place and time

## 2023-05-10 NOTE — ED PROVIDER NOTE - DIFFERENTIAL DIAGNOSIS
cardiac ischemia, cardiac arrhythmia, acute coronary syndromes, symptomatic anemia, electrolyte abnormalities including hyponatremia and hypokalemia, and pneumothorax. Pulmonary embolism and aortic dissection are less likely. Differential Diagnosis

## 2023-05-10 NOTE — ED ADULT TRIAGE NOTE - CHIEF COMPLAINT QUOTE
Panic attack, chest pain. I'm SOB, a little dizzy - patient  Patient reports symptoms intermittent. Patient denies any cardiac history, denies anticoagulants

## 2023-05-10 NOTE — ED ADULT NURSE NOTE - NSFALLUNIVINTERV_ED_ALL_ED
Bed/Stretcher in lowest position, wheels locked, appropriate side rails in place/Call bell, personal items and telephone in reach/Instruct patient to call for assistance before getting out of bed/chair/stretcher/Non-slip footwear applied when patient is off stretcher/[place] to call system/Physically safe environment - no spills, clutter or unnecessary equipment/Purposeful proactive rounding/Room/bathroom lighting operational, light cord in reach

## 2023-05-10 NOTE — ED PROVIDER NOTE - CLINICAL SUMMARY MEDICAL DECISION MAKING FREE TEXT BOX
EKG reviewed by me and shows: A. fib with RVR, non-specific T wave changes. No ST elevations.   CXR reviewed by me shows, no PTX, no pleural effusion, no free air.   Laboratory results reviewed and discussed with patient. CBC shows normal WBC count, Hb/Hct and platelet count are WNL. BMP shows electrolytes WNL and no MILAN.  Troponin is negative.  Patient remained hemodynamically stable during the course of ED stay. Discussed with patient about the results of the diagnostic studies. Discussed with admitting physician and MAR, patient is admitted to Medicine for further evaluation and care.

## 2023-05-10 NOTE — ED PROVIDER NOTE - ATTENDING APP SHARED VISIT CONTRIBUTION OF CARE
Patient is c/o intermittent episodes of sob/chest tightness associated with palpitations x one month. Patient thought he was having panic attacks, but today symptoms were severe and did not improve, so had come to ED for evaluation. Patient denies risk factors for PE/DVT.   Vitals reviewed.   EKG done in the triage showed: A. fib with RVR, which is new to patient, he has no h/o A. fib.   Repeat EKGs showed sinus rhythm.   Vitals reviewed.   Lungs: CTA, no wheezing, no crackles.  Abd: +BS, NT, ND, soft,   Ext: No E/E/C: Pulse+  no Calf tenderness.   CNS: awake, alert, o x 3, no focal neurologic deficits.  A/P: Chest tightness/sob/palpitations,   labs, EKG, CXR,   reevaluation.

## 2023-05-10 NOTE — ED PROVIDER NOTE - OBJECTIVE STATEMENT
73-year-old male past medical history hyperlipidemia, hypertension, previous–DM presenting to the ED for evaluation of "panic attack".  Patient reports he suffers from panic attacks 4 times per month, states he has not seen a psychiatrist, not on any medications for anxiety.  Patient reports intermittently develops chest tightness, shortness of breath, dizziness, palpitations.  Patient reports symptoms have resolved at this time however occur intermittently.  Denies fever, chills, headache, vision change, numbness/tingling, weakness, syncope. 73-year-old male past medical history hyperlipidemia, hypertension, pre–DM presenting to the ED for evaluation of "panic attack".  Patient reports he suffers from panic attacks 4 times per month, states he has not seen a psychiatrist, not on any medications for anxiety.  Patient reports intermittently develops chest tightness, shortness of breath, dizziness, palpitations.  Patient reports symptoms have resolved at this time however occur intermittently.  Denies fever, chills, headache, vision change, numbness/tingling, weakness, syncope.

## 2023-05-11 ENCOUNTER — TRANSCRIPTION ENCOUNTER (OUTPATIENT)
Age: 73
End: 2023-05-11

## 2023-05-11 VITALS
WEIGHT: 216.05 LBS | HEART RATE: 67 BPM | DIASTOLIC BLOOD PRESSURE: 82 MMHG | RESPIRATION RATE: 18 BRPM | HEIGHT: 68 IN | SYSTOLIC BLOOD PRESSURE: 157 MMHG | TEMPERATURE: 97 F

## 2023-05-11 DIAGNOSIS — R07.9 CHEST PAIN, UNSPECIFIED: ICD-10-CM

## 2023-05-11 LAB
A1C WITH ESTIMATED AVERAGE GLUCOSE RESULT: 6 % — HIGH (ref 4–5.6)
ALBUMIN SERPL ELPH-MCNC: 4 G/DL — SIGNIFICANT CHANGE UP (ref 3.5–5.2)
ALP SERPL-CCNC: 68 U/L — SIGNIFICANT CHANGE UP (ref 30–115)
ALT FLD-CCNC: 45 U/L — HIGH (ref 0–41)
ANION GAP SERPL CALC-SCNC: 12 MMOL/L — SIGNIFICANT CHANGE UP (ref 7–14)
AST SERPL-CCNC: 39 U/L — SIGNIFICANT CHANGE UP (ref 0–41)
BASOPHILS # BLD AUTO: 0.08 K/UL — SIGNIFICANT CHANGE UP (ref 0–0.2)
BASOPHILS NFR BLD AUTO: 1.1 % — HIGH (ref 0–1)
BILIRUB SERPL-MCNC: 1 MG/DL — SIGNIFICANT CHANGE UP (ref 0.2–1.2)
BUN SERPL-MCNC: 15 MG/DL — SIGNIFICANT CHANGE UP (ref 10–20)
CALCIUM SERPL-MCNC: 9.3 MG/DL — SIGNIFICANT CHANGE UP (ref 8.4–10.5)
CHLORIDE SERPL-SCNC: 103 MMOL/L — SIGNIFICANT CHANGE UP (ref 98–110)
CHOLEST SERPL-MCNC: 239 MG/DL — HIGH
CK SERPL-CCNC: 243 U/L — HIGH (ref 0–225)
CO2 SERPL-SCNC: 25 MMOL/L — SIGNIFICANT CHANGE UP (ref 17–32)
CREAT SERPL-MCNC: 1.1 MG/DL — SIGNIFICANT CHANGE UP (ref 0.7–1.5)
EGFR: 72 ML/MIN/1.73M2 — SIGNIFICANT CHANGE UP
EOSINOPHIL # BLD AUTO: 0.35 K/UL — SIGNIFICANT CHANGE UP (ref 0–0.7)
EOSINOPHIL NFR BLD AUTO: 4.9 % — SIGNIFICANT CHANGE UP (ref 0–8)
ESTIMATED AVERAGE GLUCOSE: 126 MG/DL — HIGH (ref 68–114)
FOLATE SERPL-MCNC: >20 NG/ML — SIGNIFICANT CHANGE UP
GLUCOSE SERPL-MCNC: 95 MG/DL — SIGNIFICANT CHANGE UP (ref 70–99)
HCT VFR BLD CALC: 46.2 % — SIGNIFICANT CHANGE UP (ref 42–52)
HDLC SERPL-MCNC: 41 MG/DL — SIGNIFICANT CHANGE UP
HGB BLD-MCNC: 16 G/DL — SIGNIFICANT CHANGE UP (ref 14–18)
IMM GRANULOCYTES NFR BLD AUTO: 0.3 % — SIGNIFICANT CHANGE UP (ref 0.1–0.3)
IRON SATN MFR SERPL: 132 UG/DL — SIGNIFICANT CHANGE UP (ref 35–150)
IRON SATN MFR SERPL: 46 % — SIGNIFICANT CHANGE UP (ref 15–50)
LIPID PNL WITH DIRECT LDL SERPL: 162 MG/DL — HIGH
LYMPHOCYTES # BLD AUTO: 2.45 K/UL — SIGNIFICANT CHANGE UP (ref 1.2–3.4)
LYMPHOCYTES # BLD AUTO: 34.5 % — SIGNIFICANT CHANGE UP (ref 20.5–51.1)
MAGNESIUM SERPL-MCNC: 2.2 MG/DL — SIGNIFICANT CHANGE UP (ref 1.8–2.4)
MCHC RBC-ENTMCNC: 32.7 PG — HIGH (ref 27–31)
MCHC RBC-ENTMCNC: 34.6 G/DL — SIGNIFICANT CHANGE UP (ref 32–37)
MCV RBC AUTO: 94.3 FL — HIGH (ref 80–94)
MONOCYTES # BLD AUTO: 0.82 K/UL — HIGH (ref 0.1–0.6)
MONOCYTES NFR BLD AUTO: 11.5 % — HIGH (ref 1.7–9.3)
NEUTROPHILS # BLD AUTO: 3.39 K/UL — SIGNIFICANT CHANGE UP (ref 1.4–6.5)
NEUTROPHILS NFR BLD AUTO: 47.7 % — SIGNIFICANT CHANGE UP (ref 42.2–75.2)
NON HDL CHOLESTEROL: 198 MG/DL — HIGH
NRBC # BLD: 0 /100 WBCS — SIGNIFICANT CHANGE UP (ref 0–0)
PHOSPHATE SERPL-MCNC: 2.9 MG/DL — SIGNIFICANT CHANGE UP (ref 2.1–4.9)
PLATELET # BLD AUTO: 149 K/UL — SIGNIFICANT CHANGE UP (ref 130–400)
PMV BLD: 11.1 FL — HIGH (ref 7.4–10.4)
POTASSIUM SERPL-MCNC: 4.4 MMOL/L — SIGNIFICANT CHANGE UP (ref 3.5–5)
POTASSIUM SERPL-SCNC: 4.4 MMOL/L — SIGNIFICANT CHANGE UP (ref 3.5–5)
PROT SERPL-MCNC: 6.7 G/DL — SIGNIFICANT CHANGE UP (ref 6–8)
RBC # BLD: 4.9 M/UL — SIGNIFICANT CHANGE UP (ref 4.7–6.1)
RBC # FLD: 13.2 % — SIGNIFICANT CHANGE UP (ref 11.5–14.5)
SODIUM SERPL-SCNC: 140 MMOL/L — SIGNIFICANT CHANGE UP (ref 135–146)
T3 SERPL-MCNC: 114 NG/DL — SIGNIFICANT CHANGE UP (ref 80–200)
T4 AB SER-ACNC: 7.1 UG/DL — SIGNIFICANT CHANGE UP (ref 4.6–12)
TIBC SERPL-MCNC: 288 UG/DL — SIGNIFICANT CHANGE UP (ref 220–430)
TRIGL SERPL-MCNC: 179 MG/DL — HIGH
TROPONIN T SERPL-MCNC: <0.01 NG/ML — SIGNIFICANT CHANGE UP
TSH SERPL-MCNC: 1.51 UIU/ML — SIGNIFICANT CHANGE UP (ref 0.27–4.2)
UIBC SERPL-MCNC: 156 UG/DL — SIGNIFICANT CHANGE UP (ref 110–370)
VIT B12 SERPL-MCNC: 496 PG/ML — SIGNIFICANT CHANGE UP (ref 232–1245)
WBC # BLD: 7.11 K/UL — SIGNIFICANT CHANGE UP (ref 4.8–10.8)
WBC # FLD AUTO: 7.11 K/UL — SIGNIFICANT CHANGE UP (ref 4.8–10.8)

## 2023-05-11 PROCEDURE — 80053 COMPREHEN METABOLIC PANEL: CPT

## 2023-05-11 PROCEDURE — 85025 COMPLETE CBC W/AUTO DIFF WBC: CPT

## 2023-05-11 PROCEDURE — 99233 SBSQ HOSP IP/OBS HIGH 50: CPT

## 2023-05-11 PROCEDURE — 83550 IRON BINDING TEST: CPT

## 2023-05-11 PROCEDURE — 82746 ASSAY OF FOLIC ACID SERUM: CPT

## 2023-05-11 PROCEDURE — 84436 ASSAY OF TOTAL THYROXINE: CPT

## 2023-05-11 PROCEDURE — 99223 1ST HOSP IP/OBS HIGH 75: CPT

## 2023-05-11 PROCEDURE — 83735 ASSAY OF MAGNESIUM: CPT

## 2023-05-11 PROCEDURE — 83036 HEMOGLOBIN GLYCOSYLATED A1C: CPT

## 2023-05-11 PROCEDURE — 83540 ASSAY OF IRON: CPT

## 2023-05-11 PROCEDURE — 82607 VITAMIN B-12: CPT

## 2023-05-11 PROCEDURE — 36415 COLL VENOUS BLD VENIPUNCTURE: CPT

## 2023-05-11 PROCEDURE — 84480 ASSAY TRIIODOTHYRONINE (T3): CPT

## 2023-05-11 PROCEDURE — 93010 ELECTROCARDIOGRAM REPORT: CPT

## 2023-05-11 PROCEDURE — 84443 ASSAY THYROID STIM HORMONE: CPT

## 2023-05-11 PROCEDURE — 80061 LIPID PANEL: CPT

## 2023-05-11 PROCEDURE — 84100 ASSAY OF PHOSPHORUS: CPT

## 2023-05-11 PROCEDURE — 99222 1ST HOSP IP/OBS MODERATE 55: CPT

## 2023-05-11 PROCEDURE — 93306 TTE W/DOPPLER COMPLETE: CPT

## 2023-05-11 PROCEDURE — 93306 TTE W/DOPPLER COMPLETE: CPT | Mod: 26

## 2023-05-11 PROCEDURE — 82550 ASSAY OF CK (CPK): CPT

## 2023-05-11 RX ORDER — LATANOPROST 0.05 MG/ML
1 SOLUTION/ DROPS OPHTHALMIC; TOPICAL AT BEDTIME
Refills: 0 | Status: DISCONTINUED | OUTPATIENT
Start: 2023-05-11 | End: 2023-05-11

## 2023-05-11 RX ORDER — METOPROLOL TARTRATE 50 MG
50 TABLET ORAL DAILY
Refills: 0 | Status: DISCONTINUED | OUTPATIENT
Start: 2023-05-11 | End: 2023-05-11

## 2023-05-11 RX ORDER — ENOXAPARIN SODIUM 100 MG/ML
95 INJECTION SUBCUTANEOUS EVERY 12 HOURS
Refills: 0 | Status: DISCONTINUED | OUTPATIENT
Start: 2023-05-11 | End: 2023-05-11

## 2023-05-11 RX ORDER — BRIMONIDINE TARTRATE 2 MG/MG
1 SOLUTION/ DROPS OPHTHALMIC DAILY
Refills: 0 | Status: DISCONTINUED | OUTPATIENT
Start: 2023-05-11 | End: 2023-05-11

## 2023-05-11 RX ORDER — BRIMONIDINE TARTRATE 2 MG/MG
1 SOLUTION/ DROPS OPHTHALMIC
Qty: 0 | Refills: 0 | DISCHARGE
Start: 2023-05-11

## 2023-05-11 RX ORDER — TIMOLOL 0.5 %
1 DROPS OPHTHALMIC (EYE)
Refills: 0 | Status: DISCONTINUED | OUTPATIENT
Start: 2023-05-11 | End: 2023-05-11

## 2023-05-11 RX ORDER — LATANOPROST 0.05 MG/ML
1 SOLUTION/ DROPS OPHTHALMIC; TOPICAL
Qty: 0 | Refills: 0 | DISCHARGE
Start: 2023-05-11

## 2023-05-11 RX ORDER — DORZOLAMIDE HYDROCHLORIDE 20 MG/ML
1 SOLUTION/ DROPS OPHTHALMIC THREE TIMES A DAY
Refills: 0 | Status: DISCONTINUED | OUTPATIENT
Start: 2023-05-11 | End: 2023-05-11

## 2023-05-11 RX ORDER — TIMOLOL 0.5 %
1 DROPS OPHTHALMIC (EYE)
Qty: 0 | Refills: 0 | DISCHARGE
Start: 2023-05-11

## 2023-05-11 RX ORDER — LANOLIN ALCOHOL/MO/W.PET/CERES
5 CREAM (GRAM) TOPICAL AT BEDTIME
Refills: 0 | Status: DISCONTINUED | OUTPATIENT
Start: 2023-05-11 | End: 2023-05-11

## 2023-05-11 RX ORDER — OMEGA-3 ACID ETHYL ESTERS 1 G
2 CAPSULE ORAL
Refills: 0 | Status: DISCONTINUED | OUTPATIENT
Start: 2023-05-11 | End: 2023-05-11

## 2023-05-11 RX ORDER — ASPIRIN/CALCIUM CARB/MAGNESIUM 324 MG
81 TABLET ORAL DAILY
Refills: 0 | Status: DISCONTINUED | OUTPATIENT
Start: 2023-05-11 | End: 2023-05-11

## 2023-05-11 RX ORDER — METOPROLOL TARTRATE 50 MG
25 TABLET ORAL
Refills: 0 | Status: DISCONTINUED | OUTPATIENT
Start: 2023-05-11 | End: 2023-05-11

## 2023-05-11 RX ORDER — APIXABAN 2.5 MG/1
5 TABLET, FILM COATED ORAL EVERY 12 HOURS
Refills: 0 | Status: DISCONTINUED | OUTPATIENT
Start: 2023-05-11 | End: 2023-05-11

## 2023-05-11 RX ORDER — APIXABAN 2.5 MG/1
1 TABLET, FILM COATED ORAL
Qty: 60 | Refills: 0
Start: 2023-05-11 | End: 2023-06-09

## 2023-05-11 RX ORDER — DORZOLAMIDE HYDROCHLORIDE 20 MG/ML
1 SOLUTION/ DROPS OPHTHALMIC
Qty: 0 | Refills: 0 | DISCHARGE
Start: 2023-05-11

## 2023-05-11 RX ORDER — ACETAMINOPHEN 500 MG
650 TABLET ORAL EVERY 6 HOURS
Refills: 0 | Status: DISCONTINUED | OUTPATIENT
Start: 2023-05-11 | End: 2023-05-11

## 2023-05-11 RX ORDER — COLCHICINE 0.6 MG
0.6 TABLET ORAL DAILY
Refills: 0 | Status: DISCONTINUED | OUTPATIENT
Start: 2023-05-11 | End: 2023-05-11

## 2023-05-11 RX ORDER — OMEGA-3 ACID ETHYL ESTERS 1 G
2 CAPSULE ORAL
Qty: 0 | Refills: 0 | DISCHARGE
Start: 2023-05-11

## 2023-05-11 RX ORDER — ATORVASTATIN CALCIUM 80 MG/1
80 TABLET, FILM COATED ORAL AT BEDTIME
Refills: 0 | Status: DISCONTINUED | OUTPATIENT
Start: 2023-05-11 | End: 2023-05-11

## 2023-05-11 RX ORDER — ALLOPURINOL 300 MG
100 TABLET ORAL DAILY
Refills: 0 | Status: DISCONTINUED | OUTPATIENT
Start: 2023-05-11 | End: 2023-05-11

## 2023-05-11 RX ORDER — METOPROLOL TARTRATE 50 MG
1 TABLET ORAL
Qty: 60 | Refills: 0
Start: 2023-05-11 | End: 2023-06-09

## 2023-05-11 RX ADMIN — Medication 25 MILLIGRAM(S): at 18:36

## 2023-05-11 RX ADMIN — DORZOLAMIDE HYDROCHLORIDE 1 DROP(S): 20 SOLUTION/ DROPS OPHTHALMIC at 06:46

## 2023-05-11 RX ADMIN — APIXABAN 5 MILLIGRAM(S): 2.5 TABLET, FILM COATED ORAL at 18:36

## 2023-05-11 RX ADMIN — ENOXAPARIN SODIUM 95 MILLIGRAM(S): 100 INJECTION SUBCUTANEOUS at 06:46

## 2023-05-11 RX ADMIN — Medication 25 MILLIGRAM(S): at 06:44

## 2023-05-11 RX ADMIN — Medication 1 DROP(S): at 06:46

## 2023-05-11 RX ADMIN — DORZOLAMIDE HYDROCHLORIDE 1 DROP(S): 20 SOLUTION/ DROPS OPHTHALMIC at 14:29

## 2023-05-11 RX ADMIN — BRIMONIDINE TARTRATE 1 DROP(S): 2 SOLUTION/ DROPS OPHTHALMIC at 11:53

## 2023-05-11 NOTE — CONSULT NOTE ADULT - ASSESSMENT
73 yr old male with hx of HTN, HLD, Gout presents for evaluation intermittent chest tightness/palpitations, sob, lightheadedness and dizziness for 1 week ago.     # Paroxysmal New onset A.fib RVR - spon converted to NSR   # HTN/HLD/ Pre-DM  # Gout   - asymptomic on enconter  - rate controlled   - CHADs VASc score 2  - EKG on admission --> A.fib with HR ~ 119  - Repeat EKG: Sinus jono  - echo (2021): EF 60%, with normal LVF   - echo pending   - c/w  PO lopressor 25mg BID   - c/w eliquis   - echo penidng  - check TSH, FT4  - tele monitoring  73 yr old male with hx of HTN, HLD, Gout presents for evaluation intermittent chest tightness/palpitations, sob, lightheadedness and dizziness for 1 week ago.     # Paroxysmal New onset A.fib RVR - spon converted to NSR   # HTN / HLD / Pre-DM  # Gout   - asymptotic on encounter  - rate controlled   - CHADs VASc score 2  - TSH 1.5  - EKG on admission --> A.fib with HR ~ 119  - Repeat EKG: Sinus jono  - echo (2021): EF 60%, with normal LVF   - echo pending   - c/w  PO lopressor 25mg BID   - c/w Eliquis   - echo pending  - tele monitoring  73 yr old male with hx of HTN, HLD, Gout presents for evaluation intermittent chest tightness/palpitations, sob, lightheadedness and dizziness for 1 week ago.     # Paroxysmal New onset A.fib RVR - spon converted to NSR   # HTN / HLD / Pre-DM  # Gout   - asymptotic on encounter  - rate controlled   - CHADs VASc score 2  - TSH 1.5  - EKG on admission --> A.fib with HR ~ 119  - Repeat EKG: Sinus jono  - ECHO (05/11/23): EF of 58%, Normal global left ventricular systolic function.- echo pending   - c/w  PO lopressor 25mg BID   - c/w Eliquis   - no further cardiac workup at this time   - EP follow up   - f/u with Dr. Guerra in 2-4 weeks   - tele monitoring

## 2023-05-11 NOTE — DISCHARGE NOTE PROVIDER - CARE PROVIDER_API CALL
JOLLY RICO  Internal Medicine  [address] [place] [address]  Phone: [phone]  Fax: [phone]  Follow Up Time: 2 weeks    Terrell Heard (DO)  Cardiovascular Disease; Internal Medicine  [address] [place] [address]  Phone: [phone]  Fax: [phone]  Follow Up Time: 1 week   JOLLY RICO  Internal Medicine  [address] [place] [address]  Phone: ([phone]  Fax: [phone]  Follow Up Time: 2 weeks    Terrell Heard ()  Cardiovascular Disease; Internal Medicine  [address] [place] [address]  Phone: [phone]  Fax: [phone]  Follow Up Time: 1 week    Edward Fonseca)  Cardiac Electrophysiology; Cardiovascular Disease; [facility]  [address] [place] [address]  Phone: [phone]  Fax: [phone]  Follow Up Time: 1 month

## 2023-05-11 NOTE — DISCHARGE NOTE PROVIDER - HOSPITAL COURSE
72 yo M w h/o HTN, HLD, Gout, self-diagnosed panic attacks presents for evaluation of a new panic attack. Patient reports he has been having panic attacks since 2019, initially 1-2 times a month and more recently have been occurring more frequently, ~4 times a month. Patient confirms that he has never been formally diagnosed with these attacks or taking any medications for them. Patient described the attacks as chest tightness/palpitations, sob, lightheadedness and dizziness. At time of exam patient asymptomatic but says its only because he is resting.     In ED, T 97.6, /86, , RR 18, Spo2 99% on RA; Labs unremarkable, including trop x2. EKG showed Afib w RVR which is new.     Patient admitted for further evaluation.     pt has new onset paroxysmal afib. ekg showed afib rvr. converted back to sinus overnight, rate controlled 60s. was started on lovenox and will need to be switched to eliquis on discharge. echo was obtained showing ________    patient is medically stable for discharge, can follow up with his cardiologist outpatient 72 yo M w h/o HTN, HLD, Gout, self-diagnosed panic attacks presents for evaluation of a new panic attack. Patient reports he has been having panic attacks since 2019, initially 1-2 times a month and more recently have been occurring more frequently, ~4 times a month. Patient confirms that he has never been formally diagnosed with these attacks or taking any medications for them. Patient described the attacks as chest tightness/palpitations, sob, lightheadedness and dizziness. At time of exam patient asymptomatic but says its only because he is resting.     In ED, T 97.6, /86, , RR 18, Spo2 99% on RA; Labs unremarkable, including trop x2. EKG showed Afib w RVR which is new.     Patient admitted for further evaluation.     pt has new onset paroxysmal afib. ekg showed afib rvr. converted back to sinus overnight, rate controlled 60s. was started on lovenox and will need to be switched to eliquis on discharge. echo was obtained showing ________    Pt took atorvastatin but being held for elevated CK. CK on admission 243. Will f/u with cardio op for resumption of statin    patient is medically stable for discharge, can follow up with his cardiologist outpatient

## 2023-05-11 NOTE — H&P ADULT - ASSESSMENT
74 yo M w h/o HTN, HLD, Gout, Panic attacks presents for evaluation of panic attack and found to be in afib w rvr on EKG.     #New Onset Afib - No sepsis on admission  - pt presented for what he thought was a panic attack and found to have afib w rvr on ekg  - cont home bb and start therapeutic lovenox for now - consider doac on d/c  - check echo and f/u pending labs including thyroid studies  - EP eval for possible ablation     #Nonobstructive CAD  - last had cath 11/2021  #HTN  #HLD: , , HDL 42,  (04/23, off statin) - pt off statin d/t statin-induced myopathy  - cont home toprol 50mg PO daily  - recent CK (4/24/2023) 347 - hold off on starting statin for now (outpatient cardio note says if CK back to baseline start rosuvastatin  - no aspirin and plavix as well, per cardio, d/t suspected allergy (pt complained of itching)  - cardio c/s after EP eval (follows w Dr. Heard)    #Gout   - completed a steroid taper last month  - cont colchicine - apparently has allergy to allopurinol   - outpatient rheum f/u     #Prediabetes: HA1c 6.1 (02/17/22)  - Diet and lifestyle modifications    #Panic Attack?  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     #New Onset Afib - No sepsis on admission  - pt presented for what he thought was a panic attack and found to have afib w rvr on ekg  - cont home bb and start therapeutic lovenox for now - consider doac on d/c  - check echo and f/u pending labs including thyroid studies  - EP eval for possible ablation     #Nonobstructive CAD  - last had cath 11/2021  #HTN  #HLD: , , HDL 42,  (04/23, off statin) - pt off statin d/t statin-induced myopathy  - cont home toprol 50mg PO daily  - recent CK (4/24/2023) 347 - hold off on starting statin for now (outpatient cardio note says if CK back to baseline start rosuvastatin  - no aspirin and plavix as well, per cardio, d/t suspected allergy (pt complained of itching)  - cardio c/s after EP eval (follows w Dr. Heard)    #Gout   - completed a steroid taper last month  - cont colchicine - apparently has allergy to allopurinol   - outpatient rheum f/u     #Prediabetes: HA1c 6.1 (02/17/22)  #Obesity (BMI 32)  - Diet and lifestyle modifications    #Panic Attack?  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     #New Onset Afib - No sepsis on admission  - pt presented for what he thought was a panic attack and found to have afib w rvr on ekg  - cont bb and start therapeutic lovenox for now - consider doac on d/c  - check echo and f/u pending labs including thyroid studies  - EP eval for possible ablation     #Nonobstructive CAD  - last had cath 11/2021  #HTN  #HLD: , , HDL 42,  (04/23, off statin) - pt off statin d/t statin-induced myopathy  - starting lopressor 25 bid instead of home toprol 50  - recent CK (4/24/2023) 347 - hold off on starting statin for now (outpatient cardio note says if CK back to baseline start rosuvastatin  - no aspirin and plavix as well, per cardio, d/t suspected allergy (pt complained of itching)  - cardio c/s after EP eval (follows w Dr. Heard)    #Gout   - completed a steroid taper last month  - cont colchicine - apparently has allergy to allopurinol   - outpatient rheum f/u     #Prediabetes: HA1c 6.1 (02/17/22)  #Obesity (BMI 32)  - Diet and lifestyle modifications    #Panic Attack?  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     #New Onset Afib - No sepsis on admission  - pt presented for what he thought was a panic attack and found to have afib w rvr on ekg  - cont bb and start therapeutic lovenox for now - consider doac on d/c  - check echo and f/u pending labs including thyroid studies  - EP eval for possible ablation     #Nonobstructive CAD  - last had cath 11/2021  #HTN  #HLD: , , HDL 42,  (04/23, off statin) - pt off statin d/t statin-induced myopathy  - starting lopressor 25 bid instead of home toprol 50  - recent CK (4/24/2023) 347 - hold off on starting statin for now (outpatient cardio note says if CK back to baseline start rosuvastatin  - no aspirin and plavix as well, per cardio, d/t suspected allergy (pt complained of itching)  - cardio c/s after EP eval (follows w Dr. Heard)    #Gout   - completed a steroid taper last month  - cont colchicine - apparently has allergy to allopurinol   - outpatient rheum f/u     #Prediabetes: HA1c 6.1 (02/17/22)  #Obesity (BMI 32)  - Diet and lifestyle modifications    #Self-Diagnosed Panic Attacks  - patient presented for evaluation of what he thought was a panic attack - endorses having "attacks" since 2019, occurring more frequently as of late (though likely experiencing afib rather then panic attack)  - outpatient Behavior [facility] eval if attacks persist despite afib workup and management    #Lung opacity  - CT from Nov 2021 showed RLL 1.5 cm groundglass opacity  - outpatient pulm follow up    #Pancreatic head hypodensity  - CT 11/2021 showed indeterminate pancreatic head hypodensity measuring 1.2 cm along w 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

## 2023-05-11 NOTE — CONSULT NOTE ADULT - ASSESSMENT
Cards: Félix    72 yo M w h/o HTN, HLD, Gout, self-diagnosed panic attacks presents for evaluation of a new panic attack. Found to be in AF  bpm.     Impression:  New onset AF RVR, currently NSR 58 bpm; CHADSVASC 2  Hx HTN HLD    Plan:  - [facility] TSH  - Echo  - Eliquis 5 mg Po Q 12 for stroke prevention  - Cont lopressor 25 mg PO Q 12  - Tele while inpatient  - Outpatient fu to discuss AF ablation further in 1 month    EPS [id number]

## 2023-05-11 NOTE — CONSULT NOTE ADULT - ATTENDING COMMENTS
No cardiac history.    Palpitations  Newly diagnosed AF  Spontaneous conversion NSR  [id number] = 2    Hemodynamics stable    ECHO: nL LVSF.  Mild LVH.  Mild valve disease.    - Eliquis 5mg q12  - Change Metoprolol to Toprol  - Outpatient follow-up

## 2023-05-11 NOTE — CONSULT NOTE ADULT - SUBJECTIVE AND OBJECTIVE BOX
Outpt cardiologist:    HPI:  74 yo M w h/o HTN, HLD, Gout, self-diagnosed panic attacks presents for evaluation of a new panic attack. Patient reports he has been having panic attacks since 2019, initially 1-2 times a month and more recently have been occurring more frequently, ~4 times a month. Patient confirms that he has never been formally diagnosed with these attacks or taking any medications for them. Patient described the attacks as chest tightness/palpitations, sob, lightheadedness and dizziness. At time of exam patient asymptomatic but says its only because he is resting.     In ED, T 97.6, /86, , RR 18, Spo2 99% on RA; Labs unremarkable, including trop x2. EKG showed Afib w RVR which is new.       PAST MEDICAL & SURGICAL HISTORY  Hypertension    High cholesterol    Gout    History of appendectomy        FAMILY HISTORY:  FAMILY HISTORY:  Family history of pancreatic cancer (Father)  father    Family history of breast cancer (Mother)        SOCIAL HISTORY:  Social History:  former smoker (11 May 2023 02:40)      ALLERGIES:  aspirin (Unknown)  Plavix (Unknown)  penicillin (Other; Urticaria)      MEDICATIONS:  brimonidine 0.2% Ophthalmic Solution 1 Drop(s) Both EYES daily  colchicine 0.6 milliGRAM(s) Oral daily  dorzolamide 2% Ophthalmic Solution 1 Drop(s) Both EYES three times a day  enoxaparin Injectable 95 milliGRAM(s) SubCutaneous every 12 hours  latanoprost 0.005% Ophthalmic Solution 1 Drop(s) Both EYES at bedtime  metoprolol tartrate 25 milliGRAM(s) Oral two times a day  omega-3-Acid Ethyl Esters 2 Gram(s) Oral two times a day  timolol 0.5% Solution 1 Drop(s) Both EYES two times a day    PRN:  acetaminophen     Tablet .. 650 milliGRAM(s) Oral every 6 hours PRN  aluminum hydroxide/magnesium hydroxide/simethicone Suspension 30 milliLiter(s) Oral every 4 hours PRN  melatonin 5 milliGRAM(s) Oral at bedtime PRN      HOME MEDICATIONS:  Home Medications:  allopurinol 100 mg oral tablet: 1 tab(s) orally once a day (11 May 2023 10:07)  brimonidine 0.2% ophthalmic solution: 1 drop(s) to each affected eye once a day (11 May 2023 10:05)  dorzolamide 2% ophthalmic solution: 1 drop(s) to each affected eye 3 times a day (11 May 2023 10:05)  latanoprost 0.005% ophthalmic solution: 1 drop(s) to each affected eye once a day (at bedtime) (11 May 2023 10:05)  omega-3 polyunsaturated fatty acids ethyl esters 1000 mg oral capsule: 2 cap(s) orally 2 times a day (11 May 2023 10:05)  timolol maleate 0.5% ophthalmic solution: 1 drop(s) to each affected eye 2 times a day (11 May 2023 10:05)      VITALS:   T(F): 97.1 ( @ 07:54), Max: 98 (05-10 @ 20:05)  HR: 58 ( @ 07:54) (58 - 105)  BP: 100/63 ( @ 07:54) (100/63 - 157/86)  BP(mean): --  RR: 18 ( @ 07:54) (18 - 18)  SpO2: 94% ( @ 07:54) (94% - 99%)    I&O's Summary      REVIEW OF SYSTEMS:  CONSTITUTIONAL: No weakness, fevers or chills  HEENT: No visual changes, neck/ear pain  RESPIRATORY: No cough, sob  CARDIOVASCULAR: See HPI  GASTROINTESTINAL: No abdominal pain. No nausea, vomiting, diarrhea   GENITOURINARY: No dysuria, frequency or hematuria  NEUROLOGICAL: No new focal deficits  SKIN: No new rashes    PHYSICAL EXAM:  General: Not in distress.  Non-toxic appearing.   HEENT: EOMI  Cardio: regular, S1, S2, no murmur  Pulm: B/L BS.  No wheezing / crackles / rales  Abdomen: Soft, non-tender, non-distended. Normoactive bowel sounds  Extremities: No edema b/l le  Neuro: A&O x3. No focal deficits    LABS:                        16.0   7.11  )-----------( 149      ( 11 May 2023 07:04 )             46.2         140  |  103  |  15  ----------------------------<  95  4.4   |  25  |  1.1    Ca    9.3      11 May 2023 07:04  Phos  2.9       Mg     2.2         TPro  6.7  /  Alb  4.0  /  TBili  1.0  /  DBili  x   /  AST  39  /  ALT  45<H>  /  AlkPhos  68        Creatine Kinase, Serum: 243 U/L *H* (23 @ 09:28)  Troponin T, Serum: <0.01 ng/mL (23 @ 00:20)  Troponin T, Serum: <0.01 ng/mL (05-10-23 @ 21:07)    CARDIAC MARKERS ( 11 May 2023 09:28 )  x     / x     / 243 U/L / x     / x      CARDIAC MARKERS ( 11 May 2023 00:20 )  x     / <0.01 ng/mL / x     / x     / x      CARDIAC MARKERS ( 10 May 2023 21:07 )  x     / <0.01 ng/mL / x     / x     / x            Troponin trend:       Chol 239<H> LDL -- HDL 41 Trig 179<H>      RADIOLOGY:  - echo (): EF 60%, with normal LVF      EC Lead ECG:   Ventricular Rate 56 BPM    Atrial Rate 56 BPM    P-R Interval 156 ms    QRS Duration 78 ms    Q-T Interval 440 ms    QTC Calculation(Bazett) 424 ms    P Axis 42 degrees    R Axis 50 degrees    T Axis 182 degrees    Diagnosis Line Sinus bradycardia  Left ventricular hypertrophy with repolarization abnormality ( R in aVL )  Possible Inferior infarct , age undetermined  Abnormal ECG    Confirmed by Maximo Jerry ([id number]) on 2023 8:24:42 AM ( @ 00:26)   HPI:  74 yo M w h/o HTN, HLD, Gout, self-diagnosed panic attacks presents for evaluation of a new panic attack. Patient reports he has been having panic attacks since 2019, initially 1-2 times a month and more recently have been occurring more frequently, ~4 times a month. Patient confirms that he has never been formally diagnosed with these attacks or taking any medications for them. Patient described the attacks as chest tightness/palpitations, sob, lightheadedness and dizziness. At time of exam patient asymptomatic but says its only because he is resting.     In ED, T 97.6, /86, , RR 18, Spo2 99% on RA; Labs unremarkable, including trop x2. EKG showed Afib w RVR which is new.       PAST MEDICAL & SURGICAL HISTORY  Hypertension    High cholesterol    Gout    History of appendectomy        FAMILY HISTORY:  FAMILY HISTORY:  Family history of pancreatic cancer (Father)  father    Family history of breast cancer (Mother)        SOCIAL HISTORY:  Social History:  former smoker (11 May 2023 02:40)      ALLERGIES:  aspirin (Unknown)  Plavix (Unknown)  penicillin (Other; Urticaria)      MEDICATIONS:  brimonidine 0.2% Ophthalmic Solution 1 Drop(s) Both EYES daily  colchicine 0.6 milliGRAM(s) Oral daily  dorzolamide 2% Ophthalmic Solution 1 Drop(s) Both EYES three times a day  enoxaparin Injectable 95 milliGRAM(s) SubCutaneous every 12 hours  latanoprost 0.005% Ophthalmic Solution 1 Drop(s) Both EYES at bedtime  metoprolol tartrate 25 milliGRAM(s) Oral two times a day  omega-3-Acid Ethyl Esters 2 Gram(s) Oral two times a day  timolol 0.5% Solution 1 Drop(s) Both EYES two times a day    PRN:  acetaminophen     Tablet .. 650 milliGRAM(s) Oral every 6 hours PRN  aluminum hydroxide/magnesium hydroxide/simethicone Suspension 30 milliLiter(s) Oral every 4 hours PRN  melatonin 5 milliGRAM(s) Oral at bedtime PRN      HOME MEDICATIONS:  Home Medications:  allopurinol 100 mg oral tablet: 1 tab(s) orally once a day (11 May 2023 10:07)  brimonidine 0.2% ophthalmic solution: 1 drop(s) to each affected eye once a day (11 May 2023 10:05)  dorzolamide 2% ophthalmic solution: 1 drop(s) to each affected eye 3 times a day (11 May 2023 10:05)  latanoprost 0.005% ophthalmic solution: 1 drop(s) to each affected eye once a day (at bedtime) (11 May 2023 10:05)  omega-3 polyunsaturated fatty acids ethyl esters 1000 mg oral capsule: 2 cap(s) orally 2 times a day (11 May 2023 10:05)  timolol maleate 0.5% ophthalmic solution: 1 drop(s) to each affected eye 2 times a day (11 May 2023 10:05)      VITALS:   T(F): 97.1 ( @ 07:54), Max: 98 (05-10 @ 20:05)  HR: 58 ( @ 07:54) (58 - 105)  BP: 100/63 ( @ 07:54) (100/63 - 157/86)  BP(mean): --  RR: 18 ( @ 07:54) (18 - 18)  SpO2: 94% ( @ 07:54) (94% - 99%)    I&O's Summary      REVIEW OF SYSTEMS:  CONSTITUTIONAL: No weakness, fevers or chills  HEENT: No visual changes, neck/ear pain  RESPIRATORY: No cough, sob  CARDIOVASCULAR: See HPI  GASTROINTESTINAL: No abdominal pain. No nausea, vomiting, diarrhea   GENITOURINARY: No dysuria, frequency or hematuria  NEUROLOGICAL: No new focal deficits  SKIN: No new rashes    PHYSICAL EXAM:  General: Not in distress.  Non-toxic appearing.   HEENT: EOMI  Cardio: regular, S1, S2, no murmur  Pulm: B/L BS.  No wheezing / crackles / rales  Abdomen: Soft, non-tender, non-distended. Normoactive bowel sounds  Extremities: No edema b/l le  Neuro: A&O x3. No focal deficits    LABS:                        16.0   7.11  )-----------( 149      ( 11 May 2023 07:04 )             46.2     -    140  |  103  |  15  ----------------------------<  95  4.4   |  25  |  1.1    Ca    9.3      11 May 2023 07:04  Phos  2.9       Mg     2.2         TPro  6.7  /  Alb  4.0  /  TBili  1.0  /  DBili  x   /  AST  39  /  ALT  45<H>  /  AlkPhos  68        Creatine Kinase, Serum: 243 U/L *H* (23 @ 09:28)  Troponin T, Serum: <0.01 ng/mL (23 @ 00:20)  Troponin T, Serum: <0.01 ng/mL (05-10-23 @ 21:07)    CARDIAC MARKERS ( 11 May 2023 09:28 )  x     / x     / 243 U/L / x     / x      CARDIAC MARKERS ( 11 May 2023 00:20 )  x     / <0.01 ng/mL / x     / x     / x      CARDIAC MARKERS ( 10 May 2023 21:07 )  x     / <0.01 ng/mL / x     / x     / x            Troponin trend:       Chol 239<H> LDL -- HDL 41 Trig 179<H>      RADIOLOGY:  ECHO (23):   1. Normal global left ventricularsystolic function.   2. LV Ejection Fraction by Mckeon's Method with a biplane EF of 58 %.   3. Mild concentric left ventricular hypertrophy.   4. Mildly increased LV wall thickness.   5. Moderately enlarged left atrium.   6. Normal right atrial size.   7. Mild mitral valve regurgitation.   8. Mild tricuspid regurgitation.   9. Mild aortic regurgitation.        EC Lead ECG:   Ventricular Rate 56 BPM    Atrial Rate 56 BPM    P-R Interval 156 ms    QRS Duration 78 ms    Q-T Interval 440 ms    QTC Calculation(Bazett) 424 ms    P Axis 42 degrees    R Axis 50 degrees    T Axis 182 degrees    Diagnosis Line Sinus bradycardia  Left ventricular hypertrophy with repolarization abnormality ( R in aVL )  Possible Inferior infarct , age undetermined  Abnormal ECG    Confirmed by Maximo Jerry ([id number]) on 2023 8:24:42 AM ( @ 00:26)

## 2023-05-11 NOTE — DISCHARGE NOTE NURSING/CASE MANAGEMENT/SOCIAL WORK - NSDCPEFALRISK_GEN_ALL_CORE
For information on Fall & Injury Prevention, visit: [email or web address] OR  [email or web address] OR  https://www.cdc.gov/steadi/patient.html

## 2023-05-11 NOTE — CONSULT NOTE ADULT - SUBJECTIVE AND OBJECTIVE BOX
Patient is a 73y old  Male who presents with a chief complaint of atrial fibrillation (11 May 2023 12:46)      HPI:  72 yo M w h/o HTN, HLD, Gout, self-diagnosed panic attacks presents for evaluation of a new panic attack. Patient reports he has been having panic attacks since 2019, initially 1-2 times a month and more recently have been occurring more frequently, ~4 times a month. Patient confirms that he has never been formally diagnosed with these attacks or taking any medications for them. Patient described the attacks as chest tightness/palpitations, sob, lightheadedness and dizziness. At time of exam patient asymptomatic but says its only because he is resting.     In ED, T 97.6, /86, , RR 18, Spo2 99% on RA; Labs unremarkable, including trop x2. EKG showed Afib w RVR which is new.     Patient admitted for further evaluation.  (11 May 2023 02:40)      EP consulted for new onset AF     REVIEW OF SYSTEMS    [x] A ten-point review of systems was otherwise negative except as noted.  [ ] Due to altered mental status/intubation, subjective information were not able to be obtained from the patient. History was obtained, to the extent possible, from review of the chart and collateral sources of information.      PAST MEDICAL & SURGICAL HISTORY:  Hypertension      High cholesterol      Gout      History of appendectomy          Home Medications:  allopurinol 100 mg oral tablet: 1 tab(s) orally once a day (11 May 2023 10:07)  brimonidine 0.2% ophthalmic solution: 1 drop(s) to each affected eye once a day (11 May 2023 10:05)  dorzolamide 2% ophthalmic solution: 1 drop(s) to each affected eye 3 times a day (11 May 2023 10:05)  latanoprost 0.005% ophthalmic solution: 1 drop(s) to each affected eye once a day (at bedtime) (11 May 2023 10:05)  omega-3 polyunsaturated fatty acids ethyl esters 1000 mg oral capsule: 2 cap(s) orally 2 times a day (11 May 2023 10:05)  timolol maleate 0.5% ophthalmic solution: 1 drop(s) to each affected eye 2 times a day (11 May 2023 10:05)      Allergies:    aspirin (Unknown)  Plavix (Unknown)  penicillin (Other; Urticaria)      FAMILY HISTORY:  Family history of pancreatic cancer (Father)  father    Family history of breast cancer (Mother)        SOCIAL HISTORY:  CIGARETTES: denies  ALCOHOL: quit 30 years ago    MEDICATIONS  (STANDING):  brimonidine 0.2% Ophthalmic Solution 1 Drop(s) Both EYES daily  colchicine 0.6 milliGRAM(s) Oral daily  dorzolamide 2% Ophthalmic Solution 1 Drop(s) Both EYES three times a day  enoxaparin Injectable 95 milliGRAM(s) SubCutaneous every 12 hours  latanoprost 0.005% Ophthalmic Solution 1 Drop(s) Both EYES at bedtime  metoprolol tartrate 25 milliGRAM(s) Oral two times a day  omega-3-Acid Ethyl Esters 2 Gram(s) Oral two times a day  timolol 0.5% Solution 1 Drop(s) Both EYES two times a day    MEDICATIONS  (PRN):  acetaminophen     Tablet .. 650 milliGRAM(s) Oral every 6 hours PRN Temp greater or equal to 38C (100.4F), Mild Pain (1 - 3), Moderate Pain (4 - 6)  aluminum hydroxide/magnesium hydroxide/simethicone Suspension 30 milliLiter(s) Oral every 4 hours PRN Dyspepsia  melatonin 5 milliGRAM(s) Oral at bedtime PRN Insomnia      Vital Signs Last 24 Hrs  T(C): 36.2 (11 May 2023 07:54), Max: 36.7 (10 May 2023 20:05)  T(F): 97.1 (11 May 2023 07:54), Max: 98 (10 May 2023 20:05)  HR: 58 (11 May 2023 07:54) (58 - 105)  BP: 100/63 (11 May 2023 07:54) (100/63 - 157/86)  BP(mean): --  RR: 18 (11 May 2023 07:54) (18 - 18)  SpO2: 94% (11 May 2023 07:54) (94% - 99%)    Parameters below as of 11 May 2023 07:54  Patient On (Oxygen Delivery Method): room air        PHYSICAL EXAM:    GENERAL: Overweight male, In no apparent distress, well nourished, and hydrated.  HEART: Regular rate and rhythm; No murmurs, rubs, or gallops.  PULMONARY: Clear to auscultation and perfusion.  No rales, wheezing, or rhonchi bilaterally.  ABDOMEN: Soft, Nontender, Nondistended; Bowel sounds present  EXTREMITIES:  2+ Peripheral Pulses, No clubbing, cyanosis, or edema  NEUROLOGICAL: AO x4, DAVIS, speech clear    INTERPRETATION OF TELEMETRY: SR    ECG:  < from: 12 Lead ECG (05.10.23 @ 19:08) >    Ventricular Rate 119 BPM    QRS Duration 82 ms    Q-T Interval 270 ms    QTC Calculation(Bazett) 379 ms    R Axis 72 degrees    T Axis 249 degrees    Diagnosis Line Atrial fibrillation with rapid ventricular response  ST & T wave abnormality, consider lateral ischemia  Abnormal ECG    Confirmed by AMBROSE ULLOA, [facility] ([id number]) on 5/10/2023 10:20:41 PM    < end of copied text >      I&O's Detail      LABS:                        16.0   7.11  )-----------( 149      ( 11 May 2023 07:04 )             46.2     05-11    140  |  103  |  15  ----------------------------<  95  4.4   |  25  |  1.1    Ca    9.3      11 May 2023 07:04  Phos  2.9     05-11  Mg     2.2     05-11    TPro  6.7  /  Alb  4.0  /  TBili  1.0  /  DBili  x   /  AST  39  /  ALT  45<H>  /  AlkPhos  68  05-11    CARDIAC MARKERS ( 11 May 2023 09:28 )  x     / x     / 243 U/L / x     / x      CARDIAC MARKERS ( 11 May 2023 00:20 )  x     / <0.01 ng/mL / x     / x     / x      CARDIAC MARKERS ( 10 May 2023 21:07 )  x     / <0.01 ng/mL / x     / x     / x            RADIOLOGY:  < from: TTE Echo Complete w/ Contrast w/ Doppler (11.13.21 @ 11:28) >  Summary:   1. Left ventricular ejection fraction, by visual estimation, is 60 to 65%.   2. Normal left ventricular size and wall thicknesses, with normal systolic and diastolic function.   3. Mild aortic regurgitation.   4. Mild mitral regurgitation.   5. Mild tricuspid regurgitation.    < end of copied text >

## 2023-05-11 NOTE — DISCHARGE NOTE PROVIDER - NSDCMRMEDTOKEN_GEN_ALL_CORE_FT
Adult Aspirin Regimen 81 mg oral delayed release tablet: 1 tab(s) orally once a day  atorvastatin 80 mg oral tablet: 1 tab(s) orally once a day   Toprol-XL 50 mg oral tablet, extended release: 1 tab(s) orally once a day    allopurinol 100 mg oral tablet: 1 tab(s) orally once a day  brimonidine 0.2% ophthalmic solution: 1 drop(s) to each affected eye once a day  dorzolamide 2% ophthalmic solution: 1 drop(s) to each affected eye 3 times a day  Eliquis 5 mg oral tablet: 1 tab(s) orally every 12 hours  latanoprost 0.005% ophthalmic solution: 1 drop(s) to each affected eye once a day (at bedtime)  metoprolol tartrate 25 mg oral tablet: 1 tab(s) orally 2 times a day  omega-3 polyunsaturated fatty acids ethyl esters 1000 mg oral capsule: 2 cap(s) orally 2 times a day  timolol maleate 0.5% ophthalmic solution: 1 drop(s) to each affected eye 2 times a day

## 2023-05-11 NOTE — CONSULT NOTE ADULT - NS ATTEND AMEND GEN_ALL_CORE FT
complex patient  new-onset AF  recommend outpatient f/u to discuss ablation  patient interested in catheter ablation  outpatient referral for MERLENE evaluation

## 2023-05-11 NOTE — H&P ADULT - NSHPLABSRESULTS_GEN_ALL_CORE
Labs:                        15.9   8.67  )-----------( 148      ( 10 May 2023 21:07 )             46.3     138  |  104  |  17  ----------------------------<  87  4.3   |  22  |  1.0    Ca    9.6      10 May 2023 21:07  Mg     2.3     05-10    TPro  7.0  /  Alb  4.1  /  TBili  0.6  /  DBili  x   /  AST  39  /  ALT  41  /  AlkPhos  77  05-10    Troponin T, Serum: <0.01 ng/mL (05-11-23 @ 00:20)  Troponin T, Serum: <0.01 ng/mL (05-10-23 @ 21:07)

## 2023-05-11 NOTE — H&P ADULT - ATTENDING COMMENTS
Patient seen and examined at bedside independently of the residents. I read the resident's note and agree with the plan with the additions and corrections as noted below. My note supersedes the resident's note.     REVIEW OF SYSTEMS:  Negative except in HPI.       PMH: HTN, HLD, Gout, Panic attacks    FHx: Reviewed. No fhx of asthma/copd, No fhx of liver and pulmonary disease. No fhx of hematological disorder.     Physical Exam:  GEN: No acute distress. Awake, Alert and oriented x 3.   Head: Atraumatic, Normocephalic.   Eye: PEERLA. No sclera icterus. EOMI.   ENT: Normal oropharynx, no thyromegaly, no mass, no lymphadenopathy.   LUNGS: Clear to auscultation bilaterally. No wheeze/rales/crackles.   HEART: Normal. S1/S2 present. RRR. No murmur/gallops.   ABD: Soft, non-tender, non-distended. Bowel sounds present.   EXT: No pitting edema. No erythema. No tenderness.  Integumentary: No rash, No sore, No petechia.   NEURO: CN III-XII intact. Strength: 5/5 b/l ULE. Sensory intact b/l ULE.     Vital Signs Last 24 Hrs  T(C): 36.2 (10 May 2023 23:39), Max: 36.7 (10 May 2023 20:05)  T(F): 97.2 (10 May 2023 23:39), Max: 98 (10 May 2023 20:05)  HR: 64 (10 May 2023 23:39) (64 - 105)  BP: 121/74 (10 May 2023 23:39) (121/74 - 157/86)  BP(mean): --  RR: 18 (10 May 2023 23:39) (18 - 18)  SpO2: 95% (10 May 2023 23:39) (95% - 99%)    Parameters below as of 10 May 2023 19:08  Patient On (Oxygen Delivery Method): room air      Please see the above notes for Labs and radiology.     Assessment and Plan:     72 yo M with hx of HTN, HLD, Gout, Panic attacks presents for evaluation of multiple episodes of palpitation, chest tightness, SOB and dizziness.     Paroxysmal New onset A.fib RVR (resolved)  - EKG shows A.fib with HR ~ 119.   - patient is on Toprol 50mg qd at home --> will switch to PO lopressor 25mg BID for titration.   - CHADs VASc score 2 --> will start on Lovenox BID  - check TTE and TSH   - monitor on Telemetry.   - EP consult.     HTN/HLD - c/ whome med  Pre-DM II - monitor FS AC HS. Insulin regimen.   Gout - on steroid taper and colchicine     DVT ppx: lovenox SC  GI ppx: PPI   Diet: DASH diet  Activity: as tolerated.     Date seen by the attendin2023  Total time spent: 75 minutes.

## 2023-05-11 NOTE — DISCHARGE NOTE PROVIDER - NSDCFUSCHEDAPPT_GEN_ALL_CORE_FT
Landen Arrieta  [facility] Physician [facility]  PULMMED [address]  Scheduled Appointment: 06/08/2023    Terrell Heard  [facility] Physician [facility]  CARDIOLOGY [address]  Scheduled Appointment: 08/03/2023

## 2023-05-11 NOTE — DISCHARGE NOTE PROVIDER - ATTENDING DISCHARGE PHYSICAL EXAMINATION:
Plastic Surgery PHYSICAL EXAM:  GENERAL: NAD, well-developed  HEAD:  Atraumatic, Normocephalic  EYES: EOMI, PERRLA, conjunctiva and sclera clear  NECK: Supple, No JVD  CHEST/LUNG: Clear to auscultation bilaterally; No wheeze  HEART: Regular rate and rhythm; No murmurs, rubs, or gallops  ABDOMEN: Soft, Nontender, Nondistended; Bowel sounds present  EXTREMITIES:  2+ Peripheral Pulses, No clubbing, cyanosis, or edema  PSYCH: AAOx3  SKIN: No rashes or lesions

## 2023-05-11 NOTE — DISCHARGE NOTE PROVIDER - NSDCCPCAREPLAN_GEN_ALL_CORE_FT
PRINCIPAL DISCHARGE DIAGNOSIS  Diagnosis: Paroxysmal atrial fibrillation  Assessment and Plan of Treatment: you presented for panic attacks but were found to have atrial fibrillation. you self converted back to normal rhythm and the rate is controlled. please follow up with cardiology and your pcp

## 2023-05-11 NOTE — DISCHARGE NOTE NURSING/CASE MANAGEMENT/SOCIAL WORK - PATIENT PORTAL LINK FT
You can access the FollowMyHealth Patient Portal offered by [facility] by registering at the following website: [email or web address]. By joining [facility]’s FollowMyHealth portal, you will also be able to view your health information using other applications (apps) compatible with our system.

## 2023-05-11 NOTE — DISCHARGE NOTE PROVIDER - CARE PROVIDERS DIRECT ADDRESSES
[email or web address]

## 2023-05-11 NOTE — H&P ADULT - HISTORY OF PRESENT ILLNESS
72 yo M w h/o HTN, HLD, Gout, Panic attacks presents for evaluation of panic attack. Patient reports he suffers from panic attacks 4 times per month, states he has not seen a psychiatrist, not on any medications for anxiety.  Patient reports intermittently develops chest tightness, shortness of breath, dizziness, palpitations.  Patient reports symptoms have resolved at this time however occur intermittently.      In ED, T 97.6, /86, , RR 18, Spo2 99% on RA; Labs unremarkable, including trop x2.   EKG showed Afib w RVR which is new.     Patient admitted for further evaluation.  72 yo M w h/o HTN, HLD, Gout, self-diagnosed panic attacks presents for evaluation of a new panic attack. Patient reports he has been having panic attacks since 2019, initially 1-2 times a month and more recently have been occurring more frequently, ~4 times a month. Patient confirms that he has never been formally diagnosed with these attacks or taking any medications for them. Patient described the attacks as chest tightness/palpitations, sob, lightheadedness and dizziness. At time of exam patient asymptomatic but says its only because he is resting.     In ED, T 97.6, /86, , RR 18, Spo2 99% on RA; Labs unremarkable, including trop x2. EKG showed Afib w RVR which is new.     Patient admitted for further evaluation.

## 2023-05-11 NOTE — H&P ADULT - NSHPPHYSICALEXAM_GEN_ALL_CORE
Vital Signs Last 24 Hrs  T(C): 36.2 (10 May 2023 23:39), Max: 36.7 (10 May 2023 20:05)  T(F): 97.2 (10 May 2023 23:39), Max: 98 (10 May 2023 20:05)  HR: 64 (10 May 2023 23:39) (64 - 105)  BP: 121/74 (10 May 2023 23:39) (121/74 - 157/86)  BP(mean): --  RR: 18 (10 May 2023 23:39) (18 - 18)  SpO2: 95% (10 May 2023 23:39) (95% - 99%)    Parameters below as of 10 May 2023 19:08  Patient On (Oxygen Delivery Method): room air    PHYSICAL EXAM  GENERAL: NAD  HEAD:  NCAT, EOMI, MMM  NECK: Supple, Nontender  NERVOUS SYSTEM: AAOx3, NFD   CHEST/LUNG: + BS b/l  HEART: +s1s2 RRR  ABDOMEN: soft, NT/ND  EXTREMITIES: pp no edema  SKIN: No rashes or lesions Vital Signs Last 24 Hrs  T(C): 36.2 (10 May 2023 23:39), Max: 36.7 (10 May 2023 20:05)  T(F): 97.2 (10 May 2023 23:39), Max: 98 (10 May 2023 20:05)  HR: 64 (10 May 2023 23:39) (64 - 105)  BP: 121/74 (10 May 2023 23:39) (121/74 - 157/86)  BP(mean): --  RR: 18 (10 May 2023 23:39) (18 - 18)  SpO2: 95% (10 May 2023 23:39) (95% - 99%)    Parameters below as of 10 May 2023 19:08  Patient On (Oxygen Delivery Method): room air    PHYSICAL EXAM  GENERAL: NAD  HEAD:  NCAT, EOMI, red sclera  NECK: Supple, Nontender  NERVOUS SYSTEM: AAOx3, NFD   CHEST/LUNG: + BS b/l  HEART: +s1s2 RRR  ABDOMEN: soft, NT/ND  EXTREMITIES: pp, no edema  SKIN: No rashes or lesions

## 2023-05-11 NOTE — DISCHARGE NOTE PROVIDER - PROVIDER TOKENS
PROVIDER:[TOKEN:[[id number]],FOLLOWUP:[2 weeks]],PROVIDER:[TOKEN:[[id number]],FOLLOWUP:[1 week]] PROVIDER:[TOKEN:[[id number]],FOLLOWUP:[2 weeks]],PROVIDER:[TOKEN:[[id number]],FOLLOWUP:[1 week]],PROVIDER:[TOKEN:[[id number]],FOLLOWUP:[1 month]]

## 2023-05-16 DIAGNOSIS — Z87.891 PERSONAL HISTORY OF NICOTINE DEPENDENCE: ICD-10-CM

## 2023-05-16 DIAGNOSIS — R73.03 PREDIABETES: ICD-10-CM

## 2023-05-16 DIAGNOSIS — Z80.3 FAMILY HISTORY OF MALIGNANT NEOPLASM OF BREAST: ICD-10-CM
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
## 2023-05-16 DIAGNOSIS — E66.9 OBESITY, UNSPECIFIED: ICD-10-CM

## 2023-05-16 DIAGNOSIS — M10.9 GOUT, UNSPECIFIED: ICD-10-CM

## 2023-05-16 DIAGNOSIS — E78.00 PURE HYPERCHOLESTEROLEMIA, UNSPECIFIED: ICD-10-CM

## 2023-05-16 DIAGNOSIS — Z88.0 ALLERGY STATUS TO PENICILLIN: ICD-10-CM

## 2023-05-16 DIAGNOSIS — I25.10 ATHEROSCLEROTIC HEART DISEASE OF NATIVE CORONARY ARTERY WITHOUT ANGINA PECTORIS: ICD-10-CM

## 2023-05-16 DIAGNOSIS — F41.0 PANIC DISORDER [EPISODIC PAROXYSMAL ANXIETY]: ICD-10-CM

## 2023-05-16 DIAGNOSIS — G72.0 DRUG-INDUCED MYOPATHY: ICD-10-CM

## 2023-05-16 DIAGNOSIS — T46.6X5A ADVERSE EFFECT OF ANTIHYPERLIPIDEMIC AND ANTIARTERIOSCLEROTIC DRUGS, INITIAL ENCOUNTER: ICD-10-CM

## 2023-05-16 DIAGNOSIS — Z79.82 LONG TERM (CURRENT) USE OF ASPIRIN: ICD-10-CM

## 2023-05-16 DIAGNOSIS — I48.0 PAROXYSMAL ATRIAL FIBRILLATION: ICD-10-CM

## 2023-05-16 DIAGNOSIS — I10 ESSENTIAL (PRIMARY) HYPERTENSION: ICD-10-CM

## 2023-05-16 DIAGNOSIS — Z80.8 FAMILY HISTORY OF MALIGNANT NEOPLASM OF OTHER ORGANS OR SYSTEMS: ICD-10-CM

## 2023-06-06 ENCOUNTER — APPOINTMENT (OUTPATIENT)
Dept: CARDIOLOGY | Facility: CLINIC | Age: 73
End: 2023-06-06
Payer: COMMERCIAL

## 2023-06-06 ENCOUNTER — APPOINTMENT (OUTPATIENT)
Dept: ELECTROPHYSIOLOGY | Facility: CLINIC | Age: 73
End: 2023-06-06
Payer: COMMERCIAL

## 2023-06-06 VITALS
DIASTOLIC BLOOD PRESSURE: 90 MMHG | HEIGHT: 68 IN | HEART RATE: 59 BPM | SYSTOLIC BLOOD PRESSURE: 130 MMHG | RESPIRATION RATE: 16 BRPM | TEMPERATURE: 97.1 F | BODY MASS INDEX: 30.92 KG/M2 | WEIGHT: 204 LBS

## 2023-06-06 DIAGNOSIS — Z87.891 PERSONAL HISTORY OF NICOTINE DEPENDENCE: ICD-10-CM

## 2023-06-06 DIAGNOSIS — Z71.9 COUNSELING, UNSPECIFIED: ICD-10-CM

## 2023-06-06 DIAGNOSIS — Z78.9 OTHER SPECIFIED HEALTH STATUS: ICD-10-CM

## 2023-06-06 DIAGNOSIS — Z87.898 PERSONAL HISTORY OF OTHER SPECIFIED CONDITIONS: ICD-10-CM

## 2023-06-06 DIAGNOSIS — Z80.0 FAMILY HISTORY OF MALIGNANT NEOPLASM OF DIGESTIVE ORGANS: ICD-10-CM

## 2023-06-06 DIAGNOSIS — Z80.3 FAMILY HISTORY OF MALIGNANT NEOPLASM OF BREAST: ICD-10-CM

## 2023-06-06 PROCEDURE — ZZZZZ: CPT

## 2023-06-06 PROCEDURE — 99215 OFFICE O/P EST HI 40 MIN: CPT | Mod: 25

## 2023-06-06 PROCEDURE — 93000 ELECTROCARDIOGRAM COMPLETE: CPT

## 2023-06-08 ENCOUNTER — APPOINTMENT (OUTPATIENT)
Dept: PULMONOLOGY | Facility: CLINIC | Age: 73
End: 2023-06-08
Payer: COMMERCIAL

## 2023-06-08 DIAGNOSIS — R93.89 ABNORMAL FINDINGS ON DIAGNOSTIC IMAGING OF OTHER SPECIFIED BODY STRUCTURES: ICD-10-CM

## 2023-06-08 PROCEDURE — 99203 OFFICE O/P NEW LOW 30 MIN: CPT

## 2023-06-08 NOTE — REASON FOR VISIT
[Initial] : an initial visit [Sleep Evaluation] : sleep evaluation [Obesity] : obesity [TextBox_44] : The patient was recently admitted to the hospital for what he thought was panic attacks.  He was found to have significant atrial fibrillation.  He had an MCOT performed showing runs of atrial fibrillation especially at night.  He has seen a cardiologist and he is undergoing treatment.  They are planning an ablation in the near future.  He presents for the evaluation of possible sleep apnea.

## 2023-06-08 NOTE — HISTORY OF PRESENT ILLNESS
[TextBox_4] : The patient presents for the evaluation of loud snoring and restlessness at night.\par The patient is restless during sleep and has frequent nocturia.\par The bed partner confirms the restlessness.\par During the day there are episodes on increased sleepiness.\par \par \par I reviewed and interpreted any  CT available in the files\par I discussed the results today with the patient.\par \par The patient had a CAT scan in 11/21 that demonstrated a right lower lobe groundglass nodule.  There was a CAT scan performed 2014 which in hindsight may have showed a similar nodule.\par \par

## 2023-06-08 NOTE — ASSESSMENT
[FreeTextEntry1] : Assessment: \par There is a high clinical suspicion for MERLENE,  the patient has classic signs of obstructive sleep apnea.\par Obesity\par \par Plan:\par I will order home sleep testing and I will see the patient  in F/U to arrange for therapies as needed.\par Weight loss was discussed with the patient. \par The patient was counseled against driving in a sleepy state\par \par The patient has a right lower lobe pulmonary nodule seen in November 2021.  It may have been present back to 2014.  The patient should have a repeat CAT scan to further evaluate this area.  He is scheduled for a cardiac CAT scan in the near future which may show this area.\par

## 2023-06-12 ENCOUNTER — OUTPATIENT (OUTPATIENT)
Dept: OUTPATIENT SERVICES | Facility: HOSPITAL | Age: 73
LOS: 1 days | Discharge: HOME | End: 2023-06-12
Payer: COMMERCIAL

## 2023-06-12 ENCOUNTER — APPOINTMENT (OUTPATIENT)
Dept: SLEEP CENTER | Facility: HOSPITAL | Age: 73
End: 2023-06-12
Payer: COMMERCIAL

## 2023-06-12 DIAGNOSIS — Z98.890 OTHER SPECIFIED POSTPROCEDURAL STATES: Chronic | ICD-10-CM

## 2023-06-12 DIAGNOSIS — G47.33 OBSTRUCTIVE SLEEP APNEA (ADULT) (PEDIATRIC): ICD-10-CM

## 2023-06-12 PROCEDURE — 95800 SLP STDY UNATTENDED: CPT

## 2023-06-12 PROCEDURE — 95800 SLP STDY UNATTENDED: CPT | Mod: 26

## 2023-06-14 DIAGNOSIS — G47.33 OBSTRUCTIVE SLEEP APNEA (ADULT) (PEDIATRIC): ICD-10-CM

## 2023-06-25 NOTE — CARDIOLOGY SUMMARY
[de-identified] : (06/06/2023): Sinus rhythm at 59 bpm, diffuse T wave inversion precordially, Q waves in lead III. \par  [de-identified] : (04/05/2018): Nuclear stress test. No evidence of ischemia.  [de-identified] : (05/11/2023): 2D echo. EF 58%. Mild concentric LVH. Moderately enlarged left atrium. Mild MR. Mild TR. Mild AI. [de-identified] : (11/12/2021): Coronary angiogram at [facility]. Mild irregularity of the coronary anatomy. Non-obstructive CAD. \par

## 2023-06-25 NOTE — END OF VISIT
[Time Spent: ___ minutes] : I have spent [unfilled] minutes of time on the encounter. [FreeTextEntry3] : I, Edward Fonseca, personally performed the services described in this documentation. All medical record entries made by the scribe/nurse CTA were at my direction and in my presence. I have reviewed the chart and agree that the record reflects my personal performance and is accurate and complete.\par

## 2023-06-25 NOTE — ADDENDUM
[FreeTextEntry1] : Nika ROSENTHAL assisted in documentation on 06/07/2023   acting as a scribe for Dr. Devorah Fonseca.\par

## 2023-06-25 NOTE — REASON FOR VISIT
[Arrhythmia/ECG Abnorrmalities] : arrhythmia/ECG abnormalities [FreeTextEntry3] : Dr. Beckie Lilly - Dr. Terrell Heard

## 2023-06-25 NOTE — HISTORY OF PRESENT ILLNESS
[FreeTextEntry1] : Hypertension, hyperlipidemia, gout, long-standing palpitations attributed to panic attacks, recently diagnosed atrial fibrillation. \par \par Patient presented to [facility] on May 11, 2023 for atrial fibrillation with rapid ventricular response. He had severe palpitations similar to prior palpitations since 2019 attributed to panic attacks. He was started on Diltiazem and converted spontaneously to sinus rhythm. \par \par Since discharge from the hospital, patient has been taking DOAC and Metoprolol. He had no symptoms of AF until last night when he had a brief episode that lasted 10 minutes. \par \par Patient has no chest pain, no shortness of breath at rest, no dyspnea on exertion, no lightheadedness, no dizziness, and no syncope. He presents for evaluation.

## 2023-06-25 NOTE — DISCUSSION/SUMMARY
[EKG obtained to assist in diagnosis and management of assessed problem(s)] : EKG obtained to assist in diagnosis and management of assessed problem(s) [FreeTextEntry1] : Mr. Thais Holcomb is a pleasant 73 year-old man with hypertension, hyperlipidemia, gout, paroxysmal atrial fibrillation, non-obstructive CAD, diffuse T wave inversion in precordial leads, and LVH on 2D echo.\par \par Patient was diagnosed with atrial fibrillation on May 11, 2023 during presentation to [facility] ER in AF. He is being treated with Metoprolol 25 mg twice a day and Eliquis 5 mg twice a day for stroke prevention. His CHADSVASC score is 3 (HTN, age, CAD). \par \par I recommend to continue Metoprolol 25 mg twice a day and Eliquis 5 mg twice a day for stroke prevention. \par \par I recommend a 4 week MCOT to assess for AF burden and correlate symptoms with arrhythmias.\par \par I recommend cardiac MRI to rule out apical HCM due to presence of left ventricular hypertrophy on echo and deep inverted T wave on ECG.\par \par I recommend evaluation for obstructive sleep apnea due to snoring and the presence of paroxysmal atrial fibrillation. Patient is scheduled to see Dr. Arrieta for evaluation of lung nodule. He  will discuss with him obtaining a sleep study to assess for MERLENE.\par \par The management strategies for atrial fibrillation were discussed focusing on the issues of stroke prevention, heart rate control and rhythm control. The options of rate control, antiarrhythmic drug therapy, and electrophysiologic testing and catheter ablation therapy were discussed at length. As he is not keen on long term antiarrhythmic medication, he is a good candidate for catheter ablation of atrial fibrillation in the form of pulmonary vein isolation. I have discussed the procedure with him in great detail. He was told this procedure is performed under anesthesia with the duration of about four hours. Possible complications include but not limited to bleeding, vascular injury, groin complications, cardiac tamponade, stroke, esophageal injury, pulmonary vein stenosis, need for pacemaker, need for cardiac surgery, and rare risks of esophageal fistula/stroke/heart attack/death. Intracardiac echo is used to monitor the procedure. In addition, we use a temperature probe in the esophagus to prevent lesions. The success rate is in the range 70-80%. About 20% of patients require a second procedure for pulmonary vein reconnection. \par \par Procedure will be planned on 9/6/2023 (Cryo PVI + RF CTI)\par \par He verbalized understanding of the discussion and all questions were addressed and answered. He expressed agreement in proceeding with EP study and ablation. My  will be in contact with her for finalizing date, preadmission testing and instructions. Patient will follow with me in 2 months’ time. Please do not hesitate to contact me at [phone] if you have any further questions regarding this patient care.

## 2023-08-03 ENCOUNTER — APPOINTMENT (OUTPATIENT)
Dept: PULMONOLOGY | Facility: CLINIC | Age: 73
End: 2023-08-03
Payer: COMMERCIAL

## 2023-08-03 ENCOUNTER — APPOINTMENT (OUTPATIENT)
Dept: CARDIOLOGY | Facility: CLINIC | Age: 73
End: 2023-08-03
Payer: COMMERCIAL

## 2023-08-03 VITALS
SYSTOLIC BLOOD PRESSURE: 132 MMHG | HEIGHT: 69 IN | BODY MASS INDEX: 30.36 KG/M2 | DIASTOLIC BLOOD PRESSURE: 68 MMHG | WEIGHT: 205 LBS | HEART RATE: 70 BPM | OXYGEN SATURATION: 98 %

## 2023-08-03 VITALS
WEIGHT: 214 LBS | BODY MASS INDEX: 32.43 KG/M2 | DIASTOLIC BLOOD PRESSURE: 82 MMHG | HEIGHT: 68 IN | SYSTOLIC BLOOD PRESSURE: 140 MMHG | TEMPERATURE: 97.1 F

## 2023-08-03 DIAGNOSIS — E66.9 OBESITY, UNSPECIFIED: ICD-10-CM

## 2023-08-03 PROCEDURE — 99214 OFFICE O/P EST MOD 30 MIN: CPT | Mod: 25

## 2023-08-03 PROCEDURE — 93000 ELECTROCARDIOGRAM COMPLETE: CPT

## 2023-08-03 PROCEDURE — 99213 OFFICE O/P EST LOW 20 MIN: CPT

## 2023-08-03 NOTE — ASSESSMENT
[FreeTextEntry1] : Assessment: MERLENE Obesity  PLAN: The patient is benefitting from the PAP device .Weight loss discussed I stressed the need maintain compliance  with the PAP device. The patient is not to use an Ozone or UV sterilizer.  F/U in 3months

## 2023-08-03 NOTE — REASON FOR VISIT
[Follow-Up] : a follow-up visit [TextBox_44] : Underwent testing has significant sleep apnea.  He received his CPAP about 2 weeks or so.  He has been working digit diligently to continue to use the machine.  He has some basic problems which we discussed.

## 2023-08-03 NOTE — ASSESSMENT
[FreeTextEntry1] : AF: [id number] 2. Currently in NSR.  -Pending CMR, AF ablation.  -Continue with CPAP.   MERLENE:  -Continue with CPAP and pulm follow up.   CAD: Non-obstructive on cardiac cath. Possible allergy to ASA.  -Continue toprol 50mg PO daily.  -Check CK and start rosuvastatin if level ok.  -Discontinue ASA use and plavix. Pt complains of itching to both.  -Will follow with interval stress testing.   HTN: BP near goal per ACC/AHA 2018 guidelines -Continue with Toprol 50mg PO daily.  -Referral to pulmonology for sleep study.  -Will trial increase in exercise for better control.   HLD: , , HDL 42,  (04/23, off statin) -Continue to hold statin.  - will check CK.   -If back to baseline, will start on rosuvastatin.  - Risk factors modification - Discussed therapeutic lifestyle changes to promote improved lipid metabolism  Prediabetes: HA1c 6.1 (02/17/22) -Discussed therapeutic lifestyle changes to promote improved insulin sensitivity.   Gout:  -Rheumatology referral given.  - Will repeat CK levels.  -Continue on Allopurinol.   GGO on CT: 1.3cm GGO in lung found on CT -Pulmonology referral.   Pancreatic head hypodensity: Found incidentally on CT - MRI was done, results are pending.   - Blood work prior to next visit (including CK)   Follow up in 3 months.

## 2023-08-03 NOTE — REASON FOR VISIT
[Other: ____] : [unfilled] [FreeTextEntry1] : Diagnostic Tests: -------------------- EK23-NSR. TWI in anterolateral leads.  23-NSR. LAE. TWI Anterolateral.  22-NSR. TWI in anterolateral leads.  22-NSR. TWI in anterolateral leads.  22-NSR. TWI in anterolateral leads.  21-NSR. TWI in anterolateral leads.  21-NSR. TWI in anterolateral leads.  -------------------- Echo: 23-TTE: EF 58%. Mild LVH. Moderate LAE. Mild MR, TR, AI.  21-TTE: EF 60-65%. Mild AI, MR, TR.  -------------------- CT:  21-CTA CAP: No evidence of aortic dissection or aneurysm. RLL 1.5cm GGO. Pancreatic head hypodensity 1.2cm. 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 Thyroid nodules.  -------------------- Cath:  21-LHC: LAD mild, LCx minor, OM1 minor, RCA minor.

## 2023-08-03 NOTE — REVIEW OF SYSTEMS
[Joint Pain] : joint pain [Joint Swelling] : joint swelling [Negative] : Heme/Lymph [SOB] : no shortness of breath [Chest Discomfort] : no chest discomfort [Joint Stiffness] : no joint stiffness [Myalgia] : no myalgia

## 2023-08-03 NOTE — HISTORY OF PRESENT ILLNESS
[address] [address]  Phone - [phone]      Chief Complaint:     Romelia Hoang is a 60 year old female called to discuss:    Chief Complaint   Patient presents with   • Md Call   • ER F/U     03/19/20 covid        1. COVID-19 virus infection        The patient understands this is a telephone visit and consents to the visit.    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 Has the patient been in close contact with a person known to have a positive test for COVID-19? Yes    2. Has the patient had any travel outside of the state of [place] or [place] in the last 14 days, if so, where?  No    History:     Past Medical History:   Diagnosis Date   • Hyperlipidemia    • Hypothyroid 2008    acquired, following treatment for hyperthyroidism       Social History     Socioeconomic History   • Marital status: Single     Spouse name: Not on file   • Number of children: Not on file   • Years of education: Not on file   • Highest education level: Not on file   Occupational History   • Not on file   Social Needs   • Financial resource strain: Not on file   • Food insecurity:     Worry: Not on file     Inability: Not on file   • Transportation needs:     Medical: Not on file     Non-medical: Not on file   Tobacco Use   • Smoking status: Never Smoker   • Smokeless tobacco: Never Used   Substance and Sexual Activity   • Alcohol use: No      Alcohol/week: 0.0 standard drinks     Comment: rare   • Drug use: No   • Sexual activity: Yes     Partners: Male     Birth control/protection: Surgical     Comment: BENTLEY-BSO   Lifestyle   • Physical activity:     Days per week: Not on file     Minutes per session: Not on file   • Stress: Not on file   Relationships   • Social connections:     Talks on phone: Not on file     Gets together: Not on file     Attends Gnosticism service: Not on file     Active member of club or organization: Not on file     Attends meetings of clubs or organizations: Not on file     Relationship status: Not on file   • Intimate partner violence:     Fear of current or ex partner: Not on file     Emotionally abused: Not on file     Physically abused: Not on file     Forced sexual activity: Not on file   Other Topics Concern   • Not on file   Social History Narrative    Patient works as a  and also in shipping       Family History   Problem Relation Age of Onset   • Hypertension Mother    • Diabetes Mother    • Heart Mother 76        MI   • Stroke Mother         started in her 60s   • Heart Father 85        MI   • Diabetes Father    • Cancer Maternal Aunt         breast   • Cancer Sister         breast   • Other Sister         liver failure   • Thyroid Sister          Review of Systems:     Negative except as noted above.     Assessment and Plan:   1. COVID-19 virus infection  - was tested on 3/18, was updated with positive Covid-19 result around 3/27  - patient self quarantined for 14 days  - given she is still having symptoms of SOB, weakness and the fact that she works caring for patients, she is not safe to return to work   - letter will be provided for her to be home for next 2 weeks and then reassess   - Fax letter to - [phone] Attn: Carmen     Follow up as needed       patient indicated understanding of the diagnosis and agreed with the plan of care.    Time spent in direct consultation with the patient was 11-20 minutes  [FreeTextEntry1] : Mr. Holcomb is a 74yo male with PMHx of non-obstructive CAD, HTN, HLD, prediabetes and gout. His PCP is Dr. Beckie Lilly. He was admitted to [facility] on 11/12/21 for evaluation of chest pain where he was found to have EKG with anterolateral TWI and negative troponins  Cardiac work up (CTA,TTE,LHC) showed normal LV function and non obstructive CAD.   He will have MRI to access cyst of pancreas revealed on CTA. Patient will see pulmonologist  for incidentally found lung nodules on CTA and rheumatology.  -Last gout attack about 1 month ago. Has been feeling better. Per wife, his diet still is not great. Denies CP, SOB, palpitations.  04/13/23-Patient has been holding atorvastatin due to body aches. Repeat bloodwork shows elevated cholesterol, CK level not checked. He is planning on increasing biking for exercise.  08/03/23-Patient had further palpitations which he went to ED for evaluation. Found to be in AF with RVR. He is following with Dr. Fonseca and will be going for AF ablation in September. He was diagnosed with MERLENE, trying to adjust to CPAP. He denies symptoms/complains.  occurring by telephone.    Francisco J Grewal MD  Family Practice

## 2023-08-03 NOTE — HISTORY OF PRESENT ILLNESS
[TextBox_4] : I reviewed all the previous sleep test that are available on file. I reviewed the previous office notes.

## 2023-08-17 ENCOUNTER — NON-APPOINTMENT (OUTPATIENT)
Age: 73
End: 2023-08-17

## 2023-08-21 LAB
ALBUMIN SERPL ELPH-MCNC: 4.5 G/DL
ALP BLD-CCNC: 81 U/L
ALT SERPL-CCNC: 60 U/L
ANION GAP SERPL CALC-SCNC: 9 MMOL/L
AST SERPL-CCNC: 40 U/L
BILIRUB SERPL-MCNC: 0.8 MG/DL
BUN SERPL-MCNC: 18 MG/DL
CALCIUM SERPL-MCNC: 9.4 MG/DL
CHLORIDE SERPL-SCNC: 107 MMOL/L
CHOLEST SERPL-MCNC: 130 MG/DL
CK SERPL-CCNC: 429 U/L
CO2 SERPL-SCNC: 25 MMOL/L
CREAT SERPL-MCNC: 1 MG/DL
EGFR: 80 ML/MIN/1.73M2
GLUCOSE SERPL-MCNC: 120 MG/DL
HDLC SERPL-MCNC: 42 MG/DL
LDLC SERPL CALC-MCNC: 66 MG/DL
NONHDLC SERPL-MCNC: 88 MG/DL
POTASSIUM SERPL-SCNC: 4.6 MMOL/L
PROT SERPL-MCNC: 6.9 G/DL
SODIUM SERPL-SCNC: 141 MMOL/L
TRIGL SERPL-MCNC: 112 MG/DL

## 2023-08-23 ENCOUNTER — OUTPATIENT (OUTPATIENT)
Dept: OUTPATIENT SERVICES | Facility: HOSPITAL | Age: 73
LOS: 1 days | End: 2023-08-23
Payer: COMMERCIAL

## 2023-08-23 VITALS
RESPIRATION RATE: 20 BRPM | SYSTOLIC BLOOD PRESSURE: 146 MMHG | DIASTOLIC BLOOD PRESSURE: 82 MMHG | HEIGHT: 68 IN | WEIGHT: 220.9 LBS | TEMPERATURE: 97 F | OXYGEN SATURATION: 100 % | HEART RATE: 75 BPM

## 2023-08-23 DIAGNOSIS — Z01.818 ENCOUNTER FOR OTHER PREPROCEDURAL EXAMINATION: ICD-10-CM

## 2023-08-23 DIAGNOSIS — Z98.890 OTHER SPECIFIED POSTPROCEDURAL STATES: Chronic | ICD-10-CM

## 2023-08-23 DIAGNOSIS — I48.19 OTHER PERSISTENT ATRIAL FIBRILLATION: ICD-10-CM

## 2023-08-23 LAB
ALBUMIN SERPL ELPH-MCNC: 4.6 G/DL — SIGNIFICANT CHANGE UP (ref 3.5–5.2)
ALP SERPL-CCNC: 80 U/L — SIGNIFICANT CHANGE UP (ref 30–115)
ALT FLD-CCNC: 61 U/L — HIGH (ref 0–41)
ANION GAP SERPL CALC-SCNC: 11 MMOL/L — SIGNIFICANT CHANGE UP (ref 7–14)
APTT BLD: 38.6 SEC — SIGNIFICANT CHANGE UP (ref 27–39.2)
AST SERPL-CCNC: 39 U/L — SIGNIFICANT CHANGE UP (ref 0–41)
BASOPHILS # BLD AUTO: 0.07 K/UL — SIGNIFICANT CHANGE UP (ref 0–0.2)
BASOPHILS NFR BLD AUTO: 0.9 % — SIGNIFICANT CHANGE UP (ref 0–1)
BILIRUB SERPL-MCNC: 0.7 MG/DL — SIGNIFICANT CHANGE UP (ref 0.2–1.2)
BLD GP AB SCN SERPL QL: SIGNIFICANT CHANGE UP
BUN SERPL-MCNC: 19 MG/DL — SIGNIFICANT CHANGE UP (ref 10–20)
CALCIUM SERPL-MCNC: 9.5 MG/DL — SIGNIFICANT CHANGE UP (ref 8.4–10.5)
CHLORIDE SERPL-SCNC: 105 MMOL/L — SIGNIFICANT CHANGE UP (ref 98–110)
CO2 SERPL-SCNC: 23 MMOL/L — SIGNIFICANT CHANGE UP (ref 17–32)
CREAT SERPL-MCNC: 1 MG/DL — SIGNIFICANT CHANGE UP (ref 0.7–1.5)
EGFR: 80 ML/MIN/1.73M2 — SIGNIFICANT CHANGE UP
EOSINOPHIL # BLD AUTO: 0.24 K/UL — SIGNIFICANT CHANGE UP (ref 0–0.7)
EOSINOPHIL NFR BLD AUTO: 3.1 % — SIGNIFICANT CHANGE UP (ref 0–8)
GLUCOSE SERPL-MCNC: 97 MG/DL — SIGNIFICANT CHANGE UP (ref 70–99)
HCT VFR BLD CALC: 49 % — SIGNIFICANT CHANGE UP (ref 42–52)
HGB BLD-MCNC: 16.4 G/DL — SIGNIFICANT CHANGE UP (ref 14–18)
IMM GRANULOCYTES NFR BLD AUTO: 0.3 % — SIGNIFICANT CHANGE UP (ref 0.1–0.3)
INR BLD: 1.24 RATIO — SIGNIFICANT CHANGE UP (ref 0.65–1.3)
LYMPHOCYTES # BLD AUTO: 2.73 K/UL — SIGNIFICANT CHANGE UP (ref 1.2–3.4)
LYMPHOCYTES # BLD AUTO: 35.7 % — SIGNIFICANT CHANGE UP (ref 20.5–51.1)
MCHC RBC-ENTMCNC: 32 PG — HIGH (ref 27–31)
MCHC RBC-ENTMCNC: 33.5 G/DL — SIGNIFICANT CHANGE UP (ref 32–37)
MCV RBC AUTO: 95.5 FL — HIGH (ref 80–94)
MONOCYTES # BLD AUTO: 0.75 K/UL — HIGH (ref 0.1–0.6)
MONOCYTES NFR BLD AUTO: 9.8 % — HIGH (ref 1.7–9.3)
NEUTROPHILS # BLD AUTO: 3.84 K/UL — SIGNIFICANT CHANGE UP (ref 1.4–6.5)
NEUTROPHILS NFR BLD AUTO: 50.2 % — SIGNIFICANT CHANGE UP (ref 42.2–75.2)
NRBC # BLD: 0 /100 WBCS — SIGNIFICANT CHANGE UP (ref 0–0)
PLATELET # BLD AUTO: 150 K/UL — SIGNIFICANT CHANGE UP (ref 130–400)
PMV BLD: 11.7 FL — HIGH (ref 7.4–10.4)
POTASSIUM SERPL-MCNC: 4.3 MMOL/L — SIGNIFICANT CHANGE UP (ref 3.5–5)
POTASSIUM SERPL-SCNC: 4.3 MMOL/L — SIGNIFICANT CHANGE UP (ref 3.5–5)
PROT SERPL-MCNC: 7.1 G/DL — SIGNIFICANT CHANGE UP (ref 6–8)
PROTHROM AB SERPL-ACNC: 14.2 SEC — HIGH (ref 9.95–12.87)
RBC # BLD: 5.13 M/UL — SIGNIFICANT CHANGE UP (ref 4.7–6.1)
RBC # FLD: 12.9 % — SIGNIFICANT CHANGE UP (ref 11.5–14.5)
SODIUM SERPL-SCNC: 139 MMOL/L — SIGNIFICANT CHANGE UP (ref 135–146)
WBC # BLD: 7.65 K/UL — SIGNIFICANT CHANGE UP (ref 4.8–10.8)
WBC # FLD AUTO: 7.65 K/UL — SIGNIFICANT CHANGE UP (ref 4.8–10.8)

## 2023-08-23 PROCEDURE — 85025 COMPLETE CBC W/AUTO DIFF WBC: CPT

## 2023-08-23 PROCEDURE — 36415 COLL VENOUS BLD VENIPUNCTURE: CPT

## 2023-08-23 PROCEDURE — 86850 RBC ANTIBODY SCREEN: CPT

## 2023-08-23 PROCEDURE — 85730 THROMBOPLASTIN TIME PARTIAL: CPT

## 2023-08-23 PROCEDURE — 85610 PROTHROMBIN TIME: CPT

## 2023-08-23 PROCEDURE — 86901 BLOOD TYPING SEROLOGIC RH(D): CPT

## 2023-08-23 PROCEDURE — 86900 BLOOD TYPING SEROLOGIC ABO: CPT

## 2023-08-23 PROCEDURE — 99214 OFFICE O/P EST MOD 30 MIN: CPT | Mod: 25

## 2023-08-23 PROCEDURE — 80053 COMPREHEN METABOLIC PANEL: CPT

## 2023-08-23 NOTE — H&P PST ADULT - NSICDXPASTMEDICALHX_GEN_ALL_CORE_FT
PAST MEDICAL HISTORY:  Atrial fibrillation and flutter     Elevated liver enzymes     Glaucoma     Gout     High cholesterol     Hypertension     Obstructive sleep apnea

## 2023-08-23 NOTE — H&P PST ADULT - HISTORY OF PRESENT ILLNESS
74 yo m here for past. pt sched for  ep study, mapping  on 9/6/2023 in eps under GA    Pt reports no cardiopulmonary issues denies sob/carr/cp/palpitations. Pt states no recent infections no fever no cough no uti uri. Stated exercise tolerance is   1  flights no changes. Vinay screen revd.  pt verbalized and understanding of instructions  given and all concerns and questions asked and answered.  Pt denies any s/s covid 19 and reports no contact with known positive people. Pt has appointment for repeat covid testing pre op and instructed to continue to self monitor and report any concerns to MD. Pt will continue to practice self isolation and  exposure control measures pre op  Anesthesia Alert  NO--Difficult Airway  NO--History of neck surgery or radiation  NO--Limited ROM of neck  NO--History of Malignant hyperthermia  NO--No personal or family history of Pseudocholinesterase deficiency.  NO--Prior Anesthesia Complication  NO--Latex Allergy  NO--Loose teeth  NO--History of Rheumatoid Arthritis  +--VINAY  NO--Other_____  written and verbal instructions with teach back on chlorhexidine shampoo provided,  pt verbalized understanding with returned demonstration  PT DENIES ANY RASHES, ABRASION, OR OPEN WOUNDS OR CUTS  denies travel outside the [place] in the past 30 days

## 2023-08-24 DIAGNOSIS — Z01.818 ENCOUNTER FOR OTHER PREPROCEDURAL EXAMINATION: ICD-10-CM

## 2023-08-24 DIAGNOSIS — I48.19 OTHER PERSISTENT ATRIAL FIBRILLATION: ICD-10-CM

## 2023-08-31 ENCOUNTER — OUTPATIENT (OUTPATIENT)
Dept: OUTPATIENT SERVICES | Facility: HOSPITAL | Age: 73
LOS: 1 days | End: 2023-08-31
Payer: COMMERCIAL

## 2023-08-31 DIAGNOSIS — R94.31 ABNORMAL ELECTROCARDIOGRAM [ECG] [EKG]: ICD-10-CM

## 2023-08-31 DIAGNOSIS — Z98.890 OTHER SPECIFIED POSTPROCEDURAL STATES: Chronic | ICD-10-CM

## 2023-08-31 PROCEDURE — A9579: CPT

## 2023-08-31 PROCEDURE — 75561 CARDIAC MRI FOR MORPH W/DYE: CPT

## 2023-08-31 PROCEDURE — 75561 CARDIAC MRI FOR MORPH W/DYE: CPT | Mod: 26

## 2023-09-01 DIAGNOSIS — R94.31 ABNORMAL ELECTROCARDIOGRAM [ECG] [EKG]: ICD-10-CM

## 2023-09-06 ENCOUNTER — APPOINTMENT (OUTPATIENT)
Dept: ELECTROPHYSIOLOGY | Facility: HOSPITAL | Age: 73
End: 2023-09-06

## 2023-09-06 ENCOUNTER — INPATIENT (INPATIENT)
Facility: HOSPITAL | Age: 73
LOS: 0 days | Discharge: HOME | DRG: 274 | End: 2023-09-07
Attending: INTERNAL MEDICINE | Admitting: STUDENT IN AN ORGANIZED HEALTH CARE EDUCATION/TRAINING PROGRAM
Payer: COMMERCIAL

## 2023-09-06 ENCOUNTER — TRANSCRIPTION ENCOUNTER (OUTPATIENT)
Age: 73
End: 2023-09-06

## 2023-09-06 VITALS — WEIGHT: 210.1 LBS | HEIGHT: 69 IN

## 2023-09-06 DIAGNOSIS — Z98.890 OTHER SPECIFIED POSTPROCEDURAL STATES: Chronic | ICD-10-CM

## 2023-09-06 DIAGNOSIS — I48.19 OTHER PERSISTENT ATRIAL FIBRILLATION: ICD-10-CM

## 2023-09-06 PROCEDURE — 76937 US GUIDE VASCULAR ACCESS: CPT | Mod: 26

## 2023-09-06 PROCEDURE — 93306 TTE W/DOPPLER COMPLETE: CPT | Mod: 26

## 2023-09-06 PROCEDURE — C9399: CPT

## 2023-09-06 PROCEDURE — 93312 ECHO TRANSESOPHAGEAL: CPT | Mod: 26

## 2023-09-06 PROCEDURE — 93655 ICAR CATH ABLTJ DSCRT ARRHYT: CPT

## 2023-09-06 PROCEDURE — 93005 ELECTROCARDIOGRAM TRACING: CPT

## 2023-09-06 PROCEDURE — 93623 PRGRMD STIMJ&PACG IV RX NFS: CPT | Mod: 26

## 2023-09-06 PROCEDURE — 93656 COMPRE EP EVAL ABLTJ ATR FIB: CPT

## 2023-09-06 PROCEDURE — 93325 DOPPLER ECHO COLOR FLOW MAPG: CPT | Mod: 26,59

## 2023-09-06 RX ORDER — METOPROLOL TARTRATE 50 MG
1 TABLET ORAL
Qty: 0 | Refills: 0 | DISCHARGE
Start: 2023-09-06

## 2023-09-06 RX ORDER — ACETAMINOPHEN 500 MG
650 TABLET ORAL ONCE
Refills: 0 | Status: COMPLETED | OUTPATIENT
Start: 2023-09-06 | End: 2023-09-06

## 2023-09-06 RX ORDER — BRIMONIDINE TARTRATE 2 MG/MG
1 SOLUTION/ DROPS OPHTHALMIC DAILY
Refills: 0 | Status: DISCONTINUED | OUTPATIENT
Start: 2023-09-06 | End: 2023-09-07

## 2023-09-06 RX ORDER — ALLOPURINOL 300 MG
100 TABLET ORAL DAILY
Refills: 0 | Status: DISCONTINUED | OUTPATIENT
Start: 2023-09-06 | End: 2023-09-07

## 2023-09-06 RX ORDER — METOPROLOL TARTRATE 50 MG
25 TABLET ORAL
Refills: 0 | Status: DISCONTINUED | OUTPATIENT
Start: 2023-09-06 | End: 2023-09-07

## 2023-09-06 RX ORDER — LATANOPROST 0.05 MG/ML
1 SOLUTION/ DROPS OPHTHALMIC; TOPICAL AT BEDTIME
Refills: 0 | Status: DISCONTINUED | OUTPATIENT
Start: 2023-09-06 | End: 2023-09-07

## 2023-09-06 RX ORDER — ALLOPURINOL 300 MG
1 TABLET ORAL
Qty: 0 | Refills: 0 | DISCHARGE
Start: 2023-09-06

## 2023-09-06 RX ORDER — ALLOPURINOL 300 MG
1 TABLET ORAL
Qty: 0 | Refills: 0 | DISCHARGE

## 2023-09-06 RX ORDER — OMEGA-3 ACID ETHYL ESTERS 1 G
2 CAPSULE ORAL
Refills: 0 | Status: DISCONTINUED | OUTPATIENT
Start: 2023-09-06 | End: 2023-09-07

## 2023-09-06 RX ORDER — TIMOLOL 0.5 %
1 DROPS OPHTHALMIC (EYE)
Refills: 0 | Status: DISCONTINUED | OUTPATIENT
Start: 2023-09-06 | End: 2023-09-07

## 2023-09-06 RX ORDER — APIXABAN 2.5 MG/1
5 TABLET, FILM COATED ORAL
Refills: 0 | Status: DISCONTINUED | OUTPATIENT
Start: 2023-09-06 | End: 2023-09-07

## 2023-09-06 RX ORDER — FAMOTIDINE 10 MG/ML
20 INJECTION INTRAVENOUS DAILY
Refills: 0 | Status: DISCONTINUED | OUTPATIENT
Start: 2023-09-06 | End: 2023-09-07

## 2023-09-06 RX ORDER — DORZOLAMIDE HYDROCHLORIDE 20 MG/ML
1 SOLUTION/ DROPS OPHTHALMIC THREE TIMES A DAY
Refills: 0 | Status: DISCONTINUED | OUTPATIENT
Start: 2023-09-06 | End: 2023-09-07

## 2023-09-06 RX ADMIN — Medication 1 DROP(S): at 18:32

## 2023-09-06 RX ADMIN — Medication 650 MILLIGRAM(S): at 21:26

## 2023-09-06 RX ADMIN — LATANOPROST 1 DROP(S): 0.05 SOLUTION/ DROPS OPHTHALMIC; TOPICAL at 21:59

## 2023-09-06 RX ADMIN — Medication 25 MILLIGRAM(S): at 18:31

## 2023-09-06 RX ADMIN — DORZOLAMIDE HYDROCHLORIDE 1 DROP(S): 20 SOLUTION/ DROPS OPHTHALMIC at 21:59

## 2023-09-06 RX ADMIN — APIXABAN 5 MILLIGRAM(S): 2.5 TABLET, FILM COATED ORAL at 13:40

## 2023-09-06 RX ADMIN — Medication 650 MILLIGRAM(S): at 22:03

## 2023-09-06 NOTE — DISCHARGE NOTE PROVIDER - NSDCCPTREATMENT_GEN_ALL_CORE_FT
PRINCIPAL PROCEDURE  Procedure: Pulmonary vein isolation for atrial fibrillation  Findings and Treatment: - Continue Eliquis   - Do NOT stop blood thinners unless discussed with doctor  - No heavy lifting > 10 lbs, squatting, or exertional activities 1-2 weeks  - Can take a shower starting today  - No submerging in water for 1 week  - No driving for 5 days  - Fu in office in one month with

## 2023-09-06 NOTE — CHART NOTE - NSCHARTNOTEFT_GEN_A_CORE
Electrophysiology Brief Post-Op Note      I have personally seen and examined the patient.  I agree with the history and physical which I have reviewed and noted any changes below.    PRE-OP DIAGNOSIS: Atrial Fibrillation    POST-OP DIAGNOSIS: Atrial Fibrillation    PROCEDURE:  -Transeophageal Echocardiogram  -Transeptal Puncture  -3D Mapping  -Use of intracardiac echocardiography  -Pulmonary Veins Isolation (Cryoablation)  -RF ablation of CTI isthmus      Vascular Access used (using Ultrasound Guidance)  -Right Femoral Vein: 14F  -Left Femoral Vein: 11F 8F 6F  -Right Femoral Artery: none    All sheaths and wires removed and Perclose sutures applied in both groins.    Physician: Raymundo  Assistant: None    ANESTHESIA TYPE:  [X]General Anesthesia  [  ] Sedation  [  ] Local/Regional      CONDITION  [  ] Critical  [  ] Serious  [  ]Fair  [X]Good      SPECIMENS REMOVED (IF APPLICABLE): NONE      IMPLANTS (IF APPLICABLE): NONE      FINDINGS (see below)  -Successful Pulmonary Veins Isolation with entrance and exit block  -Successful RF ablation of CTI isthmus  -No pericardial effusion on ICE  -ESTIMATED BLOOD LOSS:  15 mL  -CONTRAST USED: 15      PLAN OF CARE  -	Start Eliquis 5 mg q12 today (continuous schedule)  -	Bed rest 4 hours  -	Admit to telemetry

## 2023-09-06 NOTE — DISCHARGE NOTE PROVIDER - ATTENDING DISCHARGE PHYSICAL EXAMINATION:
Physical Exam  T(C): 36.7 (09-07-23 @ 07:00), Max: 37 (09-07-23 @ 00:00)  HR: 83 (09-07-23 @ 07:00) (83 - 106)  BP: 118/60 (09-07-23 @ 07:00) (118/60 - 143/81)  RR: 20 (09-07-23 @ 07:00) (19 - 20)  SpO2: 92% (09-07-23 @ 07:00) (92% - 94%)  Gen: NAD  CV: RRR, nl S1 and S2, no m/r/g, no LE edema, no JVD  Pulm: CTAB, no crackles  GI: soft, nontender  MSK: normal ROM  Extremities: warm  Neuro: A+Ox3  Psych: cooperative

## 2023-09-06 NOTE — DISCHARGE NOTE PROVIDER - PROVIDER TOKENS
PROVIDER:[TOKEN:[[id number]],FOLLOWUP:[1 month]] PROVIDER:[TOKEN:[[id number]],SCHEDULEDAPPT:[09/26/2023]]

## 2023-09-06 NOTE — DISCHARGE NOTE PROVIDER - NSDCFUSCHEDAPPT_GEN_ALL_CORE_FT
Edward Fonseca  [facility]  ELECTROPH [address]  Scheduled Appointment: 09/26/2023    Landen Arrieta  [facility]  PULMMED [address]  Scheduled Appointment: 11/06/2023    Terrell Heard  [facility]  CARDIOLOGY [address]  Scheduled Appointment: 11/16/2023

## 2023-09-06 NOTE — PATIENT PROFILE ADULT - FALL HARM RISK - HARM RISK INTERVENTIONS

## 2023-09-06 NOTE — DISCHARGE NOTE PROVIDER - NSDCMRMEDTOKEN_GEN_ALL_CORE_FT
allopurinol 100 mg oral tablet: 1 tab(s) orally once a day  brimonidine 0.2% ophthalmic solution: 1 drop(s) to each affected eye once a day  dorzolamide 2% ophthalmic solution: 1 drop(s) to each affected eye 3 times a day  Eliquis 5 mg oral tablet: 1 tab(s) orally every 12 hours  latanoprost 0.005% ophthalmic solution: 1 drop(s) to each affected eye once a day (at bedtime)  metoprolol tartrate 25 mg oral tablet: 1 tab(s) orally 2 times a day  omega-3 polyunsaturated fatty acids ethyl esters 1000 mg oral capsule: 2 cap(s) orally 2 times a day  timolol maleate 0.5% ophthalmic solution: 1 drop(s) to each affected eye 2 times a day   allopurinol 100 mg oral tablet: 1 tab(s) orally once a day  brimonidine 0.2% ophthalmic solution: 1 drop(s) to each affected eye once a day  dorzolamide 2% ophthalmic solution: 1 drop(s) to each affected eye 3 times a day  Eliquis 5 mg oral tablet: 1 tab(s) orally every 12 hours  latanoprost 0.005% ophthalmic solution: 1 drop(s) to each affected eye once a day (at bedtime)  metoprolol tartrate 25 mg oral tablet: 1 tab(s) orally 2 times a day  omega-3 polyunsaturated fatty acids ethyl esters 1000 mg oral capsule: 2 cap(s) orally 2 times a day  Protonix 40 mg oral delayed release tablet: 1 tab(s) orally once a day  timolol maleate 0.5% ophthalmic solution: 1 drop(s) to each affected eye 2 times a day

## 2023-09-06 NOTE — DISCHARGE NOTE PROVIDER - HOSPITAL COURSE
73 M hx of HTN, HLD, gout, long-standing palpitations attributed to panic attacks, recently diagnosed atrial fibrillation,  non obstructive CAD cath ( 11/2021),  EF 58% (echo 5/11/23), presented today for EP study, mapping. Patient initially presented to [facility] on May 11, 2023 for atrial fibrillation with rapid ventricular response. He had severe palpitations similar to prior palpitations since 2019 attributed to panic attacks. He was started on Diltiazem and converted spontaneously to sinus rhythm. Since discharge from the hospital, patient has been taking DOAC and Metoprolol. On 9/6/23, patient successfully underwent PVI (cryoablation) and RF ablation of CTI isthmus. Patient was monitored overnight. On POD 1 patient remains HD stable with no complaints. Patient remains in SR with no arrhythmias noted on tele. Examination of B/L common femoral venous access sites reveal a Clear and dry area with no hematoma or erythema. Patient should continue Eliquis for thromboembolic prophylaxis. Patient is being DC home in stable condition. 73 M hx of HTN, HLD, gout, long-standing palpitations attributed to panic attacks, recently diagnosed atrial fibrillation,  apical HCM, non obstructive CAD cath ( 11/2021),  EF 58% (echo 5/11/23), presented today for EP study, mapping. Patient initially presented to [facility] on May 11, 2023 for atrial fibrillation with rapid ventricular response. He had severe palpitations similar to prior palpitations since 2019 attributed to panic attacks. He was started on Diltiazem and converted spontaneously to sinus rhythm. Since discharge from the hospital, patient has been taking DOAC and Metoprolol. On 9/6/23, patient successfully underwent PVI (cryoablation) and RF ablation of CTI isthmus. Patient was monitored overnight. On POD 1 patient remains HD stable with no complaints. Patient remains in SR with no arrhythmias noted on tele. Examination of B/L common femoral venous access sites reveal a Clear and dry area with no hematoma or erythema. Patient should continue Eliquis for thromboembolic prophylaxis. Patient is being DC home in stable condition. Recommended family screening for HCM.

## 2023-09-06 NOTE — DISCHARGE NOTE PROVIDER - CARE PROVIDER_API CALL
Edward Fonseca  Cardiovascular Disease  [address] [place] [id number]  Phone: [phone]  Fax: [phone]  Follow Up Time: 1 month   Edward Fonseca  Cardiovascular Disease  [address] [place] [id number]  Phone: [phone]  Fax: [phone]  Scheduled Appointment: 09/26/2023

## 2023-09-06 NOTE — ASU PATIENT PROFILE, ADULT - ANESTHESIA, PREVIOUS REACTION, PROFILE
Reason for Call:  Other call back    Detailed comments: Pt's mother called the clinic back (specifically for Nuha Krueger). Pt's mother was left a message to give a TC a phone call back. Please give pt's mother a call back tomorrow. Thank you.    Phone Number Patient can be reached at: Home number on file [phone] (home)    Best Time:     Can we leave a detailed message on this number? YES    Call taken on 7/13/2017 at 5:11 PM by Sangita Brannon      
See 7/12/17 refill encounter. Spoke with mom and relayed message that rx is at  for pick-up.   Socorro Galvin RN   [facility] - Triage     
none

## 2023-09-07 ENCOUNTER — TRANSCRIPTION ENCOUNTER (OUTPATIENT)
Age: 73
End: 2023-09-07

## 2023-09-07 VITALS
OXYGEN SATURATION: 93 % | RESPIRATION RATE: 21 BRPM | SYSTOLIC BLOOD PRESSURE: 145 MMHG | TEMPERATURE: 98 F | HEART RATE: 96 BPM | DIASTOLIC BLOOD PRESSURE: 80 MMHG

## 2023-09-07 PROCEDURE — 99232 SBSQ HOSP IP/OBS MODERATE 35: CPT

## 2023-09-07 PROCEDURE — 93010 ELECTROCARDIOGRAM REPORT: CPT

## 2023-09-07 PROCEDURE — 99233 SBSQ HOSP IP/OBS HIGH 50: CPT

## 2023-09-07 RX ORDER — PANTOPRAZOLE SODIUM 20 MG/1
1 TABLET, DELAYED RELEASE ORAL
Qty: 30 | Refills: 0
Start: 2023-09-07 | End: 2023-10-06

## 2023-09-07 RX ORDER — ACETAMINOPHEN 500 MG
650 TABLET ORAL EVERY 6 HOURS
Refills: 0 | Status: DISCONTINUED | OUTPATIENT
Start: 2023-09-07 | End: 2023-09-07

## 2023-09-07 RX ADMIN — APIXABAN 5 MILLIGRAM(S): 2.5 TABLET, FILM COATED ORAL at 05:42

## 2023-09-07 RX ADMIN — DORZOLAMIDE HYDROCHLORIDE 1 DROP(S): 20 SOLUTION/ DROPS OPHTHALMIC at 05:42

## 2023-09-07 RX ADMIN — Medication 25 MILLIGRAM(S): at 05:41

## 2023-09-07 RX ADMIN — Medication 650 MILLIGRAM(S): at 05:42

## 2023-09-07 RX ADMIN — DORZOLAMIDE HYDROCHLORIDE 1 DROP(S): 20 SOLUTION/ DROPS OPHTHALMIC at 13:41

## 2023-09-07 RX ADMIN — Medication 650 MILLIGRAM(S): at 05:44

## 2023-09-07 RX ADMIN — Medication 1 DROP(S): at 05:42

## 2023-09-07 RX ADMIN — Medication 100 MILLIGRAM(S): at 11:54

## 2023-09-07 RX ADMIN — FAMOTIDINE 104 MILLIGRAM(S): 10 INJECTION INTRAVENOUS at 11:53

## 2023-09-07 RX ADMIN — BRIMONIDINE TARTRATE 1 DROP(S): 2 SOLUTION/ DROPS OPHTHALMIC at 16:28

## 2023-09-07 NOTE — DISCHARGE NOTE NURSING/CASE MANAGEMENT/SOCIAL WORK - PATIENT PORTAL LINK FT
Debridement Text: The wound edges were debrided prior to proceeding with the closure to facilitate wound healing. You can access the FollowMyHealth Patient Portal offered by [facility] by registering at the following website: [email or web address]. By joining [facility]’s FollowMyHealth portal, you will also be able to view your health information using other applications (apps) compatible with our system.

## 2023-09-07 NOTE — DISCHARGE NOTE NURSING/CASE MANAGEMENT/SOCIAL WORK - NSDCPETBCESMAN_GEN_ALL_CORE
Dr. Patiño If you are a smoker, it is important for your health to stop smoking. Please be aware that second hand smoke is also harmful.

## 2023-09-07 NOTE — DISCHARGE NOTE NURSING/CASE MANAGEMENT/SOCIAL WORK - NSDCPEFALRISK_GEN_ALL_CORE
For information on Fall & Injury Prevention, visit: [email or web address] OR  [email or web address] OR  https://www.cdc.gov/steadi/patient.html

## 2023-09-07 NOTE — PROGRESS NOTE ADULT - SUBJECTIVE AND OBJECTIVE BOX
INTERVAL HPI/OVERNIGHT EVENTS:  No acute events overnight, mild oozing from R groin now resolved  Patient SP AF Ablation POD#1  HD stable and feeling well    MEDICATIONS  (STANDING):  allopurinol 100 milliGRAM(s) Oral daily  apixaban 5 milliGRAM(s) Oral two times a day  brimonidine 0.2% Ophthalmic Solution 1 Drop(s) Both EYES daily  dorzolamide 2% Ophthalmic Solution 1 Drop(s) Both EYES three times a day  famotidine  IVPB 20 milliGRAM(s) IV Intermittent daily  latanoprost 0.005% Ophthalmic Solution 1 Drop(s) Both EYES at bedtime  metoprolol tartrate 25 milliGRAM(s) Oral two times a day  omega-3-Acid Ethyl Esters 2 Gram(s) Oral two times a day  timolol 0.5% Solution 1 Drop(s) Both EYES two times a day    MEDICATIONS  (PRN):  acetaminophen     Tablet .. 650 milliGRAM(s) Oral every 6 hours PRN Mild Pain (1 - 3)      Allergies    No Known Allergies    Intolerances        REVIEW OF SYSTEMS: No CP, palpitations, dizziness or SOB    Vital Signs Last 24 Hrs  T(C): 36.7 (07 Sep 2023 07:00), Max: 37 (07 Sep 2023 00:00)  T(F): 98.1 (07 Sep 2023 07:00), Max: 98.6 (07 Sep 2023 00:00)  HR: 83 (07 Sep 2023 07:00) (83 - 106)  BP: 118/60 (07 Sep 2023 07:00) (118/60 - 143/81)  BP(mean): 82 (07 Sep 2023 07:00) (82 - 98)  RR: 20 (07 Sep 2023 07:00) (19 - 20)  SpO2: 92% (07 Sep 2023 07:00) (92% - 94%)    Parameters below as of 07 Sep 2023 07:00  Patient On (Oxygen Delivery Method): room air        09-06-23 @ 07:01  -  09-07-23 @ 07:00  --------------------------------------------------------  IN: 300 mL / OUT: 0 mL / NET: 300 mL        Physical Exam  GENERAL: In no apparent distress, well nourished, and hydrated.  EYES: EOMI, PERRLA, conjunctiva and sclera clear  NECK: Supple  HEART: Regular rate and rhythm; No murmurs, rubs, or gallops.  PULMONARY: Clear to auscultation and perfusion.  No rales, wheezing, or rhonchi bilaterally.  EXTREMITIES:  2+ Peripheral Pulses, No clubbing, cyanosis, or edema  SKIN: Groins healing well BL, no hematoma  NEUROLOGICAL: Grossly nonfocal    LABS:                RADIOLOGY & ADDITIONAL TESTS:

## 2023-09-10 DIAGNOSIS — I48.0 PAROXYSMAL ATRIAL FIBRILLATION: ICD-10-CM

## 2023-09-10 DIAGNOSIS — I25.10 ATHEROSCLEROTIC HEART DISEASE OF NATIVE CORONARY ARTERY WITHOUT ANGINA PECTORIS: ICD-10-CM

## 2023-09-10 DIAGNOSIS — M10.9 GOUT, UNSPECIFIED: ICD-10-CM

## 2023-09-10 DIAGNOSIS — R91.1 SOLITARY PULMONARY NODULE: ICD-10-CM

## 2023-09-10 DIAGNOSIS — R06.83 SNORING: ICD-10-CM

## 2023-09-10 DIAGNOSIS — Z79.01 LONG TERM (CURRENT) USE OF ANTICOAGULANTS: ICD-10-CM

## 2023-09-10 DIAGNOSIS — I10 ESSENTIAL (PRIMARY) HYPERTENSION: ICD-10-CM

## 2023-09-10 DIAGNOSIS — E78.00 PURE HYPERCHOLESTEROLEMIA, UNSPECIFIED: ICD-10-CM

## 2023-09-26 ENCOUNTER — APPOINTMENT (OUTPATIENT)
Dept: ELECTROPHYSIOLOGY | Facility: CLINIC | Age: 73
End: 2023-09-26
Payer: COMMERCIAL

## 2023-09-26 PROBLEM — R74.8 ABNORMAL LEVELS OF OTHER SERUM ENZYMES: Chronic | Status: ACTIVE | Noted: 2023-08-23

## 2023-09-26 PROBLEM — G47.33 OBSTRUCTIVE SLEEP APNEA (ADULT) (PEDIATRIC): Chronic | Status: ACTIVE | Noted: 2023-08-23

## 2023-09-26 PROBLEM — H40.9 UNSPECIFIED GLAUCOMA: Chronic | Status: ACTIVE | Noted: 2023-08-23

## 2023-09-26 PROBLEM — I48.92 UNSPECIFIED ATRIAL FLUTTER: Chronic | Status: ACTIVE | Noted: 2023-08-23

## 2023-09-26 PROCEDURE — 99215 OFFICE O/P EST HI 40 MIN: CPT | Mod: 25

## 2023-09-26 PROCEDURE — 93000 ELECTROCARDIOGRAM COMPLETE: CPT | Mod: 59

## 2023-09-26 RX ORDER — ASPIRIN/SOD BICARB/CITRIC ACID 324 MG
TABLET, EFFERVESCENT ORAL DAILY
Refills: 0 | Status: ACTIVE | COMMUNITY

## 2023-09-26 RX ORDER — ASCORBIC ACID 125 MG
TABLET,CHEWABLE ORAL DAILY
Refills: 0 | Status: ACTIVE | COMMUNITY

## 2023-09-26 RX ORDER — ALLOPURINOL 100 MG/1
100 TABLET ORAL
Qty: 180 | Refills: 0 | Status: ACTIVE | COMMUNITY
Start: 2022-08-29

## 2023-09-26 RX ORDER — ATORVASTATIN CALCIUM 40 MG/1
40 TABLET, FILM COATED ORAL
Refills: 0 | Status: COMPLETED | COMMUNITY
End: 2023-09-26

## 2023-10-02 ENCOUNTER — RX RENEWAL (OUTPATIENT)
Age: 73
End: 2023-10-02

## 2023-11-06 ENCOUNTER — APPOINTMENT (OUTPATIENT)
Dept: PULMONOLOGY | Facility: CLINIC | Age: 73
End: 2023-11-06

## 2023-11-16 ENCOUNTER — APPOINTMENT (OUTPATIENT)
Dept: CARDIOLOGY | Facility: CLINIC | Age: 73
End: 2023-11-16
Payer: COMMERCIAL

## 2023-11-16 VITALS
WEIGHT: 208 LBS | DIASTOLIC BLOOD PRESSURE: 72 MMHG | HEIGHT: 69 IN | SYSTOLIC BLOOD PRESSURE: 124 MMHG | BODY MASS INDEX: 30.81 KG/M2

## 2023-11-16 DIAGNOSIS — R74.8 ABNORMAL LEVELS OF OTHER SERUM ENZYMES: ICD-10-CM

## 2023-11-16 DIAGNOSIS — G47.33 OBSTRUCTIVE SLEEP APNEA (ADULT) (PEDIATRIC): ICD-10-CM

## 2023-11-16 PROCEDURE — 99214 OFFICE O/P EST MOD 30 MIN: CPT | Mod: 25

## 2023-11-16 PROCEDURE — 93000 ELECTROCARDIOGRAM COMPLETE: CPT

## 2023-11-30 NOTE — DISCHARGE NOTE ADULT - REASON FOR ADMISSION
15 called @ 2228     Kirby Norman  11/29/23 2221
Hosp called @ 2134     Atiya Jay  11/29/23 2134
Hosp called back @ 1557     Betsey Jay  11/29/23 1031
Pt encouraged to try for urine sample.       Kali Horton, MAXIMILIAN  11/29/23 1829
Report from [facility], care assumed at this time.       Evelyn Rainey RN  11/29/23 1947
Report was given to Nichole Lawrence RN  11/29/23 1009
dizziness

## 2023-12-22 ENCOUNTER — RX RENEWAL (OUTPATIENT)
Age: 73
End: 2023-12-22

## 2024-01-26 NOTE — ED PROVIDER NOTE - CROS ED RESP ALL NEG
Here to discuss colonoscopy Having 1 bm per day without bleeding.  Last exam: 2013 with us  Family history of colon cancer: NONE  Denies history of stroke, heart attack, pacemaker, defibrillator or stents. Denies COPD, asthma or sleep apnea. Denies heartburn, nausea, vomiting or dysphagia.
none
- - -

## 2024-03-26 ENCOUNTER — APPOINTMENT (OUTPATIENT)
Dept: ELECTROPHYSIOLOGY | Facility: CLINIC | Age: 74
End: 2024-03-26
Payer: COMMERCIAL

## 2024-03-26 VITALS
BODY MASS INDEX: 31.25 KG/M2 | HEART RATE: 80 BPM | WEIGHT: 211 LBS | SYSTOLIC BLOOD PRESSURE: 130 MMHG | HEIGHT: 69 IN | DIASTOLIC BLOOD PRESSURE: 78 MMHG

## 2024-03-26 DIAGNOSIS — R94.31 ABNORMAL ELECTROCARDIOGRAM [ECG] [EKG]: ICD-10-CM

## 2024-03-26 PROCEDURE — 93000 ELECTROCARDIOGRAM COMPLETE: CPT

## 2024-03-26 PROCEDURE — 99214 OFFICE O/P EST MOD 30 MIN: CPT | Mod: 25

## 2024-03-26 RX ORDER — METOPROLOL SUCCINATE 25 MG/1
25 TABLET, EXTENDED RELEASE ORAL
Qty: 180 | Refills: 3 | Status: ACTIVE | COMMUNITY

## 2024-03-26 RX ORDER — APIXABAN 5 MG/1
5 TABLET, FILM COATED ORAL
Qty: 90 | Refills: 3 | Status: ACTIVE | COMMUNITY

## 2024-03-26 NOTE — REASON FOR VISIT
[Arrhythmia/ECG Abnorrmalities] : arrhythmia/ECG abnormalities [FreeTextEntry3] : Dr. Beckie Lilly - Dr. Terrell Heard 12-Mar-2021 06:14

## 2024-03-26 NOTE — PHYSICAL EXAM
[Well Nourished] : well nourished [Well Developed] : well developed [No Acute Distress] : no acute distress [Normal Venous Pressure] : normal venous pressure [Normal Conjunctiva] : normal conjunctiva [No Carotid Bruit] : no carotid bruit [Normal S1, S2] : normal S1, S2 [No Murmur] : no murmur [No Rub] : no rub [No Gallop] : no gallop [Clear Lung Fields] : clear lung fields [No Respiratory Distress] : no respiratory distress  [Good Air Entry] : good air entry [Soft] : abdomen soft [Non Tender] : non-tender [Normal Bowel Sounds] : normal bowel sounds [No Masses/organomegaly] : no masses/organomegaly [Normal Gait] : normal gait [No Edema] : no edema [No Cyanosis] : no cyanosis [No Clubbing] : no clubbing [No Rash] : no rash [No Varicosities] : no varicosities [No Skin Lesions] : no skin lesions [Moves all extremities] : moves all extremities [Normal Speech] : normal speech [No Focal Deficits] : no focal deficits [Normal memory] : normal memory [Alert and Oriented] : alert and oriented

## 2024-03-26 NOTE — DISCUSSION/SUMMARY
[EKG obtained to assist in diagnosis and management of assessed problem(s)] : EKG obtained to assist in diagnosis and management of assessed problem(s) [FreeTextEntry1] : Mr. Thais Holcomb is a pleasant 73 year-old man with hypertension, hyperlipidemia, gout, paroxysmal atrial fibrillation, non-obstructive CAD, diffuse T wave inversion in precordial leads, and Apical Hypertrophic cardiomyopathy.   Patient was diagnosed with atrial fibrillation on May 11, 2023 during presentation to [facility] ER in AF. He is being treated with Metoprolol 25 mg twice a day and Eliquis 5 mg twice a day for stroke prevention. His CHADSVASC score is 4 (HTN, age, CAD, CM).   He underwent on 9/6/2023 (Cryo PVI + RF CTI)  I recommend continuing Metoprolol 25 mg twice a day and Eliquis 5 mg twice a day for stroke prevention.   I reviewed result of 4 weeks MCOT. AF 15%. no VT. I recommend repeat MCOT in September 2024  I discussed result of cardiac MRI: apical HCM; renal and liver cysts. apical Hypertrophy: no LGE, no NSVT, no syncope. Will refer to HCM center: Dr. North.  I recommend f/u with PCP and renal/liver specialist for liver and kydney cysts: he saw his GI.  I recommend genetic testing for HCM: had anomaly of unkowns significance; will refer to Dr. Case. I recommend screening of 7 brothers/sisters and 1 daughter with ECG and Echo. If genetic testing +, then will test family.   I recommend continue using CPAP for obstructive sleep apnea. He follows with Dr. Arrieta.   Patient has no arrhythmias since the catheter ablation. There are no groin hematomas or bleeding. Patient is pleased with results of catheter ablation although is aware with the risk of recurrent symptoms and need for another ablation. Post ablation care was discussed in great length. I discussed with patient contacting me in case of any symptoms suggestive of recurrence.  I discussed with patient plan of care in great details. I answered all his questions to his satisfaction. Patient was pleased with the visit.  Patient will follow with me in 6 months time. Please do not hesitate to contact me at [phone] if you have any further questions regarding this patient care.

## 2024-03-26 NOTE — HISTORY OF PRESENT ILLNESS
[FreeTextEntry1] : Hypertension, hyperlipidemia, gout, apical HCM, long-standing palpitations attributed to panic attacks, recently diagnosed atrial fibrillation.   Patient presented to [facility] on May 11, 2023 for atrial fibrillation with rapid ventricular response. He had severe palpitations similar to prior palpitations since 2019 attributed to panic attacks. He was started on Diltiazem and converted spontaneously to sinus rhythm.   Since discharge from the hospital, patient has been taking DOAC and Metoprolol. He had no symptoms of AF until last night when he had a brief episode that lasted 10 minutes.   6/6/2023: MCOT AF 15% 8/31/2023: Apical HCM - no LGE 9/6/2023: successful Cryo PVI + RF CTI   3/26/2024: no bleeding. compliant with medications. Patient has no chest pain, no shortness of breath at rest, no dyspnea on exertion, no lightheadedness, no dizziness, and no syncope. He presents for evaluation.

## 2024-03-26 NOTE — CARDIOLOGY SUMMARY
[de-identified] : 6/6/2023: MCOT AF 15%  [de-identified] : (03/26/2024): Sinus rhythm at 80 bpm, diffuse T wave inversion precordially, Q waves in lead III.  (06/06/2023): Sinus rhythm at 59 bpm, diffuse T wave inversion precordially, Q waves in lead III.   [de-identified] : (05/11/2023): 2D echo. EF 58%. Mild concentric LVH. Moderately enlarged left atrium. Mild MR. Mild TR. Mild AI. [de-identified] : (04/05/2018): Nuclear stress test. No evidence of ischemia.  [de-identified] : (8/31/2023) CMR: *  Normal left ventricular systolic function. LVEF 67%. *  Apical hypertrophy. *  No evidence of myocardial late gadolinium enhancement. *  Normal right ventricular size and function. RVEF 53% *  Hepatic and renal findings as above. *  Findings discussed with the referring cardiologist. [de-identified] : 9/6/2023: successful Cryo PVI + RF CTI  [de-identified] : (11/12/2021): Coronary angiogram at [facility]. Mild irregularity of the coronary anatomy. Non-obstructive CAD. \par

## 2024-04-06 NOTE — ED PROVIDER NOTE - CARE PLAN
1 Assessment and plan  Principal hospital problem: Occlusion of right LE bypass graft leading to right AKA.  Coping/psychosocial: calm, cooperative, anxious. Gets agitated when pain increases, calms down after pain medication starts working.  Cognitive: alert and oriented x 4.   Vital signs: stable on 2L NC.  Cardiovascular: denied chest pain.  Pulmonary: Lung sounds clear and equal bilaterally.   Musculoskeletal: moderately impaired.  Pain: acute pain in back and incisional, managed well with dilaudid and tylenol.  IV Access/drains: dextrose & NSL infusing at 25 ml/hr. Blood 1 unit given.   Wound/Skin: Right AKA connected to wound vac. Bruises on R leg and scattered. Left groin site, right abdominal dressing CDI. Sacral mepilex.  GI/: denied nausea and vomiting, active bowel sounds. Pt on dialysis, had dialysis today. Incontinent of bowel x 2 this shift.  Alvarez catheter: not present.  Diet: NPO, tube feed at 40 ml (goal rate).  Activity: Lift transfer.  Safety: alarms on, safety rounds completed.    End of shift summary: Critical lab of Hemoglobin 6.7 this morning, platelets 38. Provider notified & pt received 1 unit of blood. No transfusion reaction. Recheck results pending. Had dialysis today. Handoff report given to next shift RN. Discharge pending safe disposition plan.     Principal Discharge DX:	Acute chest pain

## 2024-04-08 NOTE — PATIENT PROFILE ADULT - NSPROSPHOSPCHAPLAINYN_GEN_A_NUR
[de-identified] : S/P MARLEN with Dr. Duval 12/4/23 Lumbar disc degenerative disease. L4-5 stenosis. Lumbar strain and sprain. Discussed all options. Diclofenac PRN. F/U PRN. All options discussed including rest, medicine, home exercise, acupuncture, Chiropractic care, Physical Therapy, Pain management, and last resort surgery. All questions were answered, all alternatives discussed, and the patient is in complete agreement with the treatment plan which the patient contributed to and discussed with me through the shared decision-making process. Follow-up appointment as instructed. Any issues and the patient will call or come in sooner. 
no

## 2024-04-16 ENCOUNTER — APPOINTMENT (OUTPATIENT)
Dept: CARDIOLOGY | Facility: CLINIC | Age: 74
End: 2024-04-16
Payer: COMMERCIAL

## 2024-04-16 VITALS
HEART RATE: 71 BPM | HEIGHT: 69 IN | BODY MASS INDEX: 30.36 KG/M2 | WEIGHT: 205 LBS | DIASTOLIC BLOOD PRESSURE: 80 MMHG | OXYGEN SATURATION: 98 % | SYSTOLIC BLOOD PRESSURE: 130 MMHG

## 2024-04-16 DIAGNOSIS — I10 ESSENTIAL (PRIMARY) HYPERTENSION: ICD-10-CM

## 2024-04-16 DIAGNOSIS — M10.9 GOUT, UNSPECIFIED: ICD-10-CM

## 2024-04-16 DIAGNOSIS — I25.10 ATHEROSCLEROTIC HEART DISEASE OF NATIVE CORONARY ARTERY W/OUT ANGINA PECTORIS: ICD-10-CM

## 2024-04-16 DIAGNOSIS — E78.5 HYPERLIPIDEMIA, UNSPECIFIED: ICD-10-CM

## 2024-04-16 DIAGNOSIS — I51.7 CARDIOMEGALY: ICD-10-CM

## 2024-04-16 DIAGNOSIS — I42.2 OTHER HYPERTROPHIC CARDIOMYOPATHY: ICD-10-CM

## 2024-04-16 DIAGNOSIS — I48.0 PAROXYSMAL ATRIAL FIBRILLATION: ICD-10-CM

## 2024-04-16 DIAGNOSIS — G47.33 OBSTRUCTIVE SLEEP APNEA (ADULT) (PEDIATRIC): ICD-10-CM

## 2024-04-16 PROCEDURE — G2211 COMPLEX E/M VISIT ADD ON: CPT

## 2024-04-16 PROCEDURE — 99214 OFFICE O/P EST MOD 30 MIN: CPT

## 2024-04-16 NOTE — ASSESSMENT
[FreeTextEntry1] : AF: [id number] 2. Currently in NSR. - s/p CMR, AF ablation. -Continue with CPAP, Toprol.   Apical HCM:  -MRI without LGE.  -Referral to HCM specialist, Dr. North for further recommendations.  -Cardiogenetics with Dr. Case.   MERLENE: -Continue with CPAP and pulm follow up.  CAD: Non-obstructive on cardiac cath. Possible allergy to ASA. -Continue toprol 50mg PO daily. -Discontinue ASA use and plavix. Pt complains of itching to both. -Will follow with interval stress testing. - Patient is off statin until hepatic evaluation is completed.  HTN: BP near goal per ACC/AHA 2018 guidelines -Continue with Toprol 50mg PO daily. -Referral to pulmonology for sleep study. -Will trial increase in exercise for better control.  HLD: , LDL 66, trig 112   08/23 (on statin) - Patient is off statin. - will check lipid profile. - Risk factors modification - Discussed therapeutic lifestyle changes to promote improved lipid metabolism  Prediabetes: HA1c 6.1 (02/17/22) -Discussed therapeutic lifestyle changes to promote improved insulin sensitivity.  Gout: - Continue with Allopurinol.   GGO on CT: 1.3cm GGO in lung found on CT -Pulmonology follow up.   Pancreatic head hypodensity: Found incidentally on CT  - Patient follows with GI  Follow up in 4 months.

## 2024-04-16 NOTE — PHYSICAL EXAM
[Well Developed] : well developed [Well Nourished] : well nourished [No Acute Distress] : no acute distress [Normal Conjunctiva] : normal conjunctiva [Normal Venous Pressure] : normal venous pressure [5th Left ICS - MCL] : palpated at the 5th LICS in the midclavicular line [Normal] : normal [No Precordial Heave] : no precordial heave was noted [Normal Rate] : normal [Rhythm Regular] : regular [Normal S1] : normal S1 [Normal S2] : normal S2 [No Murmur] : no murmurs heard [No Pitting Edema] : no pitting edema present [2+] : left 2+ [Clear Lung Fields] : clear lung fields [Good Air Entry] : good air entry [No Respiratory Distress] : no respiratory distress  [Soft] : abdomen soft [Non Tender] : non-tender [Normal Gait] : normal gait [No Varicosities] : no varicosities [Edema ___] : edema [unfilled] [No Rash] : no rash [Moves all extremities] : moves all extremities [No Focal Deficits] : no focal deficits [Alert and Oriented] : alert and oriented

## 2024-04-16 NOTE — HISTORY OF PRESENT ILLNESS
[FreeTextEntry1] : Mr. Holcomb is a 72yo male with PMHx of non-obstructive CAD, HTN, HLD, prediabetes and gout. His PCP is Dr. Beckie Lilly. He was admitted to [facility] on 11/12/21 for evaluation of chest pain where he was found to have EKG with anterolateral TWI and negative troponins  Cardiac work up (CTA,TTE,LHC) showed normal LV function and non obstructive CAD.   He will have MRI to access cyst of pancreas revealed on CTA. Patient will see pulmonologist  for incidentally found lung nodules on CTA and rheumatology.  -Last gout attack about 1 month ago. Has been feeling better. Per wife, his diet still is not great. Denies CP, SOB, palpitations.  04/13/23-Patient has been holding atorvastatin due to body aches. Repeat bloodwork shows elevated cholesterol, CK level not checked. He is planning on increasing biking for exercise.  08/03/23-Patient had further palpitations which he went to ED for evaluation. Found to be in AF with RVR. He is following with Dr. Fonseca and will be going for AF ablation in September. He was diagnosed with MERLENE, trying to adjust to CPAP. He denies symptoms/complains.  11/16/23-Patient underwent AF ablation in September 2023. Also had MRI which showed apical HCM without LGE. The patient had genetic testing for HCM , results are pending. He feels well, denies chest pain, SOB. There were incidental findings of hepatic cysts on cardiac MRI, patient follows with GI. Patient stopped Allopurinol until hepatic evaluation is completed.  04/16/24-Patient pending HCM follow up and genetics. He is feeling well overall. No new complaints.

## 2024-04-16 NOTE — REASON FOR VISIT
[Other: ____] : [unfilled] [FreeTextEntry1] : Diagnostic Tests: -------------------- EK2023- NSR, TWI in anterolateral leads 23-NSR. TWI in anterolateral leads.  23-NSR. LAE. TWI Anterolateral.  22-NSR. TWI in anterolateral leads.  22-NSR. TWI in anterolateral leads.  22-NSR. TWI in anterolateral leads.  21-NSR. TWI in anterolateral leads.  21-NSR. TWI in anterolateral leads.  -------------------- Echo: 23-TTE: EF 58%. Mild LVH. Moderate LAE. Mild MR, TR, AI.  21-TTE: EF 60-65%. Mild AI, MR, TR.  -------------------- CT:  21-CTA CAP: No evidence of aortic dissection or aneurysm. RLL 1.5cm GGO. Pancreatic head hypodensity 1.2cm. 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 Thyroid nodules.  -------------------- Cath:  21-LHC: LAD mild, LCx minor, OM1 minor, RCA minor.  --------------------- MRI:  23-CMR: Apical hypertrophy. LVEF 67%. No LGE.

## 2024-06-02 NOTE — ED PROVIDER NOTE - NS ED ROS FT
SOLEDAD Tate Constitutional: (-) fever  Skin: (-) rash  Muskuloskeletal: (+) swelling to b/l hand swelling, left foot  Neurological: (-) altered mental status

## 2024-07-24 ENCOUNTER — APPOINTMENT (OUTPATIENT)
Dept: CARDIOLOGY | Facility: CLINIC | Age: 74
End: 2024-07-24
Payer: COMMERCIAL

## 2024-07-24 PROCEDURE — 99215 OFFICE O/P EST HI 40 MIN: CPT

## 2024-07-24 NOTE — FAMILY HISTORY
[FreeTextEntry1] : FamilyHistory_20_twCiteListControlStart FamilyHistory_20_twCiteListControlEnd [id number]  A four-generation family history was constructed and scanned into [employer].  Family history is significant for:  br Ca 2 sisters and his mother father dec pancreatic CA   [FreeTextEntry2] : Arti  [FreeTextEntry3] : Arti

## 2024-07-24 NOTE — PLAN
[TextEntry] : See above note for recommended management. A copy of genetic testing results and clinic note will be sent to  the referring cardiologist   or physician We encourage sharing these results with family members,   Contact the Cardiogenomics program at [facility] or the lab directly every few years to check on any changes in interpretation of a VUS, or if there are changes in the personal or family cardiac history.   Long-term management and surveillance for patient should be based on the patients clinical treatment as recommended by their cardiologist.   Any changes in cardiac surveillance should be discussed with the patients physician.  We remain available should there be any new information for personal or family history.  If there are any additional questions, please feel free to call the Cardiogenomics program at [facility], Geraldine Khalil MS, ANJANA or Giancarlo Case MD, PhD at [phone] Time spent counseling the patient during the visit was 45 min. I spent 45 minutes on the encounter   Patient seen with Geraldine Khalil MS, CGC, board certified genetic counsellor

## 2024-07-24 NOTE — HISTORY OF PRESENT ILLNESS
[Home] : at home, [unfilled] , at the time of the visit. [Medical Office: ([facility]___] : at the medical office located in  [Verbal consent obtained from patient] : the patient, [unfilled] [FreeTextEntry1] : RODRIGUEZ KAUFFMAN is a 73 yo M PMH HCM , apical HCM, Afib he began to feel palpitations for years   he underwent genetic testing and today he  is referred for a cardiogenomic evaluation and results discussion

## 2024-07-24 NOTE — SIGNATURES
[TextEntry] : Giancarlo Case MD, PhD  Medical Director Program for Cardiac Genetics, Genomics and Precision Medicine Department of Cardiology [facility]  [employer] at [address] [place] [address] Tel: [phone] Fax: [phone]  ([facility] office) [address], 3rd floor (between [address] and [address]) [place] [address] (p) [phone] (f) [phone]

## 2024-07-24 NOTE — DISCUSSION/SUMMARY
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  the limitations of genetic testing were discussed with RODRIGUEZ KAUFFMAN  for him  reccomend continued medical care as per his cardiology team  for his family At this time we recommend all of his  first degree relatives undergo a clinical cardiac evaluation with history, physical exam, EKG and ECHO. If their initial clinical evaluation is normal they should continued to have clinical cardiac evaluation with imaging every 3-5 years (for adults).      CTNNA3 The CTNNA3 gene encodes alpha-T-catenin, a cell adhesion molecule that is expressed in the heart and, at lower levels, in the brain (PMID: 23708858). While the function of CTNNA3 has not been completely defined, it has been proposed that mutant alpha-T-catenin proteins may lead to weakened junctions between adjacent cardiomyocytes and to disruption of tissue integrity (PMID: 06844642). Heterozygous sequence variants in CTNNA3, both inherited and de devi, have been reported in several individuals with arrhythmogenic right ventricular cardiomyopathy (ARVC), but have also been reported in an unaffected parent (PMID: 51238030, 81714995). Heterozygous copy number variants involving CTNNA3 have been reported in multiple unrelated individuals with autism, although there is conflicting data regarding whether the incidence is increased in individuals with autism as compared with controls (PMID: 82317984, 88201295). Biallelic CTNNA3 deletions have also been reported in one individual with autism and intellectual disability (PMID: 56697321). As published reports are limited, additional evidence is needed to further characterize the association of variants in CTNNA3 with human disease. p.(Nye205Cqb) (CAG>AAG): c.2368 C>A in exon 17 of the CTNNA3 gene (NM_[id number]) Has not been previously published as pathogenic or benign to our knowledge Not observed at significant frequency in large population cohorts (gnomAD) In silico analysis supports that this missense variant has a deleterious effect on protein structure/function Variants in candidate genes are classified as variants of uncertain significance in accordance with ACMG guidelines (PMID: 88168211) We interpret this as a Variant of Uncertain Significance. - KCND3 The KCND3 gene encodes a voltage-gated potassium ion channel, which plays a role in the repolarization phase of the action potential (PMID: 30171923). Potassium ion channels function to regulate neurotransmitter release, heart rate, insulin secretion, neuron function, smooth muscle contraction, epithelial electrolyte transport and cell volume. Heterozygous variants in the KCND3 gene have been reported in association with autosomal dominant spinocerebellar ataxia type 19 (SCA19; also known as spinocerebellar ataxia type 22) (PMID: 16509266, 08301423). SCA19/22 is characterized by cerebellar atrophy generally with slowly progressive cerebellar ataxia with variable age of onset (PMID: 40499939, 33724135). Other symptoms reported in some individuals have included cognitive impairment, epilepsy, Parkinsonism, dysgraphia, dysarthria, dysphagia, pyramidal signs, neuropathy, oculomotor disturbance, visuospatial impairments, and hearing impairment (PMID: 99431445, 22756068, 14769058). Although most of the variants reported have been inherited from an affected parent, de devi occurrence has also been reported in a small number of individuals with intellectual disability and early-onset cerebellar ataxia (PMID: 82578651). It has been suggested that loss-of-function variants in the KCND3 gene cause SCA (PMID: 76162548, 62071564). Heterozygous gain-of-function variants in KCND3 have been reported in association with cardiac arrhythmias including Brugada syndrome, atrial fibrillation, and early repolarization syndrome in a small number of individuals with no reported neurologic symptoms, and have also been reported in cases of sudden unexplained death (PMID: 47315404, 67024203, 74426298, 13754211). p.(Zxx974Yns) (ACG>GCG): c.1369 A>G in exon 4 of the KCND3 gene ([id number]) Has not been previously published as pathogenic or benign to our knowledge Not observed at significant frequency in large population cohorts (gnomAD) In silico analysis supports that this missense variant does not alter protein structure/function We interpret this as a Variant of Uncertain Significance.

## 2024-07-24 NOTE — ASSESSMENT
[TextEntry] : RODRIGUEZ KAUFFMAN is a 73 yo M PMH HCM , apical HCM, Afib He underwent genetic testing    No known pathogenic variants were identified in any of the 138 genes evaluated  a variant of uncertain significance (VUS)  was identified in the CTNNA3 and the KCND3 genes that are of low clinical suspicion.  the limitations of genetic testing were discussed with RODRIGUEZ KAUFFMAN  for him  recommend continued medical care as per his cardiology team also recommend genetic evaluation for cancer genetics due to his family history of CA, call medical genetics for an appointment at [phone]  for his family At this time we recommend all of his  first degree relatives undergo a clinical cardiac evaluation with history, physical exam, EKG and ECHO. If their initial clinical evaluation is normal they should continued to have clinical cardiac evaluation with imaging every 3-5 years (for adults).      Genetic knowledge changes rapidly.  We encourage re-contacting the cardiogenomics program at [facility] if there are significant changes in family or personal health, and annually. RODRIGUEZ KAUFFMAN expressed understanding of the presented information and satisfaction with having his questions and concerns addressed.

## 2024-07-24 NOTE — REASON FOR VISIT
[FreeTextEntry3] : Dear Dr. Herrera        . I saw your patient RODRIGUEZ KAUFFMAN on 07/24/2024 . Please see the note below for the assessment and plan.   RODRIGUEZ KAUFFMAN  was seen  for an initial consultation at the Cardiogenomics Program at [facility] on 07/24/2024.   Mr. KAUFFMAN was referred by Dr. Herrera    for hereditary cardiac predisposition risk assessment and counseling, due to Apical HCM and Afib

## 2024-08-22 ENCOUNTER — APPOINTMENT (OUTPATIENT)
Dept: CARDIOLOGY | Facility: CLINIC | Age: 74
End: 2024-08-22

## 2024-08-22 VITALS — SYSTOLIC BLOOD PRESSURE: 150 MMHG | DIASTOLIC BLOOD PRESSURE: 88 MMHG | HEART RATE: 82 BPM

## 2024-08-22 VITALS — BODY MASS INDEX: 31.25 KG/M2 | WEIGHT: 211 LBS | HEIGHT: 69 IN

## 2024-08-22 VITALS — SYSTOLIC BLOOD PRESSURE: 130 MMHG | DIASTOLIC BLOOD PRESSURE: 74 MMHG

## 2024-08-22 DIAGNOSIS — R94.31 ABNORMAL ELECTROCARDIOGRAM [ECG] [EKG]: ICD-10-CM

## 2024-08-22 DIAGNOSIS — I51.7 CARDIOMEGALY: ICD-10-CM

## 2024-08-22 DIAGNOSIS — E78.5 HYPERLIPIDEMIA, UNSPECIFIED: ICD-10-CM

## 2024-08-22 DIAGNOSIS — M10.9 GOUT, UNSPECIFIED: ICD-10-CM

## 2024-08-22 DIAGNOSIS — I42.2 OTHER HYPERTROPHIC CARDIOMYOPATHY: ICD-10-CM

## 2024-08-22 DIAGNOSIS — R73.03 PREDIABETES.: ICD-10-CM

## 2024-08-22 DIAGNOSIS — I10 ESSENTIAL (PRIMARY) HYPERTENSION: ICD-10-CM

## 2024-08-22 DIAGNOSIS — G47.33 OBSTRUCTIVE SLEEP APNEA (ADULT) (PEDIATRIC): ICD-10-CM

## 2024-08-22 DIAGNOSIS — I25.10 ATHEROSCLEROTIC HEART DISEASE OF NATIVE CORONARY ARTERY W/OUT ANGINA PECTORIS: ICD-10-CM

## 2024-08-22 DIAGNOSIS — I48.0 PAROXYSMAL ATRIAL FIBRILLATION: ICD-10-CM

## 2024-08-22 DIAGNOSIS — R74.8 ABNORMAL LEVELS OF OTHER SERUM ENZYMES: ICD-10-CM

## 2024-08-22 PROCEDURE — G2211 COMPLEX E/M VISIT ADD ON: CPT | Mod: NC

## 2024-08-22 PROCEDURE — 93000 ELECTROCARDIOGRAM COMPLETE: CPT

## 2024-08-22 PROCEDURE — 99214 OFFICE O/P EST MOD 30 MIN: CPT | Mod: 25

## 2024-08-22 NOTE — HISTORY OF PRESENT ILLNESS
[FreeTextEntry1] : Mr. Holcomb is a 73yo male with PMHx of non-obstructive CAD, HTN, HLD, prediabetes and gout. His PCP is Dr. Beckie Lilly. He was admitted to [facility] on 11/12/21 for evaluation of chest pain where he was found to have EKG with anterolateral TWI and negative troponins  Cardiac work up (CTA,TTE,LHC) showed normal LV function and non obstructive CAD.   He will have MRI to access cyst of pancreas revealed on CTA. Patient will see pulmonologist  for incidentally found lung nodules on CTA and rheumatology.  -Last gout attack about 1 month ago. Has been feeling better. Per wife, his diet still is not great. Denies CP, SOB, palpitations.  04/13/23-Patient has been holding atorvastatin due to body aches. Repeat bloodwork shows elevated cholesterol, CK level not checked. He is planning on increasing biking for exercise.  08/03/23-Patient had further palpitations which he went to ED for evaluation. Found to be in AF with RVR. He is following with Dr. Fonseca and will be going for AF ablation in September. He was diagnosed with MERLENE, trying to adjust to CPAP. He denies symptoms/complains.  11/16/23-Patient underwent AF ablation in September 2023. Also had MRI which showed apical HCM without LGE. The patient had genetic testing for HCM , results are pending. He feels well, denies chest pain, SOB. There were incidental findings of hepatic cysts on cardiac MRI, patient follows with GI. Patient stopped Allopurinol until hepatic evaluation is completed.  04/16/24-Patient pending HCM follow up and genetics. He is feeling well overall. No new complaints.  08/22/24-Patient somewhat upset due to traffic. His 2 sisters have cancer, one doing well with immunotherapy and his other sister is not doing well.

## 2024-11-06 ENCOUNTER — RX RENEWAL (OUTPATIENT)
Age: 74
End: 2024-11-06

## 2024-11-27 ENCOUNTER — APPOINTMENT (OUTPATIENT)
Dept: PULMONOLOGY | Facility: CLINIC | Age: 74
End: 2024-11-27
Payer: COMMERCIAL

## 2024-11-27 VITALS
WEIGHT: 210 LBS | HEIGHT: 69 IN | BODY MASS INDEX: 31.1 KG/M2 | RESPIRATION RATE: 14 BRPM | DIASTOLIC BLOOD PRESSURE: 84 MMHG | HEART RATE: 79 BPM | SYSTOLIC BLOOD PRESSURE: 142 MMHG | OXYGEN SATURATION: 97 %

## 2024-11-27 DIAGNOSIS — G47.33 OBSTRUCTIVE SLEEP APNEA (ADULT) (PEDIATRIC): ICD-10-CM

## 2024-11-27 DIAGNOSIS — R91.1 SOLITARY PULMONARY NODULE: ICD-10-CM

## 2024-11-27 DIAGNOSIS — E66.811 OBESITY, CLASS 1: ICD-10-CM

## 2024-11-27 PROCEDURE — 99214 OFFICE O/P EST MOD 30 MIN: CPT

## 2024-12-09 ENCOUNTER — APPOINTMENT (OUTPATIENT)
Dept: ELECTROPHYSIOLOGY | Facility: CLINIC | Age: 74
End: 2024-12-09

## 2024-12-09 VITALS
SYSTOLIC BLOOD PRESSURE: 132 MMHG | HEIGHT: 69 IN | HEART RATE: 76 BPM | DIASTOLIC BLOOD PRESSURE: 80 MMHG | WEIGHT: 210 LBS | BODY MASS INDEX: 31.1 KG/M2

## 2024-12-09 DIAGNOSIS — G47.33 OBSTRUCTIVE SLEEP APNEA (ADULT) (PEDIATRIC): ICD-10-CM

## 2024-12-09 DIAGNOSIS — I48.0 PAROXYSMAL ATRIAL FIBRILLATION: ICD-10-CM

## 2024-12-09 DIAGNOSIS — Z87.39 PERSONAL HISTORY OF OTHER DISEASES OF THE MUSCULOSKELETAL SYSTEM AND CONNECTIVE TISSUE: ICD-10-CM

## 2024-12-09 DIAGNOSIS — I42.2 OTHER HYPERTROPHIC CARDIOMYOPATHY: ICD-10-CM

## 2024-12-09 DIAGNOSIS — I10 ESSENTIAL (PRIMARY) HYPERTENSION: ICD-10-CM

## 2024-12-09 PROCEDURE — 99215 OFFICE O/P EST HI 40 MIN: CPT | Mod: 25

## 2024-12-09 PROCEDURE — 93000 ELECTROCARDIOGRAM COMPLETE: CPT

## 2024-12-21 ENCOUNTER — INPATIENT (INPATIENT)
Facility: HOSPITAL | Age: 74
LOS: 0 days | Discharge: HOME | DRG: 313 | End: 2024-12-22
Attending: HOSPITALIST | Admitting: STUDENT IN AN ORGANIZED HEALTH CARE EDUCATION/TRAINING PROGRAM
Payer: COMMERCIAL

## 2024-12-21 VITALS
OXYGEN SATURATION: 97 % | WEIGHT: 210.1 LBS | TEMPERATURE: 98 F | HEIGHT: 68 IN | SYSTOLIC BLOOD PRESSURE: 169 MMHG | DIASTOLIC BLOOD PRESSURE: 85 MMHG | HEART RATE: 78 BPM | RESPIRATION RATE: 18 BRPM

## 2024-12-21 DIAGNOSIS — Z98.890 OTHER SPECIFIED POSTPROCEDURAL STATES: Chronic | ICD-10-CM

## 2024-12-21 DIAGNOSIS — R07.9 CHEST PAIN, UNSPECIFIED: ICD-10-CM

## 2024-12-21 LAB
ALBUMIN SERPL ELPH-MCNC: 4.5 G/DL — SIGNIFICANT CHANGE UP (ref 3.5–5.2)
ALP SERPL-CCNC: 87 U/L — SIGNIFICANT CHANGE UP (ref 30–115)
ALT FLD-CCNC: 40 U/L — SIGNIFICANT CHANGE UP (ref 0–41)
ANION GAP SERPL CALC-SCNC: 13 MMOL/L — SIGNIFICANT CHANGE UP (ref 7–14)
AST SERPL-CCNC: 43 U/L — HIGH (ref 0–41)
BASOPHILS # BLD AUTO: 0.05 K/UL — SIGNIFICANT CHANGE UP (ref 0–0.2)
BASOPHILS NFR BLD AUTO: 0.6 % — SIGNIFICANT CHANGE UP (ref 0–1)
BILIRUB SERPL-MCNC: 0.4 MG/DL — SIGNIFICANT CHANGE UP (ref 0.2–1.2)
BUN SERPL-MCNC: 15 MG/DL — SIGNIFICANT CHANGE UP (ref 10–20)
CALCIUM SERPL-MCNC: 9.7 MG/DL — SIGNIFICANT CHANGE UP (ref 8.4–10.4)
CHLORIDE SERPL-SCNC: 101 MMOL/L — SIGNIFICANT CHANGE UP (ref 98–110)
CO2 SERPL-SCNC: 22 MMOL/L — SIGNIFICANT CHANGE UP (ref 17–32)
CREAT SERPL-MCNC: 1.1 MG/DL — SIGNIFICANT CHANGE UP (ref 0.7–1.5)
EGFR: 71 ML/MIN/1.73M2 — SIGNIFICANT CHANGE UP
EOSINOPHIL # BLD AUTO: 0.13 K/UL — SIGNIFICANT CHANGE UP (ref 0–0.7)
EOSINOPHIL NFR BLD AUTO: 1.5 % — SIGNIFICANT CHANGE UP (ref 0–8)
GLUCOSE SERPL-MCNC: 149 MG/DL — HIGH (ref 70–99)
HCT VFR BLD CALC: 49.2 % — SIGNIFICANT CHANGE UP (ref 42–52)
HGB BLD-MCNC: 17 G/DL — SIGNIFICANT CHANGE UP (ref 14–18)
IMM GRANULOCYTES NFR BLD AUTO: 0.4 % — HIGH (ref 0.1–0.3)
LYMPHOCYTES # BLD AUTO: 2.11 K/UL — SIGNIFICANT CHANGE UP (ref 1.2–3.4)
LYMPHOCYTES # BLD AUTO: 24.9 % — SIGNIFICANT CHANGE UP (ref 20.5–51.1)
MCHC RBC-ENTMCNC: 32.5 PG — HIGH (ref 27–31)
MCHC RBC-ENTMCNC: 34.6 G/DL — SIGNIFICANT CHANGE UP (ref 32–37)
MCV RBC AUTO: 94.1 FL — HIGH (ref 80–94)
MONOCYTES # BLD AUTO: 0.66 K/UL — HIGH (ref 0.1–0.6)
MONOCYTES NFR BLD AUTO: 7.8 % — SIGNIFICANT CHANGE UP (ref 1.7–9.3)
NEUTROPHILS # BLD AUTO: 5.51 K/UL — SIGNIFICANT CHANGE UP (ref 1.4–6.5)
NEUTROPHILS NFR BLD AUTO: 64.8 % — SIGNIFICANT CHANGE UP (ref 42.2–75.2)
NRBC # BLD: 0 /100 WBCS — SIGNIFICANT CHANGE UP (ref 0–0)
PLATELET # BLD AUTO: 149 K/UL — SIGNIFICANT CHANGE UP (ref 130–400)
PMV BLD: 11.6 FL — HIGH (ref 7.4–10.4)
POTASSIUM SERPL-MCNC: 4.3 MMOL/L — SIGNIFICANT CHANGE UP (ref 3.5–5)
POTASSIUM SERPL-SCNC: 4.3 MMOL/L — SIGNIFICANT CHANGE UP (ref 3.5–5)
PROT SERPL-MCNC: 7.9 G/DL — SIGNIFICANT CHANGE UP (ref 6–8)
RBC # BLD: 5.23 M/UL — SIGNIFICANT CHANGE UP (ref 4.7–6.1)
RBC # FLD: 12.6 % — SIGNIFICANT CHANGE UP (ref 11.5–14.5)
SODIUM SERPL-SCNC: 136 MMOL/L — SIGNIFICANT CHANGE UP (ref 135–146)
TROPONIN T, HIGH SENSITIVITY RESULT: 11 NG/L — SIGNIFICANT CHANGE UP (ref 6–21)
TROPONIN T, HIGH SENSITIVITY RESULT: 12 NG/L — SIGNIFICANT CHANGE UP (ref 6–21)
WBC # BLD: 8.49 K/UL — SIGNIFICANT CHANGE UP (ref 4.8–10.8)
WBC # FLD AUTO: 8.49 K/UL — SIGNIFICANT CHANGE UP (ref 4.8–10.8)

## 2024-12-21 PROCEDURE — 83036 HEMOGLOBIN GLYCOSYLATED A1C: CPT

## 2024-12-21 PROCEDURE — 80053 COMPREHEN METABOLIC PANEL: CPT

## 2024-12-21 PROCEDURE — 93010 ELECTROCARDIOGRAM REPORT: CPT | Mod: 76

## 2024-12-21 PROCEDURE — 93005 ELECTROCARDIOGRAM TRACING: CPT

## 2024-12-21 PROCEDURE — 99285 EMERGENCY DEPT VISIT HI MDM: CPT | Mod: GC

## 2024-12-21 PROCEDURE — 83735 ASSAY OF MAGNESIUM: CPT

## 2024-12-21 PROCEDURE — 85025 COMPLETE CBC W/AUTO DIFF WBC: CPT

## 2024-12-21 PROCEDURE — 36415 COLL VENOUS BLD VENIPUNCTURE: CPT

## 2024-12-21 PROCEDURE — G0378: CPT

## 2024-12-21 PROCEDURE — 84484 ASSAY OF TROPONIN QUANT: CPT

## 2024-12-21 PROCEDURE — 93307 TTE W/O DOPPLER COMPLETE: CPT

## 2024-12-21 PROCEDURE — 71045 X-RAY EXAM CHEST 1 VIEW: CPT | Mod: 26

## 2024-12-21 PROCEDURE — 80061 LIPID PANEL: CPT

## 2024-12-21 PROCEDURE — 84443 ASSAY THYROID STIM HORMONE: CPT

## 2024-12-21 NOTE — ED PROVIDER NOTE - ATTENDING CONTRIBUTION TO CARE
74-year-old male, past medical history of hypertension, hyperlipidemia, CAD, MERLENE, paroxysmal A-fib status post ablation still on Eliquis, presenting for intermittent chest pain since yesterday, no alleviating or aggravating factors, "feels like when I get my A-fib that feels like a panic attack."  He states that he lifted something heavy yesterday and is unsure if it is related to that.  Denies fevers, URI symptoms, shortness of breath, nausea, vomiting, abdominal pain.    CONSTITUTIONAL: Well-developed; well-nourished; in no acute distress.   SKIN: warm, dry  HEAD: Normocephalic  EYES: no conjunctival erythema  ENT: No nasal discharge; airway clear.  NECK: Supple  CARD: S1, S2 normal; irregular rhythm  RESP: No wheezes, rales or rhonchi.  ABD: soft ntnd  EXT: Normal ROM.  No clubbing, cyanosis or edema.   NEURO: Alert, oriented, grossly unremarkable  PSYCH: Cooperative, appropriate.

## 2024-12-21 NOTE — ED PROVIDER NOTE - OBJECTIVE STATEMENT
74 M hx of HTN, HLD, panic attacks, recently diagnosed atrial fibrillation,  apical HCM, non obstructive CAD cath ( 11/2021),  EF 58% (echo 5/11/23), presented To the ED today with palpitations.  Patient had similar palpitations 2 weeks ago and saw his EP doctor in the office and [facility] 1 episode of A-fib.  Patient said he was feeling nervous and had a panic attack similar to the ones he had in the past but also feels like the last time he had A-fib.  Patient denies any shortness of breath nausea vomiting diaphoresis.

## 2024-12-21 NOTE — PATIENT PROFILE ADULT - FALL HARM RISK - UNIVERSAL INTERVENTIONS
Bed in lowest position, wheels locked, appropriate side rails in place/Call bell, personal items and telephone in reach/Instruct patient to call for assistance before getting out of bed or chair/Non-slip footwear when patient is out of bed/Redig to call system/Physically safe environment - no spills, clutter or unnecessary equipment/Purposeful Proactive Rounding/Room/bathroom lighting operational, light cord in reach

## 2024-12-21 NOTE — ED PROVIDER NOTE - CLINICAL SUMMARY MEDICAL DECISION MAKING FREE TEXT BOX
74-year-old male, past medical history of hypertension, hyperlipidemia, CAD, MERLENE, paroxysmal A-fib status post ablation still on Eliquis, presenting for intermittent chest pain since yesterday, no alleviating or aggravating factors, "feels like when I get my A-fib that feels like a panic attack."  He states that he lifted something heavy yesterday and is unsure if it is related to that.  Denies fevers, URI symptoms, shortness of breath, nausea, vomiting, abdominal pain.    Cardiologist Dr. Félix Fu 74-year-old male, past medical history of hypertension, hyperlipidemia, CAD, MERLENE, paroxysmal A-fib status post ablation still on Eliquis, presenting for intermittent chest pain since yesterday, associated with left arm numbness, no alleviating or aggravating factors, "feels like when I get my A-fib that feels like a panic attack."  He states that he lifted something heavy yesterday and is unsure if it is related to that.  Currently asymptomatic.  Denies fevers, URI symptoms, shortness of breath, nausea, vomiting, abdominal pain.    Cardiologist Dr. Heard  EP Dr. Fu - last appointment 2 weeks ago.    Labs and EKG were ordered and reviewed.  Imaging was ordered and reviewed by me.  Patient's records (prior hospital, ED visit, and/or nursing home notes if available) were reviewed.  Additional history was obtained from EMS, family, and/or PCP (where available).  Escalation to admission/observation was considered.  Cardiology consulted and recommending admission for monitoring.

## 2024-12-21 NOTE — ED PROVIDER NOTE - PHYSICAL EXAMINATION
CONSTITUTIONAL: Well-developed; well-nourished; in no acute distress.   SKIN: warm, dry  HEAD: Normocephalic; atraumatic.  EYES: PERRL, EOMI, no conjunctival erythema  ENT: No nasal discharge; airway clear.  NECK: Supple; non tender.  CARD: Irregular   RESP: No wheezes, rales or rhonchi.  ABD: soft ntnd  EXT: Normal ROM.  No clubbing, cyanosis or edema.   LYMPH: No acute cervical adenopathy.  NEURO: Alert, oriented, grossly unremarkable  PSYCH: Cooperative, appropriate.

## 2024-12-21 NOTE — CONSULT NOTE ADULT - SUBJECTIVE AND OBJECTIVE BOX
Outpt cardiologist:  Dr. Heard (Cardiology)  Dr. Fonseca (EP)  HISTORY OF PRESENT ILLNESS:  74 M hx of HTN, HLD, panic attacks, recently diagnosed atrial fibrillation,  apical HCM, non obstructive CAD cath ( 11/2021),  EF 58% (echo 5/11/23), presented To the ED today with palpitations.  Patient had similar palpitations 2 weeks ago and saw his EP doctor in the office [facility].  Patient said he was feeling nervous and had a panic attack similar to the ones he had in the past but also feels like the last time he had A-fib.  Patient denies any shortness of breath nausea vomiting diaphoresis.    Cardiology Fellow Notes      PAST MEDICAL & SURGICAL HISTORY  Hypertension    High cholesterol    Gout    Atrial fibrillation and flutter    Glaucoma    Elevated liver enzymes    Obstructive sleep apnea    History of appendectomy        FAMILY HISTORY:  FAMILY HISTORY:  Family history of pancreatic cancer (Father)  father    Family history of breast cancer (Mother)        SOCIAL HISTORY:  Social History:      ALLERGIES:  No Known Allergies      MEDICATIONS:    PRN:      HOME MEDICATIONS:  Home Medications:  allopurinol 100 mg oral tablet: 1 tab(s) orally once a day (06 Sep 2023 15:01)  brimonidine 0.2% ophthalmic solution: 1 drop(s) to each affected eye once a day (06 Sep 2023 07:02)  dorzolamide 2% ophthalmic solution: 1 drop(s) to each affected eye 3 times a day (06 Sep 2023 07:02)  latanoprost 0.005% ophthalmic solution: 1 drop(s) to each affected eye once a day (at bedtime) (06 Sep 2023 07:02)  metoprolol tartrate 25 mg oral tablet: 1 tab(s) orally 2 times a day (06 Sep 2023 15:01)  omega-3 polyunsaturated fatty acids ethyl esters 1000 mg oral capsule: 2 cap(s) orally 2 times a day (06 Sep 2023 07:02)  timolol maleate 0.5% ophthalmic solution: 1 drop(s) to each affected eye 2 times a day (06 Sep 2023 07:02)      VITALS:   T(F): 97.7 (12-21 @ 17:47), Max: 97.7 (12-21 @ 17:47)  HR: 78 (12-21 @ 17:47) (78 - 78)  BP: 169/85 (12-21 @ 17:47) (169/85 - 169/85)  BP(mean): --  RR: 18 (12-21 @ 17:47) (18 - 18)  SpO2: 97% (12-21 @ 17:47) (97% - 97%)    I&O's Summary      REVIEW OF SYSTEMS:  CONSTITUTIONAL: No weakness, fevers or chills  HEENT: No visual changes, neck/ear pain  RESPIRATORY: No cough, sob  CARDIOVASCULAR: See HPI    PHYSICAL EXAM:  *General: Not in distress.  Non-toxic appearing.   *HEENT: EOMI  *Cardio: regular, S1, S2, no murmur  *Pulm: B/L BS.  No wheezing / crackles / rales  *Abdomen: Soft, non-tender, non-distended. Normoactive bowel sounds  *Extremities: No edema b/l le. Warm. Knee caps warm. Pulses + bilaterally. Normal capillary refill.   *Neuro: A&O x3. No focal deficits    LABS:                        17.0   8.49  )-----------( 149      ( 21 Dec 2024 18:20 )             49.2     12-21    136  |  101  |  15  ----------------------------<  149[H]  4.3   |  22  |  1.1    Ca    9.7      21 Dec 2024 18:20    TPro  7.9  /  Alb  4.5  /  TBili  0.4  /  DBili  x   /  AST  43[H]  /  ALT  40  /  AlkPhos  87  12-21              Troponin trend:    11-        IMAGING:  - CXR:  Clear    - TTE:  EF 55-60%    - CATHETERIZATION:  2021- Non obstructive CAD      ECG:  LVH and twi in anterolateral leads (old)      TELEMETRY EVENTS:   Outpt cardiologist:  Dr. Heard (Cardiology)  Dr. Fonseca (EP)  HISTORY OF PRESENT ILLNESS:  74 M hx of HTN, HLD, panic attacks, recently diagnosed atrial fibrillation,  apical HCM, non obstructive CAD cath ( 11/2021),  EF 58% (echo 5/11/23), presented To the ED today with palpitations.  Patient had similar palpitations 2 weeks ago and saw his EP doctor in the office [facility].  Patient said he was feeling nervous and had a panic attack similar to the ones he had in the past but also feels like the last time he had A-fib.  Patient denies any shortness of breath nausea vomiting diaphoresis.    Cardiology Fellow Notes  Patient seen and examined. Reported episode of chest pain that happened 1 day PTP after he pulled up something heavy. Today he started having palpitations. Denies dizziness, N/V, diaphoresis.    PAST MEDICAL & SURGICAL HISTORY  Hypertension    High cholesterol    Gout    Atrial fibrillation and flutter    Glaucoma    Elevated liver enzymes    Obstructive sleep apnea    History of appendectomy        FAMILY HISTORY:  FAMILY HISTORY:  Family history of pancreatic cancer (Father)  father    Family history of breast cancer (Mother)        SOCIAL HISTORY:  Social History:      ALLERGIES:  No Known Allergies      MEDICATIONS:    PRN:      HOME MEDICATIONS:  Home Medications:  allopurinol 100 mg oral tablet: 1 tab(s) orally once a day (06 Sep 2023 15:01)  brimonidine 0.2% ophthalmic solution: 1 drop(s) to each affected eye once a day (06 Sep 2023 07:02)  dorzolamide 2% ophthalmic solution: 1 drop(s) to each affected eye 3 times a day (06 Sep 2023 07:02)  latanoprost 0.005% ophthalmic solution: 1 drop(s) to each affected eye once a day (at bedtime) (06 Sep 2023 07:02)  metoprolol tartrate 25 mg oral tablet: 1 tab(s) orally 2 times a day (06 Sep 2023 15:01)  omega-3 polyunsaturated fatty acids ethyl esters 1000 mg oral capsule: 2 cap(s) orally 2 times a day (06 Sep 2023 07:02)  timolol maleate 0.5% ophthalmic solution: 1 drop(s) to each affected eye 2 times a day (06 Sep 2023 07:02)      VITALS:   T(F): 97.7 (12-21 @ 17:47), Max: 97.7 (12-21 @ 17:47)  HR: 78 (12-21 @ 17:47) (78 - 78)  BP: 169/85 (12-21 @ 17:47) (169/85 - 169/85)  BP(mean): --  RR: 18 (12-21 @ 17:47) (18 - 18)  SpO2: 97% (12-21 @ 17:47) (97% - 97%)    I&O's Summary      REVIEW OF SYSTEMS:  CONSTITUTIONAL: No weakness, fevers or chills  HEENT: No visual changes, neck/ear pain  RESPIRATORY: No cough, sob  CARDIOVASCULAR: See HPI    PHYSICAL EXAM:  *General: Not in distress.  Non-toxic appearing.   *HEENT: EOMI  *Cardio: regular, S1, S2, no murmur  *Pulm: B/L BS.  No wheezing / crackles / rales  *Abdomen: Soft, non-tender, non-distended. Normoactive bowel sounds  *Extremities: No edema b/l le. Warm. Knee caps warm. Pulses + bilaterally. Normal capillary refill.   *Neuro: A&O x3. No focal deficits    LABS:                        17.0   8.49  )-----------( 149      ( 21 Dec 2024 18:20 )             49.2     12-21    136  |  101  |  15  ----------------------------<  149[H]  4.3   |  22  |  1.1    Ca    9.7      21 Dec 2024 18:20    TPro  7.9  /  Alb  4.5  /  TBili  0.4  /  DBili  x   /  AST  43[H]  /  ALT  40  /  AlkPhos  87  12-21              Troponin trend:    11-12        IMAGING:  - CXR:  Clear    - TTE:  EF 55-60%    - CATHETERIZATION:  2021- Non obstructive CAD      ECG:  LVH and twi in anterolateral leads (old)

## 2024-12-21 NOTE — ED ADULT TRIAGE NOTE - CHIEF COMPLAINT QUOTE
Pt presents for intermittent chest pain that started this morning. Pt states he lifted heavy object yesterday and developed the chest pain after. EKG completed in triage

## 2024-12-21 NOTE — CONSULT NOTE ADULT - ASSESSMENT
74 M hx of HTN, HLD, panic attacks, recently diagnosed atrial fibrillation,  apical HCM, non obstructive CAD cath ( 11/2021),  EF 58% (echo 5/11/23), presented To the ED today with palpitations.     Impression:  Palpitations due to frequent PACs  Twi in anterolateral leads (old) likely due to apical HCM  Afib s/p ablation in 9/2023 on eliquis  HTN/HLD    Plan:  - Patient's palpitations likely due to frequent PACs  - Would increased metoprolol to   - Continue with Eliquis 5 BID  - Trop 11 - trend  - Repeat 2D echo  - A1c, Lipid profile, TSH  - Admit to Med tele      74 M hx of HTN, HLD, panic attacks, recently diagnosed atrial fibrillation,  apical HCM, non obstructive CAD cath ( 11/2021),  EF 58% (echo 5/11/23), presented To the ED today with palpitations.     Impression:  Palpitations due to frequent PACs  Twi in anterolateral leads (old) likely due to apical HCM  Afib s/p ablation in 9/2023 on eliquis  HTN/HLD    Plan:  - Patient's palpitations likely due to frequent PACs  - Metoprolol 50 mg BID  - Continue with Eliquis 5 BID  - Trop 11 - 12, pain likely MSK  - Repeat 2D echo  - A1c, Lipid profile, TSH  - Admit to Med tele - monitor for 24 hours for arrythmias, if neg, can follow up with Dr. Fonseca as OP     74 M hx of HTN, HLD, panic attacks, recently diagnosed atrial fibrillation,  apical HCM, non obstructive CAD cath ( 11/2021),  EF 58% (echo 5/11/23), presented To the ED today with palpitations.     Impression:  Palpitations due to frequent PACs  Twi in anterolateral leads (old) likely due to apical HCM  Afib s/p ablation in 9/2023 on eliquis  HTN/HLD    Plan:  - Patient's palpitations likely due to frequent PACs  - Metoprolol 50 mg BID  - Continue with Eliquis 5 BID  - Trop 11 - 12, pain likely MSK  - Repeat 2D echo with contrast   - A1c, Lipid profile, TSH  - Admit to Med tele - monitor for 24 hours for arrythmias, if neg, can follow up with Dr. Fonseca as OP

## 2024-12-21 NOTE — CONSULT NOTE ADULT - ATTENDING COMMENTS
74 M hx of HTN, HLD, panic attacks, recently diagnosed atrial fibrillation, apical HCM, non obstructive CAD cath ( 11/2021),  EF 58% (echo 5/11/23), presented To the ED today with palpitations. Patient was discharged by the time of my evaluation and was not able to acquire a history or physical examination myself. Per the history provided by Dr. Judd Santos I agree with the plan as stated above.

## 2024-12-21 NOTE — PATIENT PROFILE ADULT - BILL PAYMENT
FREE:[LAST:[DR Hernandes],FIRST:[Chela],PHONE:[(   )    -],FAX:[(   )    -]],FREE:[LAST:[DR Lugo],FIRST:[Jason],PHONE:[(   )    -],FAX:[(   )    -],ADDRESS:[Cardiologist]] no

## 2024-12-22 VITALS
HEART RATE: 80 BPM | RESPIRATION RATE: 18 BRPM | OXYGEN SATURATION: 98 % | TEMPERATURE: 98 F | SYSTOLIC BLOOD PRESSURE: 116 MMHG | DIASTOLIC BLOOD PRESSURE: 73 MMHG

## 2024-12-22 LAB
ALBUMIN SERPL ELPH-MCNC: 4 G/DL — SIGNIFICANT CHANGE UP (ref 3.5–5.2)
ALP SERPL-CCNC: 70 U/L — SIGNIFICANT CHANGE UP (ref 30–115)
ALT FLD-CCNC: 34 U/L — SIGNIFICANT CHANGE UP (ref 0–41)
ANION GAP SERPL CALC-SCNC: 11 MMOL/L — SIGNIFICANT CHANGE UP (ref 7–14)
AST SERPL-CCNC: 30 U/L — SIGNIFICANT CHANGE UP (ref 0–41)
BASOPHILS # BLD AUTO: 0.07 K/UL — SIGNIFICANT CHANGE UP (ref 0–0.2)
BASOPHILS NFR BLD AUTO: 1 % — SIGNIFICANT CHANGE UP (ref 0–1)
BILIRUB SERPL-MCNC: 0.7 MG/DL — SIGNIFICANT CHANGE UP (ref 0.2–1.2)
BUN SERPL-MCNC: 14 MG/DL — SIGNIFICANT CHANGE UP (ref 10–20)
CALCIUM SERPL-MCNC: 9.2 MG/DL — SIGNIFICANT CHANGE UP (ref 8.4–10.5)
CHLORIDE SERPL-SCNC: 105 MMOL/L — SIGNIFICANT CHANGE UP (ref 98–110)
CHOLEST SERPL-MCNC: 222 MG/DL — HIGH
CO2 SERPL-SCNC: 22 MMOL/L — SIGNIFICANT CHANGE UP (ref 17–32)
CREAT SERPL-MCNC: 1.1 MG/DL — SIGNIFICANT CHANGE UP (ref 0.7–1.5)
EGFR: 71 ML/MIN/1.73M2 — SIGNIFICANT CHANGE UP
EOSINOPHIL # BLD AUTO: 0.22 K/UL — SIGNIFICANT CHANGE UP (ref 0–0.7)
EOSINOPHIL NFR BLD AUTO: 3.1 % — SIGNIFICANT CHANGE UP (ref 0–8)
GLUCOSE SERPL-MCNC: 98 MG/DL — SIGNIFICANT CHANGE UP (ref 70–99)
HCT VFR BLD CALC: 46.5 % — SIGNIFICANT CHANGE UP (ref 42–52)
HDLC SERPL-MCNC: 42 MG/DL — SIGNIFICANT CHANGE UP
HGB BLD-MCNC: 16 G/DL — SIGNIFICANT CHANGE UP (ref 14–18)
IMM GRANULOCYTES NFR BLD AUTO: 0.3 % — SIGNIFICANT CHANGE UP (ref 0.1–0.3)
LIPID PNL WITH DIRECT LDL SERPL: 148 MG/DL — HIGH
LYMPHOCYTES # BLD AUTO: 2.64 K/UL — SIGNIFICANT CHANGE UP (ref 1.2–3.4)
LYMPHOCYTES # BLD AUTO: 37.3 % — SIGNIFICANT CHANGE UP (ref 20.5–51.1)
MAGNESIUM SERPL-MCNC: 2.1 MG/DL — SIGNIFICANT CHANGE UP (ref 1.8–2.4)
MCHC RBC-ENTMCNC: 32.8 PG — HIGH (ref 27–31)
MCHC RBC-ENTMCNC: 34.4 G/DL — SIGNIFICANT CHANGE UP (ref 32–37)
MCV RBC AUTO: 95.3 FL — HIGH (ref 80–94)
MONOCYTES # BLD AUTO: 0.69 K/UL — HIGH (ref 0.1–0.6)
MONOCYTES NFR BLD AUTO: 9.8 % — HIGH (ref 1.7–9.3)
NEUTROPHILS # BLD AUTO: 3.43 K/UL — SIGNIFICANT CHANGE UP (ref 1.4–6.5)
NEUTROPHILS NFR BLD AUTO: 48.5 % — SIGNIFICANT CHANGE UP (ref 42.2–75.2)
NON HDL CHOLESTEROL: 180 MG/DL — HIGH
NRBC # BLD: 0 /100 WBCS — SIGNIFICANT CHANGE UP (ref 0–0)
PLATELET # BLD AUTO: 144 K/UL — SIGNIFICANT CHANGE UP (ref 130–400)
PMV BLD: 11.4 FL — HIGH (ref 7.4–10.4)
POTASSIUM SERPL-MCNC: 4.2 MMOL/L — SIGNIFICANT CHANGE UP (ref 3.5–5)
POTASSIUM SERPL-SCNC: 4.2 MMOL/L — SIGNIFICANT CHANGE UP (ref 3.5–5)
PROT SERPL-MCNC: 6.8 G/DL — SIGNIFICANT CHANGE UP (ref 6–8)
RBC # BLD: 4.88 M/UL — SIGNIFICANT CHANGE UP (ref 4.7–6.1)
RBC # FLD: 12.8 % — SIGNIFICANT CHANGE UP (ref 11.5–14.5)
SODIUM SERPL-SCNC: 138 MMOL/L — SIGNIFICANT CHANGE UP (ref 135–146)
TRIGL SERPL-MCNC: 160 MG/DL — HIGH
TROPONIN T, HIGH SENSITIVITY RESULT: 16 NG/L — SIGNIFICANT CHANGE UP (ref 6–21)
WBC # BLD: 7.07 K/UL — SIGNIFICANT CHANGE UP (ref 4.8–10.8)
WBC # FLD AUTO: 7.07 K/UL — SIGNIFICANT CHANGE UP (ref 4.8–10.8)

## 2024-12-22 PROCEDURE — 93010 ELECTROCARDIOGRAM REPORT: CPT

## 2024-12-22 PROCEDURE — 99222 1ST HOSP IP/OBS MODERATE 55: CPT

## 2024-12-22 PROCEDURE — 93306 TTE W/DOPPLER COMPLETE: CPT | Mod: 26

## 2024-12-22 RX ORDER — ALLOPURINOL 100 MG/1
100 TABLET ORAL DAILY
Refills: 0 | Status: DISCONTINUED | OUTPATIENT
Start: 2024-12-22 | End: 2024-12-22

## 2024-12-22 RX ORDER — METOPROLOL TARTRATE 50 MG
1 TABLET ORAL
Qty: 60 | Refills: 0
Start: 2024-12-22 | End: 2025-01-20

## 2024-12-22 RX ORDER — METOPROLOL TARTRATE 50 MG
50 TABLET ORAL
Refills: 0 | Status: DISCONTINUED | OUTPATIENT
Start: 2024-12-22 | End: 2024-12-22

## 2024-12-22 RX ORDER — ATORVASTATIN CALCIUM 40 MG/1
1 TABLET, FILM COATED ORAL
Qty: 30 | Refills: 0
Start: 2024-12-22 | End: 2025-01-20

## 2024-12-22 RX ORDER — APIXABAN 5 MG/1
5 TABLET, FILM COATED ORAL EVERY 12 HOURS
Refills: 0 | Status: DISCONTINUED | OUTPATIENT
Start: 2024-12-22 | End: 2024-12-22

## 2024-12-22 RX ADMIN — Medication 2 GRAM(S): at 05:26

## 2024-12-22 RX ADMIN — APIXABAN 5 MILLIGRAM(S): 5 TABLET, FILM COATED ORAL at 05:26

## 2024-12-22 RX ADMIN — Medication 50 MILLIGRAM(S): at 05:26

## 2024-12-22 NOTE — H&P ADULT - ATTENDING COMMENTS
Patient is a 73 y/o Male with  hx of HTN, HLD, panic attacks, paroxismal atrial fibrillation on Eliquis a/c tx,  apical HCM, non obstructive CAD cath ( 11/2021),  EF 58% (echo 5/11/23), presented to the ED today with intermittent palpitations.     ICU Vital Signs Last 24 Hrs  T(C): 36.4 (22 Dec 2024 07:35), Max: 36.8 (21 Dec 2024 23:40)  T(F): 97.6 (22 Dec 2024 07:35), Max: 98.2 (21 Dec 2024 23:40)  HR: 80 (22 Dec 2024 07:35) (66 - 80)  BP: 116/73 (22 Dec 2024 07:35) (116/59 - 169/85)  BP(mean): 95 (22 Dec 2024 05:17) (78 - 95)  RR: 18 (22 Dec 2024 07:35) (18 - 18)  SpO2: 98% (22 Dec 2024 07:35) (96% - 98%)    O2 Parameters below as of 22 Dec 2024 07:35  Patient On (Oxygen Delivery Method): room air    PE:   GENERAL: NAD, AAOX3, patient is laying comfortably in bed  HEENT: AT, NC, PERRLA, SUPPLE, NO JVD, NO CB  LUNG: CTA B/L  CVS: normal S1, S2, RRR, NO M/G/R  ABDOMEN: soft, bowel sounds present, normoactive in all 4 quadrants, non-tender, non-distended  EXT: no E/C/C, positive PP b/l extremities  NEURO: no acute focal neurological deficits  SKIN: no rash, no ecchymosis      A/P   # Palpitations due to frequent PACs  #Afib s/p ablation in 9/2023 on eliquis  #Apical HCM  - Patient follows with Dr Larry s/p ANTONY 2 weeks ago showing on episode of Afib for 5 minutes  - Chest x ray : no cardiopulmonary pathology   - EKG sinus with LVH and twi in anterolateral leads (old)  - Trops negative, since admissions - no further episodes of palpitations, on tele NSR in 60's with PVC's  - Cardio following, palpitations likely due to frequent PACs, rec. outpatient f/up with Dr.  Akhrass   Patient anticipated for d/c home today if cleared  by Cardiology team.   - c/w Metoprolol 50 mg BID, - c/w Eliquis 5 BID  - s/p 2d echo : < from: TTE Echo Complete w/ Contrast w/o Doppler (12.22.24 @ 07:57) >  Summary:   1. LV Ejection Fraction by Mckeon's Method with a biplane EF of 71 %.   2. Mild asymmetric left ventricular hypertrophy involving the basal and   septal walls.   3. Spectral Doppler shows impaired relaxation pattern of left   ventricular myocardial filling (Grade I diastolic dysfunction).   4. Degenerative mitral valve.   5. Moderate mitral valve regurgitation.   6. Mild tricuspid regurgitation.   7. Mild to moderate aortic regurgitation.   8. Sclerotic aortic valve with decreased opening. < end of copied text >    For chronic medical conditions resume on home regimen tx.    Code status: full code    Total time spent to complete patient's bedside assessment, review medical chart, discuss medical plan of care with covering medical team was more than 55 minutes with >50% of time spent face to face with patient, discussion with patient/family and/or coordination of care

## 2024-12-22 NOTE — DISCHARGE NOTE PROVIDER - NSDCCPCAREPLAN_GEN_ALL_CORE_FT
PRINCIPAL DISCHARGE DIAGNOSIS  Diagnosis: Chest pain  Assessment and Plan of Treatment: Palpitations  A palpitation is the feeling that your heartbeat is irregular or is faster than normal. It may feel like your heart is fluttering or skipping a beat. They may be caused by many things, including smoking, caffeine, alcohol, stress, and certain medicines. Although most causes of palpitations are not serious, palpitations can be a sign of a serious medical problem. Avoid caffeine, alcohol, and tobacco products at home. Try to reduce stress and anxiety and make sure to get plenty of rest.   SEEK IMMEDIATE MEDICAL CARE IF YOU HAVE ANY OF THE FOLLOWING SYMPTOMS: chest pain, shortness of breath, severe headache, dizziness/lightheadedness, or fainting.

## 2024-12-22 NOTE — DISCHARGE NOTE NURSING/CASE MANAGEMENT/SOCIAL WORK - PATIENT PORTAL LINK FT
You can access the FollowMyHealth Patient Portal offered by [facility] by registering at the following website: [email or web address]. By joining [facility]’s FollowMyHealth portal, you will also be able to view your health information using other applications (apps) compatible with our system.

## 2024-12-22 NOTE — DISCHARGE NOTE NURSING/CASE MANAGEMENT/SOCIAL WORK - NSDCPEFALRISK_GEN_ALL_CORE
For information on Fall & Injury Prevention, visit: [email or web address] OR  [email or web address] OR  https://www.cdc.gov/steadi/patient.html
no

## 2024-12-22 NOTE — DISCHARGE NOTE PROVIDER - HOSPITAL COURSE
73 yo M hx of HTN, HLD, panic attacks, paroxismal atrial fibrillation,  apical HCM, non obstructive CAD cath ( 11/2021),  EF 58% (echo 5/11/23), presented to the ED today with intermittent palpitations.  Patient had similar palpitations 2 weeks ago and saw his EP doctor in the office and reviewed the recent MCOT which showed only 1 episode of A-fib.  Patient denies any shortness of breath nausea vomiting diaphoresis.    ED vitals:  · BP Hzarurae798 mm Hg  · BP Mdmgmitae45 mm Hg  · Heart Rate78 /min  · Respiration Rate (breaths/min)18 /min  · Temp (F)97.7 Degrees F  · Temp (C)36.5 Degrees C  · SpO2 (%)97 %    Chest xray clear  EKG sinus with LVH and twi in anterolateral leads (old)    Patient admitted to telemetry for monitoring on telemetry    # Palpitations due to frequent PACs  #Afib s/p ablation in 9/2023 on eliquis  #Apical HCM  - Patient with intermittent palpitations  - Patient follows with Dr Larry s/p MCOT 2 weeks ago showing on episode of Afib for 5 minutes  - Currently denies chest pain/ palpitations  - Chest xray clear  - EKG sinus with LVH and twi in anterolateral leads (old)  - Trops negative  - Cardio following, palpitations likely due to frequent PACs  - c/w Metoprolol 50 mg BID  - c/w Eliquis 5 BID  - TTE- 1. LV EF of 71 %. 2. Mild asymmetric left ventricular hypertrophy involving the basal and septal walls. 3. Spectral Doppler shows impaired relaxation pattern of left ventricular myocardial filling (Grade I diastolic dysfunction).    - Check A1c, Lipid profile, TSH  - Admit to Med tele - monitor for 24 hours for arrythmia, if neg, can follow up with Dr. Fonseca as OP    #Gout  - No acute flair  - c/w allopurinol  Discussion of discharge plan of care, including discharge diagnoses, medication reconciliation, and follow-ups was discussed with Dr. Paez on 12/22/2024 and discharge was approved.   73 yo M hx of HTN, HLD, panic attacks, paroxismal atrial fibrillation,  apical HCM, non obstructive CAD cath ( 11/2021),  EF 58% (echo 5/11/23), presented to the ED today with intermittent palpitations.  Patient had similar palpitations 2 weeks ago and saw his EP doctor in the office and reviewed the recent MCOT which showed only 1 episode of A-fib.  Patient denies any shortness of breath nausea vomiting diaphoresis.    ED vitals:  · BP Raduliwm336 mm Hg  · BP Msvnodryb17 mm Hg  · Heart Rate78 /min  · Respiration Rate (breaths/min)18 /min  · Temp (F)97.7 Degrees F  · Temp (C)36.5 Degrees C  · SpO2 (%)97 %    Chest xray clear  EKG sinus with LVH and twi in anterolateral leads (old)    Patient admitted to telemetry for monitoring on telemetry    # Palpitations due to frequent PACs  #Afib s/p ablation in 9/2023 on eliquis  #Apical HCM  - Patient with intermittent palpitations  - Patient follows with Dr Larry s/p MCOT 2 weeks ago showing on episode of Afib for 5 minutes  - Currently denies chest pain/ palpitations  - Chest xray clear  - EKG sinus with LVH and twi in anterolateral leads (old)  - Trops negative  - Cardio following, palpitations likely due to frequent PACs  - c/w Metoprolol 50 mg BID  - c/w Eliquis 5 BID  - TTE- 1. LV EF of 71 %. 2. Mild asymmetric left ventricular hypertrophy involving the basal and septal walls. 3. Spectral Doppler shows impaired relaxation pattern of left ventricular myocardial filling (Grade I diastolic dysfunction).    - Patient was monitored on telemetry - no significant  arrythmia noted, patient was cleared for d/c and instructed to  follow up with Dr. Fonseca as OP    #Gout  - No acute flair  - c/w allopurinol  Discussion of discharge plan of care, including discharge diagnoses, medication reconciliation, and follow-ups was discussed with Dr. Paez on 12/22/2024 and discharge was approved.

## 2024-12-22 NOTE — DISCHARGE NOTE PROVIDER - CARE PROVIDER_API CALL
Edward Fonseca  Cardiac Electrophysiology  [address] [place] [address], [address]  [place] [address]  Phone: [phone]  Fax: [phone]  Established Patient  Follow Up Time: 2 weeks

## 2024-12-22 NOTE — H&P ADULT - ASSESSMENT
73 yo M hx of HTN, HLD, panic attacks, paroxismal atrial fibrillation,  apical HCM, non obstructive CAD cath ( 11/2021),  EF 58% (echo 5/11/23), presented to the ED today with intermittent palpitations.     # Palpitations due to frequent PACs  #Afib s/p ablation in 9/2023 on eliquis  - Patient with intermittent palpitations, follows with Dr Larry s/p MCOT 2 weeks ago  - Chest xray clear  - EKG sinus with LVH and twi in anterolateral leads (old)  - Trops negative  - Cardio following, palpitations likely due to frequent PACs  - c/w Metoprolol 50 mg BID  - c/w Eliquis 5 BID  - Check 2D echo  - Check A1c, Lipid profile, TSH  - Admit to Med tele - monitor for 24 hours for arrythmias, if neg, can follow up with Dr. Fonseca as OP    DVT ppx: Eliquis   Activity: IAT  Diet: DASH   73 yo M hx of HTN, HLD, panic attacks, paroxismal atrial fibrillation,  apical HCM, non obstructive CAD cath ( 11/2021),  EF 58% (echo 5/11/23), presented to the ED today with intermittent palpitations.     # Palpitations due to frequent PACs  #Afib s/p ablation in 9/2023 on eliquis  #Apical HCM  - Patient with intermittent palpitations  - Patient follows with Dr Larry s/p RANDIOT 2 weeks ago showing on episode of Afib for 5 minutes  - Chest xray clear  - EKG sinus with LVH and twi in anterolateral leads (old)  - Trops negative  - Cardio following, palpitations likely due to frequent PACs  - c/w Metoprolol 50 mg BID  - c/w Eliquis 5 BID  - Check 2D echo  - Check A1c, Lipid profile, TSH  - Admit to Med tele - monitor for 24 hours for arrythmias, if neg, can follow up with Dr. Fonseca as OP    #Gout  - No acute flair  - c/w allopurinol    DVT ppx: Eliquis   Activity: IAT  Diet: DASH   73 yo M hx of HTN, HLD, panic attacks, paroxismal atrial fibrillation,  apical HCM, non obstructive CAD cath ( 11/2021),  EF 58% (echo 5/11/23), presented to the ED today with intermittent palpitations.     # Palpitations due to frequent PACs  #Afib s/p ablation in 9/2023 on eliquis  #Apical HCM  - Patient with intermittent palpitations  - Patient follows with Dr Larry s/p ANTONY 2 weeks ago showing on episode of Afib for 5 minutes  - Currently denies chest pain/ palpitations  - Chest xray clear  - EKG sinus with LVH and twi in anterolateral leads (old)  - Trops negative  - Cardio following, palpitations likely due to frequent PACs  - c/w Metoprolol 50 mg BID  - c/w Eliquis 5 BID  - Check 2D echo  - Check A1c, Lipid profile, TSH  - Admit to Med tele - monitor for 24 hours for arrythmias, if neg, can follow up with Dr. Fonseca as OP    #Gout  - No acute flair  - c/w allopurinol    DVT ppx: Eliquis   Activity: IAT  Diet: DASH

## 2024-12-22 NOTE — DISCHARGE NOTE PROVIDER - NSDCFUSCHEDAPPT_GEN_ALL_CORE_FT
Edward Fonseca  [facility] PreAdmits  Scheduled Appointment: 01/07/2025    [place]  [facility] Physician [facility]  CARDIOLOGY [address]  Scheduled Appointment: 01/27/2025    Terrell Heard  [facility]  CARDIOLOGY [address]  Scheduled Appointment: 01/28/2025    [facility]  ELECTROPH [address]  Scheduled Appointment: 02/03/2025

## 2024-12-22 NOTE — DISCHARGE NOTE PROVIDER - NSDCMRMEDTOKEN_GEN_ALL_CORE_FT
allopurinol 100 mg oral tablet: 1 tab(s) orally once a day  Eliquis 5 mg oral tablet: 1 tab(s) orally every 12 hours  Lopressor 50 mg oral tablet: 1 tab(s) orally 2 times a day  omega-3 polyunsaturated fatty acids ethyl esters 1000 mg oral capsule: 2 cap(s) orally 2 times a day   allopurinol 100 mg oral tablet: 1 tab(s) orally once a day  atorvastatin 40 mg oral tablet: 1 tab(s) orally once a day  Eliquis 5 mg oral tablet: 1 tab(s) orally every 12 hours  Lopressor 50 mg oral tablet: 1 tab(s) orally 2 times a day  omega-3 polyunsaturated fatty acids ethyl esters 1000 mg oral capsule: 2 cap(s) orally 2 times a day

## 2024-12-22 NOTE — H&P ADULT - NSHPLABSRESULTS_GEN_ALL_CORE
.  LABS:                         17.0   8.49  )-----------( 149      ( 21 Dec 2024 18:20 )             49.2     12-21    136  |  101  |  15  ----------------------------<  149[H]  4.3   |  22  |  1.1    Ca    9.7      21 Dec 2024 18:20    TPro  7.9  /  Alb  4.5  /  TBili  0.4  /  DBili  x   /  AST  43[H]  /  ALT  40  /  AlkPhos  87  12-21      Urinalysis Basic - ( 21 Dec 2024 18:20 )    Color: x / Appearance: x / SG: x / pH: x  Gluc: 149 mg/dL / Ketone: x  / Bili: x / Urobili: x   Blood: x / Protein: x / Nitrite: x   Leuk Esterase: x / RBC: x / WBC x   Sq Epi: x / Non Sq Epi: x / Bacteria: x            RADIOLOGY, EKG & ADDITIONAL TESTS: Reviewed.

## 2024-12-22 NOTE — DISCHARGE NOTE NURSING/CASE MANAGEMENT/SOCIAL WORK - FINANCIAL ASSISTANCE
[facility] provides services at a reduced cost to those who are determined to be eligible through [facility]’s financial assistance program. Information regarding [facility]’s financial assistance program can be found by going to [email or web address] or by calling [phone].

## 2024-12-22 NOTE — H&P ADULT - HISTORY OF PRESENT ILLNESS
73 yo M hx of HTN, HLD, panic attacks, paroxismal atrial fibrillation,  apical HCM, non obstructive CAD cath ( 11/2021),  EF 58% (echo 5/11/23), presented to the ED today with intermittent palpitations.  Patient had similar palpitations 2 weeks ago and saw his EP doctor in the office and reviewed the recent MCOT which showed only 1 episode of A-fib.  Patient denies any shortness of breath nausea vomiting diaphoresis.    ED vitals:  · BP Qizshoaf725 mm Hg  · BP Rxumdeydq59 mm Hg  · Heart Rate78 /min  · Respiration Rate (breaths/min)18 /min  · Temp (F)97.7 Degrees F  · Temp (C)36.5 Degrees C  · SpO2 (%)97 %    Chest xray clear  EKG sinus with LVH and twi in anterolateral leads (old)    Patient admitted to telmetry for further management. 75 yo M hx of HTN, HLD, panic attacks, paroxismal atrial fibrillation,  apical HCM, non obstructive CAD cath ( 11/2021),  EF 58% (echo 5/11/23), presented to the ED today with intermittent palpitations.  Patient had similar palpitations 2 weeks ago and saw his EP doctor in the office and reviewed the recent MCOT which showed only 1 episode of A-fib.  Patient denies any shortness of breath nausea vomiting diaphoresis.    ED vitals:  · BP Qkymhopl192 mm Hg  · BP Ksjoopxrk90 mm Hg  · Heart Rate78 /min  · Respiration Rate (breaths/min)18 /min  · Temp (F)97.7 Degrees F  · Temp (C)36.5 Degrees C  · SpO2 (%)97 %    Chest xray clear  EKG sinus with LVH and twi in anterolateral leads (old)    Patient admitted to telemetry for further management.

## 2024-12-23 LAB
A1C WITH ESTIMATED AVERAGE GLUCOSE RESULT: 6.2 % — HIGH (ref 4–5.6)
ESTIMATED AVERAGE GLUCOSE: 131 MG/DL — HIGH (ref 68–114)
TSH SERPL-MCNC: 0.9 UIU/ML — SIGNIFICANT CHANGE UP (ref 0.27–4.2)
TSH SERPL-MCNC: 0.9 UIU/ML — SIGNIFICANT CHANGE UP (ref 0.27–4.2)

## 2024-12-31 DIAGNOSIS — I10 ESSENTIAL (PRIMARY) HYPERTENSION: ICD-10-CM

## 2024-12-31 DIAGNOSIS — I49.1 ATRIAL PREMATURE DEPOLARIZATION: ICD-10-CM

## 2024-12-31 DIAGNOSIS — R07.9 CHEST PAIN, UNSPECIFIED: ICD-10-CM

## 2024-12-31 DIAGNOSIS — F41.0 PANIC DISORDER [EPISODIC PAROXYSMAL ANXIETY]: ICD-10-CM

## 2024-12-31 DIAGNOSIS — Z79.01 LONG TERM (CURRENT) USE OF ANTICOAGULANTS: ICD-10-CM

## 2024-12-31 DIAGNOSIS — E78.5 HYPERLIPIDEMIA, UNSPECIFIED: ICD-10-CM

## 2024-12-31 DIAGNOSIS — I08.3 COMBINED RHEUMATIC DISORDERS OF MITRAL, AORTIC AND TRICUSPID VALVES: ICD-10-CM

## 2024-12-31 DIAGNOSIS — I48.0 PAROXYSMAL ATRIAL FIBRILLATION: ICD-10-CM

## 2024-12-31 DIAGNOSIS — I25.10 ATHEROSCLEROTIC HEART DISEASE OF NATIVE CORONARY ARTERY WITHOUT ANGINA PECTORIS: ICD-10-CM

## 2024-12-31 DIAGNOSIS — I42.2 OTHER HYPERTROPHIC CARDIOMYOPATHY: ICD-10-CM

## 2024-12-31 DIAGNOSIS — M10.9 GOUT, UNSPECIFIED: ICD-10-CM

## 2025-01-07 ENCOUNTER — OUTPATIENT (OUTPATIENT)
Dept: OUTPATIENT SERVICES | Facility: HOSPITAL | Age: 75
LOS: 1 days | Discharge: HOME | End: 2025-01-07
Payer: COMMERCIAL

## 2025-01-07 DIAGNOSIS — I48.19 OTHER PERSISTENT ATRIAL FIBRILLATION: ICD-10-CM

## 2025-01-07 DIAGNOSIS — Z98.890 OTHER SPECIFIED POSTPROCEDURAL STATES: Chronic | ICD-10-CM

## 2025-01-07 PROCEDURE — C1764: CPT

## 2025-01-07 PROCEDURE — 33285 INSJ SUBQ CAR RHYTHM MNTR: CPT

## 2025-01-07 RX ADMIN — Medication 500 MILLIGRAM(S): at 08:06

## 2025-01-07 NOTE — H&P ADULT - NSHPPHYSICALEXAM_GEN_ALL_CORE
GENERAL:  73y/o Male NAD, resting comfortably.  HEAD:  Atraumatic, Normocephalic  EYES: EOMI, PERRLA, conjunctiva and sclera clear  NECK: Supple, No JVD, no cervical lymphadenopathy, non-tender  CHEST/LUNG: Clear to auscultation bilaterally; No wheeze, rhonchi, or rales  HEART: Regular rate and rhythm; S1&S2  ABDOMEN: Soft, Nontender, Nondistended x 4 quadrants; Bowel sounds present  EXTREMITIES:   Peripheral Pulses Present, No clubbing, no cyanosis, or no edema, no calf tenderness  PSYCH: AAOx3, cooperative, appropriate  NEUROLOGY: WNL  SKIN: WNL

## 2025-01-07 NOTE — H&P ADULT - HISTORY OF PRESENT ILLNESS
Hypertension, hyperlipidemia, gout, apical HCM, long-standing palpitations attributed to panic attacks, recently   diagnosed atrial fibrillation.   Patient presented to [facility] on May 11, 2023 for atrial fibrillation with rapid ventricular response. He had severe   palpitations similar to prior palpitations since 2019 attributed to panic attacks. He underwent cryoablation 9/2023 on Eliquis and Metoprolol

## 2025-01-07 NOTE — H&P ADULT - ASSESSMENT
Hypertension, hyperlipidemia, gout, apical HCM, long-standing palpitations attributed to panic attacks, recently   diagnosed atrial fibrillation.   Patient presented to [facility] on May 11, 2023 for atrial fibrillation with rapid ventricular response. He had severe   palpitations similar to prior palpitations since 2019 attributed to panic attacks. He underwent cryoablation 9/2023 on Eliquis and Metoprolol  Pt here for ILR Implantation

## 2025-01-07 NOTE — CHART NOTE - NSCHARTNOTEFT_GEN_A_CORE
Electrophysiology Brief Post-Op Note    I have personally seen and examined the patient.  I agree with the history and physical which I have reviewed and noted any changes below.  01-07-25 @ 15:22    PRE-OP DIAGNOSIS: Palpitations / AF/ Syncope / Cryptogenic CVA    POST-OP DIAGNOSIS: Palpitations / AF/ Syncope / Cryptogenic CVA    PROCEDURE: Loop Implant    Physician: Dr. Fonseca  Assistant: CRISELDA Guillen    ESTIMATED BLOOD LOSS:  2  mL    ANESTHESIA TYPE:  [  ]General Anesthesia  [  ] Sedation  [X  ] Local/Regional    CONDITION  [  ] Critical  [  ] Serious  [  ]Fair  [ X ]Good    SPECIMENS REMOVED (IF APPLICABLE):  none    IMPLANTS (IF APPLICABLE)  Loop Recorder (Medtronic)[id number]  R wave amplitude: 0.39mV    FINDINGS  PLAN OF CARE  - F/U 3-4 weeks  - May remove bandaid in 2 days  - May shower in 48 hours

## 2025-01-13 DIAGNOSIS — I48.91 UNSPECIFIED ATRIAL FIBRILLATION: ICD-10-CM

## 2025-01-27 ENCOUNTER — APPOINTMENT (OUTPATIENT)
Dept: CARDIOLOGY | Facility: CLINIC | Age: 75
End: 2025-01-27
Payer: COMMERCIAL

## 2025-01-27 VITALS
WEIGHT: 207 LBS | SYSTOLIC BLOOD PRESSURE: 140 MMHG | HEART RATE: 76 BPM | DIASTOLIC BLOOD PRESSURE: 80 MMHG | HEIGHT: 69 IN | BODY MASS INDEX: 30.66 KG/M2

## 2025-01-27 DIAGNOSIS — R94.31 ABNORMAL ELECTROCARDIOGRAM [ECG] [EKG]: ICD-10-CM

## 2025-01-27 PROCEDURE — 93000 ELECTROCARDIOGRAM COMPLETE: CPT

## 2025-01-27 PROCEDURE — G2211 COMPLEX E/M VISIT ADD ON: CPT | Mod: NC

## 2025-01-27 PROCEDURE — 99215 OFFICE O/P EST HI 40 MIN: CPT

## 2025-01-28 ENCOUNTER — APPOINTMENT (OUTPATIENT)
Dept: CARDIOLOGY | Facility: CLINIC | Age: 75
End: 2025-01-28
Payer: COMMERCIAL

## 2025-01-28 ENCOUNTER — NON-APPOINTMENT (OUTPATIENT)
Age: 75
End: 2025-01-28

## 2025-01-28 VITALS
HEIGHT: 69 IN | WEIGHT: 212 LBS | SYSTOLIC BLOOD PRESSURE: 128 MMHG | DIASTOLIC BLOOD PRESSURE: 78 MMHG | OXYGEN SATURATION: 96 % | BODY MASS INDEX: 31.4 KG/M2 | HEART RATE: 77 BPM

## 2025-01-28 DIAGNOSIS — E78.5 HYPERLIPIDEMIA, UNSPECIFIED: ICD-10-CM

## 2025-01-28 DIAGNOSIS — I51.7 CARDIOMEGALY: ICD-10-CM

## 2025-01-28 DIAGNOSIS — I42.2 OTHER HYPERTROPHIC CARDIOMYOPATHY: ICD-10-CM

## 2025-01-28 DIAGNOSIS — R73.03 PREDIABETES.: ICD-10-CM

## 2025-01-28 DIAGNOSIS — G47.33 OBSTRUCTIVE SLEEP APNEA (ADULT) (PEDIATRIC): ICD-10-CM

## 2025-01-28 DIAGNOSIS — I25.10 ATHEROSCLEROTIC HEART DISEASE OF NATIVE CORONARY ARTERY W/OUT ANGINA PECTORIS: ICD-10-CM

## 2025-01-28 DIAGNOSIS — R74.8 ABNORMAL LEVELS OF OTHER SERUM ENZYMES: ICD-10-CM

## 2025-01-28 PROCEDURE — G2211 COMPLEX E/M VISIT ADD ON: CPT | Mod: NC

## 2025-01-28 PROCEDURE — 99214 OFFICE O/P EST MOD 30 MIN: CPT

## 2025-02-03 ENCOUNTER — APPOINTMENT (OUTPATIENT)
Dept: ELECTROPHYSIOLOGY | Facility: CLINIC | Age: 75
End: 2025-02-03
Payer: COMMERCIAL

## 2025-02-03 ENCOUNTER — NON-APPOINTMENT (OUTPATIENT)
Age: 75
End: 2025-02-03

## 2025-02-03 VITALS
SYSTOLIC BLOOD PRESSURE: 125 MMHG | DIASTOLIC BLOOD PRESSURE: 65 MMHG | HEIGHT: 69 IN | WEIGHT: 206 LBS | HEART RATE: 73 BPM | BODY MASS INDEX: 30.51 KG/M2

## 2025-02-03 DIAGNOSIS — I48.0 PAROXYSMAL ATRIAL FIBRILLATION: ICD-10-CM

## 2025-02-03 DIAGNOSIS — Z45.09 ENCOUNTER FOR ADJUSTMENT AND MANAGEMENT OF OTHER CARDIAC DEVICE: ICD-10-CM

## 2025-02-03 DIAGNOSIS — I10 ESSENTIAL (PRIMARY) HYPERTENSION: ICD-10-CM

## 2025-02-03 DIAGNOSIS — Z51.89 ENCOUNTER FOR OTHER SPECIFIED AFTERCARE: ICD-10-CM

## 2025-02-03 PROCEDURE — 93285 PRGRMG DEV EVAL SCRMS IP: CPT

## 2025-02-03 PROCEDURE — 99214 OFFICE O/P EST MOD 30 MIN: CPT | Mod: 25

## 2025-02-03 RX ORDER — COLCHICINE 0.6 MG/1
0.6 CAPSULE ORAL
Qty: 30 | Refills: 0 | Status: ACTIVE | COMMUNITY
Start: 2025-01-13

## 2025-03-10 ENCOUNTER — APPOINTMENT (OUTPATIENT)
Dept: CARDIOLOGY | Facility: CLINIC | Age: 75
End: 2025-03-10
Payer: COMMERCIAL

## 2025-03-10 ENCOUNTER — NON-APPOINTMENT (OUTPATIENT)
Age: 75
End: 2025-03-10

## 2025-03-10 PROCEDURE — 93298 REM INTERROG DEV EVAL SCRMS: CPT

## 2025-04-14 ENCOUNTER — NON-APPOINTMENT (OUTPATIENT)
Age: 75
End: 2025-04-14

## 2025-04-14 ENCOUNTER — APPOINTMENT (OUTPATIENT)
Dept: CARDIOLOGY | Facility: CLINIC | Age: 75
End: 2025-04-14
Payer: COMMERCIAL

## 2025-04-14 PROCEDURE — 93298 REM INTERROG DEV EVAL SCRMS: CPT

## 2025-04-22 ENCOUNTER — RX RENEWAL (OUTPATIENT)
Age: 75
End: 2025-04-22

## 2025-05-15 ENCOUNTER — NON-APPOINTMENT (OUTPATIENT)
Age: 75
End: 2025-05-15

## 2025-05-15 ENCOUNTER — APPOINTMENT (OUTPATIENT)
Dept: CARDIOLOGY | Facility: CLINIC | Age: 75
End: 2025-05-15
Payer: COMMERCIAL

## 2025-05-15 PROCEDURE — 93298 REM INTERROG DEV EVAL SCRMS: CPT

## 2025-05-27 ENCOUNTER — APPOINTMENT (OUTPATIENT)
Dept: PULMONOLOGY | Facility: CLINIC | Age: 75
End: 2025-05-27
Payer: COMMERCIAL

## 2025-05-27 VITALS
BODY MASS INDEX: 28.8 KG/M2 | OXYGEN SATURATION: 98 % | HEART RATE: 79 BPM | DIASTOLIC BLOOD PRESSURE: 76 MMHG | WEIGHT: 195 LBS | SYSTOLIC BLOOD PRESSURE: 124 MMHG

## 2025-05-27 DIAGNOSIS — R91.1 SOLITARY PULMONARY NODULE: ICD-10-CM

## 2025-05-27 DIAGNOSIS — E66.811 OBESITY, CLASS 1: ICD-10-CM

## 2025-05-27 DIAGNOSIS — G47.33 OBSTRUCTIVE SLEEP APNEA (ADULT) (PEDIATRIC): ICD-10-CM

## 2025-05-27 PROCEDURE — G2211 COMPLEX E/M VISIT ADD ON: CPT | Mod: NC

## 2025-05-27 PROCEDURE — 99213 OFFICE O/P EST LOW 20 MIN: CPT

## 2025-06-19 ENCOUNTER — APPOINTMENT (OUTPATIENT)
Dept: CARDIOLOGY | Facility: CLINIC | Age: 75
End: 2025-06-19
Payer: COMMERCIAL

## 2025-06-19 ENCOUNTER — NON-APPOINTMENT (OUTPATIENT)
Age: 75
End: 2025-06-19

## 2025-06-19 PROCEDURE — 93298 REM INTERROG DEV EVAL SCRMS: CPT

## 2025-07-22 ENCOUNTER — APPOINTMENT (OUTPATIENT)
Dept: CARDIOLOGY | Facility: CLINIC | Age: 75
End: 2025-07-22

## 2025-07-24 ENCOUNTER — APPOINTMENT (OUTPATIENT)
Dept: CARDIOLOGY | Facility: CLINIC | Age: 75
End: 2025-07-24
Payer: COMMERCIAL

## 2025-07-24 ENCOUNTER — NON-APPOINTMENT (OUTPATIENT)
Age: 75
End: 2025-07-24

## 2025-07-24 PROCEDURE — 93298 REM INTERROG DEV EVAL SCRMS: CPT

## 2025-07-29 NOTE — ED PROVIDER NOTE - ADDITIONAL EKG
Behavioral Health Services  Interdisciplinary Discharge Instructions    Transportation:     Residence Upon Discharge: HOME          Sharps / Valuables Returned: Yes    Patients's own medication returned: N/A    Discharge Instructions:  Return to the Emergency Department or contact Mobile Crisis for increased depression, suicidal ideation, or altered thoughts.              [facility] Mobile Crisis #[phone]  Call 24/7 for any safety concerns      National Crisis Line Information:   Call or text 211 any time you need assistance with human, social and basi needs, mental health or substance abuse resources, support, and for help during disaster situations.    Call 988 for access to trained crisis counselors when experiencing thoughts of suicide, a mental malvin or substance use crisis or emotional distress to prevent a situation from becoming dangerous.     Call 911 when in danger and to request IMMEDIATE assistance    COPE Line: [phone]-VTVD ([phone])     Warmline: 396.462.6306     Call Impact 211 for resources:   Dial [phone] if '211' does not work on cell phone.   Text: [phone]     Depression Hotline - (957) 803-7069     Suicide Prevention Lifeline: 1-893.618.5957    Apps & Online Resources:   Yaqfrxv4Hiudb, Moving Forward, MY3, A Friend Asks, Virtual Hope Box, Booster Juan (teens, young adults), Calm, Headspace, Talkspace  www.[employer].Defywire  www.inspire.com/groups/mental-health-elijah/  www.shelly.org/Support-Education    Calm Harm is an kvng designed specifically to help you manage self-harm urges. Website: https://calmharm.co.uk/  Mindshift CBT is an kvng designed to help manage anxiety, including panic attacks. Website: https://www.anxietycanada.com/resources/mindshift-cbt/  Breathr and Insight Timer are mindfulness apps. Mindful breathing can be a way to calm your emotions down when they feel out of control. Website: https://[facility].ca/breathr    Myparrishomy Patient Video Education  Platform    Click the link or scan the QR code to access helpful educational videos after discharge to promote continued wellness.    Using a Computer or Smart Phone     Using a Smart Phone  Click the link         Scan the QR Code    [email or web address]                                                                                                                     Follow up with your PCP for blood pressure monitoring and renal function evaluation   Additional EKG Note...

## 2025-08-05 ENCOUNTER — APPOINTMENT (OUTPATIENT)
Dept: PULMONOLOGY | Facility: CLINIC | Age: 75
End: 2025-08-05

## 2025-08-27 ENCOUNTER — NON-APPOINTMENT (OUTPATIENT)
Age: 75
End: 2025-08-27

## 2025-08-27 ENCOUNTER — APPOINTMENT (OUTPATIENT)
Dept: CARDIOLOGY | Facility: CLINIC | Age: 75
End: 2025-08-27

## 2025-08-27 PROCEDURE — 93298 REM INTERROG DEV EVAL SCRMS: CPT
